# Patient Record
Sex: FEMALE | Race: WHITE | NOT HISPANIC OR LATINO | Employment: OTHER | URBAN - METROPOLITAN AREA
[De-identification: names, ages, dates, MRNs, and addresses within clinical notes are randomized per-mention and may not be internally consistent; named-entity substitution may affect disease eponyms.]

---

## 2018-05-21 ENCOUNTER — TRANSFERRED RECORDS (OUTPATIENT)
Dept: HEALTH INFORMATION MANAGEMENT | Facility: CLINIC | Age: 78
End: 2018-05-21

## 2018-05-30 ENCOUNTER — ANESTHESIA EVENT (OUTPATIENT)
Dept: SURGERY | Facility: CLINIC | Age: 78
DRG: 460 | End: 2018-05-30
Payer: MEDICARE

## 2018-05-30 ENCOUNTER — ANESTHESIA (OUTPATIENT)
Dept: SURGERY | Facility: CLINIC | Age: 78
DRG: 460 | End: 2018-05-30
Payer: MEDICARE

## 2018-05-30 ENCOUNTER — APPOINTMENT (OUTPATIENT)
Dept: GENERAL RADIOLOGY | Facility: CLINIC | Age: 78
DRG: 460 | End: 2018-05-30
Attending: ORTHOPAEDIC SURGERY
Payer: MEDICARE

## 2018-05-30 ENCOUNTER — HOSPITAL ENCOUNTER (INPATIENT)
Facility: CLINIC | Age: 78
LOS: 4 days | Discharge: HOME | DRG: 460 | End: 2018-06-03
Attending: ORTHOPAEDIC SURGERY | Admitting: ORTHOPAEDIC SURGERY
Payer: MEDICARE

## 2018-05-30 DIAGNOSIS — B96.20 E. COLI UTI: ICD-10-CM

## 2018-05-30 DIAGNOSIS — N39.0 E. COLI UTI: ICD-10-CM

## 2018-05-30 DIAGNOSIS — Z98.1 STATUS POST ARTHRODESIS: Primary | ICD-10-CM

## 2018-05-30 PROBLEM — M48.00 SPINAL STENOSIS: Status: ACTIVE | Noted: 2018-05-30

## 2018-05-30 LAB
ABO + RH BLD: NORMAL
ABO + RH BLD: NORMAL
BLD GP AB SCN SERPL QL: NORMAL
BLOOD BANK CMNT PATIENT-IMP: NORMAL
GLUCOSE BLDC GLUCOMTR-MCNC: 134 MG/DL (ref 70–99)
GLUCOSE BLDC GLUCOMTR-MCNC: 134 MG/DL (ref 70–99)
POTASSIUM SERPL-SCNC: 4.3 MMOL/L (ref 3.4–5.3)
SPECIMEN EXP DATE BLD: NORMAL

## 2018-05-30 PROCEDURE — 25000300 ZZH OR RX SURGIFLO HEMOSTATIC MATRIX 10ML 199102S OPNP: Performed by: ORTHOPAEDIC SURGERY

## 2018-05-30 PROCEDURE — 84132 ASSAY OF SERUM POTASSIUM: CPT | Performed by: ANESTHESIOLOGY

## 2018-05-30 PROCEDURE — 86901 BLOOD TYPING SEROLOGIC RH(D): CPT | Performed by: ANESTHESIOLOGY

## 2018-05-30 PROCEDURE — A9270 NON-COVERED ITEM OR SERVICE: HCPCS | Mod: GY | Performed by: PHYSICIAN ASSISTANT

## 2018-05-30 PROCEDURE — 07DR3ZZ EXTRACTION OF ILIAC BONE MARROW, PERCUTANEOUS APPROACH: ICD-10-PCS | Performed by: ORTHOPAEDIC SURGERY

## 2018-05-30 PROCEDURE — 00000146 ZZHCL STATISTIC GLUCOSE BY METER IP

## 2018-05-30 PROCEDURE — 86900 BLOOD TYPING SEROLOGIC ABO: CPT | Performed by: ANESTHESIOLOGY

## 2018-05-30 PROCEDURE — 37000008 ZZH ANESTHESIA TECHNICAL FEE, 1ST 30 MIN: Performed by: ORTHOPAEDIC SURGERY

## 2018-05-30 PROCEDURE — 25000125 ZZHC RX 250: Performed by: PHYSICIAN ASSISTANT

## 2018-05-30 PROCEDURE — 36000071 ZZH SURGERY LEVEL 5 W FLUORO 1ST 30 MIN: Performed by: ORTHOPAEDIC SURGERY

## 2018-05-30 PROCEDURE — 27210794 ZZH OR GENERAL SUPPLY STERILE: Performed by: ORTHOPAEDIC SURGERY

## 2018-05-30 PROCEDURE — 25000128 H RX IP 250 OP 636: Performed by: PHYSICIAN ASSISTANT

## 2018-05-30 PROCEDURE — 12000007 ZZH R&B INTERMEDIATE

## 2018-05-30 PROCEDURE — 27210995 ZZH RX 272: Performed by: ORTHOPAEDIC SURGERY

## 2018-05-30 PROCEDURE — 25000125 ZZHC RX 250: Performed by: ORTHOPAEDIC SURGERY

## 2018-05-30 PROCEDURE — 25000128 H RX IP 250 OP 636: Performed by: NURSE ANESTHETIST, CERTIFIED REGISTERED

## 2018-05-30 PROCEDURE — 25000125 ZZHC RX 250: Performed by: ANESTHESIOLOGY

## 2018-05-30 PROCEDURE — 25000566 ZZH SEVOFLURANE, EA 15 MIN: Performed by: ORTHOPAEDIC SURGERY

## 2018-05-30 PROCEDURE — 25000128 H RX IP 250 OP 636: Performed by: ANESTHESIOLOGY

## 2018-05-30 PROCEDURE — 25000125 ZZHC RX 250: Performed by: NURSE ANESTHETIST, CERTIFIED REGISTERED

## 2018-05-30 PROCEDURE — 0SG0071 FUSION OF LUMBAR VERTEBRAL JOINT WITH AUTOLOGOUS TISSUE SUBSTITUTE, POSTERIOR APPROACH, POSTERIOR COLUMN, OPEN APPROACH: ICD-10-PCS | Performed by: ORTHOPAEDIC SURGERY

## 2018-05-30 PROCEDURE — L0642 LO SAG RI AN/POS PNL PRE OTS: HCPCS

## 2018-05-30 PROCEDURE — 25000132 ZZH RX MED GY IP 250 OP 250 PS 637: Mod: GY | Performed by: PHYSICIAN ASSISTANT

## 2018-05-30 PROCEDURE — 40000277 XR SURGERY CARM FLUORO LESS THAN 5 MIN W STILLS

## 2018-05-30 PROCEDURE — 36000069 ZZH SURGERY LEVEL 5 EA 15 ADDTL MIN: Performed by: ORTHOPAEDIC SURGERY

## 2018-05-30 PROCEDURE — 71000012 ZZH RECOVERY PHASE 1 LEVEL 1 FIRST HR: Performed by: ORTHOPAEDIC SURGERY

## 2018-05-30 PROCEDURE — 37000009 ZZH ANESTHESIA TECHNICAL FEE, EACH ADDTL 15 MIN: Performed by: ORTHOPAEDIC SURGERY

## 2018-05-30 PROCEDURE — 25000128 H RX IP 250 OP 636: Performed by: ORTHOPAEDIC SURGERY

## 2018-05-30 PROCEDURE — C1713 ANCHOR/SCREW BN/BN,TIS/BN: HCPCS | Performed by: ORTHOPAEDIC SURGERY

## 2018-05-30 PROCEDURE — 40000170 ZZH STATISTIC PRE-PROCEDURE ASSESSMENT II: Performed by: ORTHOPAEDIC SURGERY

## 2018-05-30 PROCEDURE — 86850 RBC ANTIBODY SCREEN: CPT | Performed by: ANESTHESIOLOGY

## 2018-05-30 PROCEDURE — 36415 COLL VENOUS BLD VENIPUNCTURE: CPT | Performed by: ANESTHESIOLOGY

## 2018-05-30 PROCEDURE — 25000132 ZZH RX MED GY IP 250 OP 250 PS 637: Mod: GY | Performed by: ORTHOPAEDIC SURGERY

## 2018-05-30 DEVICE — IMP SCR MEDT TSRH 3DX OG THIN 6.5X45MM TI 83565459: Type: IMPLANTABLE DEVICE | Status: FUNCTIONAL

## 2018-05-30 DEVICE — IMP SCR SET MEDT TSRH 3D DBL HEX 8281246: Type: IMPLANTABLE DEVICE | Site: SPINE LUMBAR | Status: FUNCTIONAL

## 2018-05-30 DEVICE — IMP CONNECTOR MEDT TSRH 3D SM 8379120: Type: IMPLANTABLE DEVICE | Site: SPINE LUMBAR | Status: FUNCTIONAL

## 2018-05-30 DEVICE — IMP ROD MEDT TSRH PREBENT 03.5CM 8370035: Type: IMPLANTABLE DEVICE | Site: SPINE LUMBAR | Status: FUNCTIONAL

## 2018-05-30 RX ORDER — BISACODYL 5 MG
10 TABLET, DELAYED RELEASE (ENTERIC COATED) ORAL DAILY PRN
Status: DISCONTINUED | OUTPATIENT
Start: 2018-05-30 | End: 2018-06-03 | Stop reason: HOSPADM

## 2018-05-30 RX ORDER — DIPHENHYDRAMINE HYDROCHLORIDE 50 MG/ML
12.5 INJECTION INTRAMUSCULAR; INTRAVENOUS EVERY 6 HOURS PRN
Status: DISCONTINUED | OUTPATIENT
Start: 2018-05-30 | End: 2018-06-03 | Stop reason: HOSPADM

## 2018-05-30 RX ORDER — ONDANSETRON 4 MG/1
4 TABLET, ORALLY DISINTEGRATING ORAL EVERY 6 HOURS PRN
Status: DISCONTINUED | OUTPATIENT
Start: 2018-05-30 | End: 2018-06-03 | Stop reason: HOSPADM

## 2018-05-30 RX ORDER — FENTANYL CITRATE 50 UG/ML
25-50 INJECTION, SOLUTION INTRAMUSCULAR; INTRAVENOUS
Status: DISCONTINUED | OUTPATIENT
Start: 2018-05-30 | End: 2018-05-30 | Stop reason: HOSPADM

## 2018-05-30 RX ORDER — ONDANSETRON 2 MG/ML
INJECTION INTRAMUSCULAR; INTRAVENOUS PRN
Status: DISCONTINUED | OUTPATIENT
Start: 2018-05-30 | End: 2018-05-30

## 2018-05-30 RX ORDER — VECURONIUM BROMIDE 1 MG/ML
INJECTION, POWDER, LYOPHILIZED, FOR SOLUTION INTRAVENOUS PRN
Status: DISCONTINUED | OUTPATIENT
Start: 2018-05-30 | End: 2018-05-30

## 2018-05-30 RX ORDER — LOSARTAN POTASSIUM 25 MG/1
25 TABLET ORAL 2 TIMES DAILY
Status: DISCONTINUED | OUTPATIENT
Start: 2018-05-30 | End: 2018-06-03 | Stop reason: HOSPADM

## 2018-05-30 RX ORDER — CEFAZOLIN SODIUM 2 G/100ML
2 INJECTION, SOLUTION INTRAVENOUS
Status: DISCONTINUED | OUTPATIENT
Start: 2018-05-30 | End: 2018-05-30 | Stop reason: HOSPADM

## 2018-05-30 RX ORDER — LIDOCAINE HYDROCHLORIDE 20 MG/ML
INJECTION, SOLUTION INFILTRATION; PERINEURAL PRN
Status: DISCONTINUED | OUTPATIENT
Start: 2018-05-30 | End: 2018-05-30

## 2018-05-30 RX ORDER — HYDROMORPHONE HYDROCHLORIDE 1 MG/ML
.3-.5 INJECTION, SOLUTION INTRAMUSCULAR; INTRAVENOUS; SUBCUTANEOUS
Status: DISCONTINUED | OUTPATIENT
Start: 2018-05-30 | End: 2018-06-03 | Stop reason: HOSPADM

## 2018-05-30 RX ORDER — FENTANYL CITRATE 50 UG/ML
INJECTION, SOLUTION INTRAMUSCULAR; INTRAVENOUS PRN
Status: DISCONTINUED | OUTPATIENT
Start: 2018-05-30 | End: 2018-05-30

## 2018-05-30 RX ORDER — DIPHENHYDRAMINE HCL 12.5MG/5ML
12.5 LIQUID (ML) ORAL EVERY 6 HOURS PRN
Status: DISCONTINUED | OUTPATIENT
Start: 2018-05-30 | End: 2018-06-03 | Stop reason: HOSPADM

## 2018-05-30 RX ORDER — SPIRONOLACTONE 25 MG
25 TABLET ORAL DAILY
Status: DISCONTINUED | OUTPATIENT
Start: 2018-05-30 | End: 2018-06-03 | Stop reason: HOSPADM

## 2018-05-30 RX ORDER — ACETAMINOPHEN 325 MG/1
975 TABLET ORAL EVERY 8 HOURS
Status: COMPLETED | OUTPATIENT
Start: 2018-05-30 | End: 2018-06-02

## 2018-05-30 RX ORDER — POLYETHYLENE GLYCOL 3350 17 G/17G
17 POWDER, FOR SOLUTION ORAL DAILY PRN
Status: DISCONTINUED | OUTPATIENT
Start: 2018-05-30 | End: 2018-06-03 | Stop reason: HOSPADM

## 2018-05-30 RX ORDER — AMOXICILLIN 250 MG
1 CAPSULE ORAL 2 TIMES DAILY
Status: DISCONTINUED | OUTPATIENT
Start: 2018-05-30 | End: 2018-06-03 | Stop reason: HOSPADM

## 2018-05-30 RX ORDER — CLINDAMYCIN PHOSPHATE 900 MG/50ML
900 INJECTION, SOLUTION INTRAVENOUS EVERY 8 HOURS
Status: COMPLETED | OUTPATIENT
Start: 2018-05-30 | End: 2018-05-31

## 2018-05-30 RX ORDER — GLYCOPYRROLATE 0.2 MG/ML
INJECTION, SOLUTION INTRAMUSCULAR; INTRAVENOUS PRN
Status: DISCONTINUED | OUTPATIENT
Start: 2018-05-30 | End: 2018-05-30

## 2018-05-30 RX ORDER — CLINDAMYCIN PHOSPHATE 900 MG/50ML
INJECTION, SOLUTION INTRAVENOUS PRN
Status: DISCONTINUED | OUTPATIENT
Start: 2018-05-30 | End: 2018-05-30

## 2018-05-30 RX ORDER — AMOXICILLIN 250 MG
2 CAPSULE ORAL 2 TIMES DAILY
Status: DISCONTINUED | OUTPATIENT
Start: 2018-05-30 | End: 2018-06-03 | Stop reason: HOSPADM

## 2018-05-30 RX ORDER — NEOSTIGMINE METHYLSULFATE 1 MG/ML
VIAL (ML) INJECTION PRN
Status: DISCONTINUED | OUTPATIENT
Start: 2018-05-30 | End: 2018-05-30

## 2018-05-30 RX ORDER — ONDANSETRON 2 MG/ML
4 INJECTION INTRAMUSCULAR; INTRAVENOUS EVERY 30 MIN PRN
Status: DISCONTINUED | OUTPATIENT
Start: 2018-05-30 | End: 2018-05-30 | Stop reason: HOSPADM

## 2018-05-30 RX ORDER — SODIUM CHLORIDE, SODIUM LACTATE, POTASSIUM CHLORIDE, CALCIUM CHLORIDE 600; 310; 30; 20 MG/100ML; MG/100ML; MG/100ML; MG/100ML
INJECTION, SOLUTION INTRAVENOUS CONTINUOUS
Status: DISCONTINUED | OUTPATIENT
Start: 2018-05-30 | End: 2018-05-30 | Stop reason: HOSPADM

## 2018-05-30 RX ORDER — HYDROMORPHONE HYDROCHLORIDE 1 MG/ML
.3-.5 INJECTION, SOLUTION INTRAMUSCULAR; INTRAVENOUS; SUBCUTANEOUS EVERY 5 MIN PRN
Status: DISCONTINUED | OUTPATIENT
Start: 2018-05-30 | End: 2018-05-30 | Stop reason: HOSPADM

## 2018-05-30 RX ORDER — NALOXONE HYDROCHLORIDE 0.4 MG/ML
.1-.4 INJECTION, SOLUTION INTRAMUSCULAR; INTRAVENOUS; SUBCUTANEOUS
Status: DISCONTINUED | OUTPATIENT
Start: 2018-05-30 | End: 2018-05-30

## 2018-05-30 RX ORDER — SODIUM CHLORIDE 9 MG/ML
INJECTION, SOLUTION INTRAVENOUS CONTINUOUS
Status: DISCONTINUED | OUTPATIENT
Start: 2018-05-30 | End: 2018-05-31

## 2018-05-30 RX ORDER — CEFAZOLIN SODIUM 1 G/3ML
1 INJECTION, POWDER, FOR SOLUTION INTRAMUSCULAR; INTRAVENOUS SEE ADMIN INSTRUCTIONS
Status: DISCONTINUED | OUTPATIENT
Start: 2018-05-30 | End: 2018-05-30 | Stop reason: HOSPADM

## 2018-05-30 RX ORDER — METOPROLOL SUCCINATE 25 MG/1
25 TABLET, EXTENDED RELEASE ORAL DAILY
Status: DISCONTINUED | OUTPATIENT
Start: 2018-05-31 | End: 2018-06-03 | Stop reason: HOSPADM

## 2018-05-30 RX ORDER — MAGNESIUM CARB/ALUMINUM HYDROX 105-160MG
148 TABLET,CHEWABLE ORAL
Status: DISCONTINUED | OUTPATIENT
Start: 2018-05-30 | End: 2018-06-03 | Stop reason: HOSPADM

## 2018-05-30 RX ORDER — OXYCODONE HYDROCHLORIDE 5 MG/1
5-10 TABLET ORAL EVERY 4 HOURS PRN
Status: DISCONTINUED | OUTPATIENT
Start: 2018-05-30 | End: 2018-06-03 | Stop reason: HOSPADM

## 2018-05-30 RX ORDER — LIDOCAINE 40 MG/G
CREAM TOPICAL
Status: DISCONTINUED | OUTPATIENT
Start: 2018-05-30 | End: 2018-06-03 | Stop reason: HOSPADM

## 2018-05-30 RX ORDER — ONDANSETRON 2 MG/ML
4 INJECTION INTRAMUSCULAR; INTRAVENOUS EVERY 6 HOURS PRN
Status: DISCONTINUED | OUTPATIENT
Start: 2018-05-30 | End: 2018-06-03 | Stop reason: HOSPADM

## 2018-05-30 RX ORDER — BISACODYL 5 MG
5 TABLET, DELAYED RELEASE (ENTERIC COATED) ORAL DAILY PRN
Status: DISCONTINUED | OUTPATIENT
Start: 2018-05-30 | End: 2018-06-03 | Stop reason: HOSPADM

## 2018-05-30 RX ORDER — PROPOFOL 10 MG/ML
INJECTION, EMULSION INTRAVENOUS PRN
Status: DISCONTINUED | OUTPATIENT
Start: 2018-05-30 | End: 2018-05-30

## 2018-05-30 RX ORDER — ONDANSETRON 4 MG/1
4 TABLET, ORALLY DISINTEGRATING ORAL EVERY 30 MIN PRN
Status: DISCONTINUED | OUTPATIENT
Start: 2018-05-30 | End: 2018-05-30 | Stop reason: HOSPADM

## 2018-05-30 RX ORDER — NALOXONE HYDROCHLORIDE 0.4 MG/ML
.1-.4 INJECTION, SOLUTION INTRAMUSCULAR; INTRAVENOUS; SUBCUTANEOUS
Status: DISCONTINUED | OUTPATIENT
Start: 2018-05-30 | End: 2018-06-03 | Stop reason: HOSPADM

## 2018-05-30 RX ORDER — BISACODYL 5 MG
15 TABLET, DELAYED RELEASE (ENTERIC COATED) ORAL DAILY PRN
Status: DISCONTINUED | OUTPATIENT
Start: 2018-05-30 | End: 2018-06-03 | Stop reason: HOSPADM

## 2018-05-30 RX ORDER — DEXAMETHASONE SODIUM PHOSPHATE 4 MG/ML
INJECTION, SOLUTION INTRA-ARTICULAR; INTRALESIONAL; INTRAMUSCULAR; INTRAVENOUS; SOFT TISSUE PRN
Status: DISCONTINUED | OUTPATIENT
Start: 2018-05-30 | End: 2018-05-30

## 2018-05-30 RX ORDER — ACETAMINOPHEN 325 MG/1
650 TABLET ORAL EVERY 4 HOURS PRN
Status: DISCONTINUED | OUTPATIENT
Start: 2018-06-02 | End: 2018-06-03 | Stop reason: HOSPADM

## 2018-05-30 RX ADMIN — LIDOCAINE HYDROCHLORIDE 80 MG: 20 INJECTION, SOLUTION INFILTRATION; PERINEURAL at 09:27

## 2018-05-30 RX ADMIN — ACETAMINOPHEN 975 MG: 325 TABLET, FILM COATED ORAL at 23:15

## 2018-05-30 RX ADMIN — DEXMEDETOMIDINE HYDROCHLORIDE 8 MCG: 100 INJECTION, SOLUTION INTRAVENOUS at 09:23

## 2018-05-30 RX ADMIN — SODIUM CHLORIDE: 9 INJECTION, SOLUTION INTRAVENOUS at 16:28

## 2018-05-30 RX ADMIN — DEXAMETHASONE SODIUM PHOSPHATE 4 MG: 4 INJECTION, SOLUTION INTRA-ARTICULAR; INTRALESIONAL; INTRAMUSCULAR; INTRAVENOUS; SOFT TISSUE at 09:52

## 2018-05-30 RX ADMIN — LOSARTAN POTASSIUM 25 MG: 25 TABLET, FILM COATED ORAL at 20:53

## 2018-05-30 RX ADMIN — ROCURONIUM BROMIDE 40 MG: 10 INJECTION INTRAVENOUS at 09:28

## 2018-05-30 RX ADMIN — GLYCOPYRROLATE 0.6 MG: 0.2 INJECTION, SOLUTION INTRAMUSCULAR; INTRAVENOUS at 13:44

## 2018-05-30 RX ADMIN — VECURONIUM BROMIDE 2 MG: 1 INJECTION, POWDER, LYOPHILIZED, FOR SOLUTION INTRAVENOUS at 10:58

## 2018-05-30 RX ADMIN — ACETAMINOPHEN 975 MG: 325 TABLET, FILM COATED ORAL at 17:16

## 2018-05-30 RX ADMIN — HYDROMORPHONE HYDROCHLORIDE 0.5 MG: 1 INJECTION, SOLUTION INTRAMUSCULAR; INTRAVENOUS; SUBCUTANEOUS at 10:48

## 2018-05-30 RX ADMIN — PHENYLEPHRINE HYDROCHLORIDE 100 MCG: 10 INJECTION, SOLUTION INTRAMUSCULAR; INTRAVENOUS; SUBCUTANEOUS at 09:52

## 2018-05-30 RX ADMIN — HYDROMORPHONE HYDROCHLORIDE: 10 INJECTION, SOLUTION INTRAMUSCULAR; INTRAVENOUS; SUBCUTANEOUS at 14:31

## 2018-05-30 RX ADMIN — HYDROMORPHONE HYDROCHLORIDE 0.5 MG: 1 INJECTION, SOLUTION INTRAMUSCULAR; INTRAVENOUS; SUBCUTANEOUS at 11:57

## 2018-05-30 RX ADMIN — LIDOCAINE HYDROCHLORIDE 5 ML: 10 INJECTION, SOLUTION EPIDURAL; INFILTRATION; INTRACAUDAL; PERINEURAL at 08:47

## 2018-05-30 RX ADMIN — SENNOSIDES AND DOCUSATE SODIUM 1 TABLET: 8.6; 5 TABLET ORAL at 20:54

## 2018-05-30 RX ADMIN — CLINDAMYCIN PHOSPHATE 900 MG: 18 INJECTION, SOLUTION INTRAVENOUS at 18:31

## 2018-05-30 RX ADMIN — Medication 25 MG: at 17:16

## 2018-05-30 RX ADMIN — FENTANYL CITRATE 50 MCG: 50 INJECTION, SOLUTION INTRAMUSCULAR; INTRAVENOUS at 11:29

## 2018-05-30 RX ADMIN — FENTANYL CITRATE 50 MCG: 50 INJECTION, SOLUTION INTRAMUSCULAR; INTRAVENOUS at 11:31

## 2018-05-30 RX ADMIN — PROPOFOL 20 MG: 10 INJECTION, EMULSION INTRAVENOUS at 11:26

## 2018-05-30 RX ADMIN — VECURONIUM BROMIDE 2 MG: 1 INJECTION, POWDER, LYOPHILIZED, FOR SOLUTION INTRAVENOUS at 10:05

## 2018-05-30 RX ADMIN — PROPOFOL 20 MG: 10 INJECTION, EMULSION INTRAVENOUS at 09:45

## 2018-05-30 RX ADMIN — Medication 1 EACH: at 09:19

## 2018-05-30 RX ADMIN — PROPOFOL 150 MG: 10 INJECTION, EMULSION INTRAVENOUS at 09:27

## 2018-05-30 RX ADMIN — ONDANSETRON 4 MG: 2 INJECTION INTRAMUSCULAR; INTRAVENOUS at 13:34

## 2018-05-30 RX ADMIN — SODIUM CHLORIDE, POTASSIUM CHLORIDE, SODIUM LACTATE AND CALCIUM CHLORIDE: 600; 310; 30; 20 INJECTION, SOLUTION INTRAVENOUS at 10:38

## 2018-05-30 RX ADMIN — PHENYLEPHRINE HYDROCHLORIDE 100 MCG: 10 INJECTION, SOLUTION INTRAMUSCULAR; INTRAVENOUS; SUBCUTANEOUS at 10:41

## 2018-05-30 RX ADMIN — PROPOFOL 30 MG: 10 INJECTION, EMULSION INTRAVENOUS at 11:28

## 2018-05-30 RX ADMIN — Medication 1 LOZENGE: at 17:16

## 2018-05-30 RX ADMIN — FENTANYL CITRATE 50 MCG: 50 INJECTION, SOLUTION INTRAMUSCULAR; INTRAVENOUS at 09:27

## 2018-05-30 RX ADMIN — DEXMEDETOMIDINE HYDROCHLORIDE 8 MCG: 100 INJECTION, SOLUTION INTRAVENOUS at 09:19

## 2018-05-30 RX ADMIN — SODIUM CHLORIDE, POTASSIUM CHLORIDE, SODIUM LACTATE AND CALCIUM CHLORIDE: 600; 310; 30; 20 INJECTION, SOLUTION INTRAVENOUS at 08:46

## 2018-05-30 RX ADMIN — VECURONIUM BROMIDE 2 MG: 1 INJECTION, POWDER, LYOPHILIZED, FOR SOLUTION INTRAVENOUS at 11:25

## 2018-05-30 RX ADMIN — FENTANYL CITRATE 50 MCG: 50 INJECTION, SOLUTION INTRAMUSCULAR; INTRAVENOUS at 09:23

## 2018-05-30 RX ADMIN — CLINDAMYCIN PHOSPHATE 900 MG: 18 INJECTION, SOLUTION INTRAVENOUS at 09:41

## 2018-05-30 RX ADMIN — DEXMEDETOMIDINE HYDROCHLORIDE 0.3 MCG/KG/HR: 100 INJECTION, SOLUTION INTRAVENOUS at 09:40

## 2018-05-30 RX ADMIN — FENTANYL CITRATE 50 MCG: 50 INJECTION, SOLUTION INTRAMUSCULAR; INTRAVENOUS at 09:59

## 2018-05-30 RX ADMIN — VECURONIUM BROMIDE 2 MG: 1 INJECTION, POWDER, LYOPHILIZED, FOR SOLUTION INTRAVENOUS at 12:56

## 2018-05-30 RX ADMIN — SODIUM CHLORIDE, POTASSIUM CHLORIDE, SODIUM LACTATE AND CALCIUM CHLORIDE: 600; 310; 30; 20 INJECTION, SOLUTION INTRAVENOUS at 13:49

## 2018-05-30 RX ADMIN — NEOSTIGMINE METHYLSULFATE 4 MG: 1 INJECTION, SOLUTION INTRAVENOUS at 13:44

## 2018-05-30 ASSESSMENT — ACTIVITIES OF DAILY LIVING (ADL)
ADLS_ACUITY_SCORE: 9
ADLS_ACUITY_SCORE: 9

## 2018-05-30 ASSESSMENT — LIFESTYLE VARIABLES: TOBACCO_USE: 0

## 2018-05-30 NOTE — PROGRESS NOTES
Admission medication history interview status for the 5/30/2018  admission is complete. See EPIC admission navigator for prior to admission medications     Medication history source reliability:Good    Medication history interview source(s):Patient    Medication history resources (including written lists, pill bottles, clinic record):None    Primary pharmacy.[facility]    Additional medication history information not noted on PTA med list :None    Time spent in this activity: 45 minutes    Prior to Admission medications    Medication Sig Last Dose Taking? Auth Provider   Alpha-Lipoic Acid (LIPOIC ACID PO) Take 300 mg by mouth 2 times daily 5/24/2018 Yes Reported, Patient   Ascorbic Acid (VITAMIN C PO) Take 1,000 mg by mouth 3 times daily  5/24/2018 Yes Reported, Patient   B Complex-C (SUPER B COMPLEX PO) Take 1 tablet by mouth daily  5/24/2018 Yes Reported, Patient   Barberry-Oreg Grape-Goldenseal (BERBERINE COMPLEX PO) Take 500 mg by mouth 2 times daily  5/24/2018 Yes Reported, Patient   Cetirizine HCl (KLS ALLER-ISIDRA PO) Take 10 mg by mouth daily 5/29/2018 at am Yes Reported, Patient   Coenzyme Q10 (COQ-10 PO) Take 100 mg by mouth daily  5/24/2018 Yes Reported, Patient   FOLIC ACID PO Take 10 drops by mouth daily Super Liquid Folate 5/24/2018 Yes Reported, Patient   GLUCOSAMINE-CHONDROITIN PO Take 1 tablet by mouth daily  5/24/2018 Yes Reported, Patient   KRILL OIL PO Take 500 mg by mouth daily more than a week Yes Reported, Patient   LOSARTAN POTASSIUM PO Take 25 mg by mouth 2 times daily 5/30/2018 at 0500 Yes Reported, Patient   MAGNESIUM PO Take 250 mg by mouth daily  5/24/2018 Yes Reported, Patient   MELATONIN PO Take 10 tablets by mouth daily  5/29/2018 at pm Yes Reported, Patient   METOPROLOL SUCCINATE ER PO Take 25 mg by mouth daily (Takes 0.5 x 50mg tablet = 25mg dose) 5/30/2018 at 0500 Yes Reported, Patient   minoxidil (ROGAINE) 2 % external solution Apply topically 2 times daily 5/24/2018 Yes Reported,  Patient   Multiple Vitamins-Minerals (HAIR SKIN NAILS PO) Take 2 tablets by mouth daily 5/24/2018 Yes Reported, Patient   OMEPRAZOLE PO Take 20 mg by mouth daily 5/29/2018 at am Yes Reported, Patient   Probiotic Product (PROBIOTIC PO) Take 2 tablets by mouth daily  5/24/2018 Yes Reported, Patient   SPIRONOLACTONE PO Take 25 mg by mouth daily 5/24/2018 Yes Reported, Patient   TRAMADOL HCL PO Take 50 mg by mouth 3 times daily as needed  more than a week at prn Yes Reported, Patient   TURMERIC PO Take 500 mg by mouth 2 times daily  5/24/2018 Yes Reported, Patient   VITAMIN D-VITAMIN K PO Take 15 drops by mouth daily 5/24/2018 Yes Reported, Patient   VITAMIN E PO Take 400 Units by mouth daily  5/24/2018 Yes Reported, Patient

## 2018-05-30 NOTE — IP AVS SNAPSHOT
MRN:[id number]                      After Visit Summary   5/30/2018    Lisa Johnston    MRN: [id number]           Thank you!     Thank you for choosing [facility] for your care. Our goal is always to provide you with excellent care. Hearing back from our patients is one way we can continue to improve our services. Please take a few minutes to complete the written survey that you may receive in the mail after you visit with us. Thank you!        Patient Information     Date Of Birth          1940        Designated Caregiver       Most Recent Value    Caregiver    Will someone help with your care after discharge? yes    Name of designated caregiver bon    Phone number of caregiver     Caregiver address [place]      About your hospital stay     You were admitted on:  May 30, 2018 You last received care in the:  [facility] [address] [facility]    You were discharged on:  Yasmin 3, 2018        Reason for your hospital stay       UTI  Degenerative spondylolisthesis L4-5 with severe spinal stenosis and bilateral leg radiculopathy- s/p L4-5 decompression bilateral, posterior spinal fusion with bone graft.                  Who to Call     For medical emergencies, please call 911.  For non-urgent questions about your medical care, please call your primary care provider or clinic, [phone]  For questions related to your surgery, please call your surgery clinic        Attending Provider     Provider Specialty    Price Prajapati MD Orthopedics       Primary Care Provider Office Phone # Fax #    Wilfred Medina [phone]      After Care Instructions     Diet       Follow this diet upon discharge: Orders Placed This Encounter      Regular Diet Adult                  Follow-up Appointments     Follow-up and recommended labs and tests        Follow up with primary care provider, Wilfred Medina, within 7 days for hospital follow- up.  No follow up labs or test are  needed.                  Further instructions from your care team                Care after a Spinal Fusion - Dr. Price Prajapati      The following information will help you through your recovery at home.    Pain    It is normal for you to experience pain after your surgery, most patients feel their pre-op pain has improved and they are left with a fair amount of surgical pain. This pain will improve a great deal over the 5-7 days you are in the hospital.      It is normal to have some ongoing pain when you get home from the hospital.  The pain should slowly improve over the next several weeks to months.     You should call Dr. Prajapati s office if you have a sudden onset of pain that does not improve over 24 hours.     Activity    You may increase your activity as tolerated, walking is the best form of exercise after spine surgery.      Avoid bending, lifting and twisting at the waist (BLT).  Avoid activities such as vacuuming, raking and shoveling.     You should wear your lumbar support when you are up out of bed. You should wear the brace for 6 weeks after your surgery.    Try to limit your lifting to 10lbs until you see Dr. Prajapati at your post-op appointment.    You should anticipate being out of work approximately 4-6 weeks after your surgery, some patients are able to return sooner and others need more time.  Dr. Prajapati and his staff will help you determine a return to work plan.      You may resume sexual activity 4-6 weeks after surgery.  Stop if you have pain.    Driving    You may drive if you feel strong enough and are not taking any narcotic pain medications.    Incision Site    When you leave the hospital keep your back incision covered for 10 days from your surgery date with daily dressing changes. If you have a stomach incision it can be left uncovered.    You may shower without covering your incision once you are home from the hospital, allow water and soap to run over your incision but do not scrub the  area or soak in a tub.      Your incision is covered with steri-strips (narrow white tapes); they will get loose and fall off in 7-10 days.  If they do not fall off after 10 days you may remove them or Dr. Alo villalobos staff will remove for you at your post-op visit.    Most patients have dissolvable stitches which do not need to be removed.  In some cases staples or nylon stitches are used and they need to be removed, 10-14 days after surgery. If you have staples or nylon sutures please call for a removal appointment with Dr. Alo villalobos nurse.    Pain Management     Take your prescribed pain medication as needed and directed.  Use Tylenol for your discomfort when you no longer need the narcotic pain medication.      DO NOT take Ibuprofen, Aleve, Motrin, Advil or any other anti-inflammatory medication.  They may affect the bone growth of your fusion.    If you need a refill on your pain medication call [phone], please allow 24 hour for your prescription to be refilled, Dr. Alo villalobos office does not refill pain medications on Friday.    Follow-up Visits     You should have a post-op appointment that was scheduled for you at the same time your surgery was scheduled. If you do not please call Dr. Alo villalobos office as soon as you get home.  Your appointment will be approximately one month after your surgery.    Write down any questions you have about your surgery, recovery, return to work and other topics you wished to be covered at your post-op visit.  This way, we will be able to address all of your questions at your next visit.    Call your doctor if you have any questions or concerns.    When to Call your Doctor    If you have any redness, warmth or swelling at the incision site.    If your incision opens up.    If you have increasing drainage from your incision.    If you have a temperature of more than 100.5 degrees Fahrenheit.    [facility] [address] [address]  Phone: [phone]  Fax: [phone]  Web  "site: [email or web address]        PUT ICE ON BOTH LOWER ARM IV SITES EVERY 4-6 HOURS, MONITOR FOR INCREASED REDNESS, SWELLING, OR PAIN.  CALL PRIMARY MD IF ANY ISSUES ARISE           Pending Results     Date and Time Order Name Status Description    2018 1100 Urine Culture Aerobic Bacterial Preliminary             Statement of Approval     Ordered          18 0715  I have reviewed and agree with all the recommendations and orders detailed in this document.  EFFECTIVE NOW     Approved and electronically signed by:  Price Prajapati MD             Admission Information     Date & Time Provider Department Dept. Phone    2018 Price Prajapati MD [place] [address] [facility] [phone]      Your Vitals Were     Blood Pressure Temperature Respirations Height Weight Pulse Oximetry    154/71 (BP Location: Right arm) 98.3  F (36.8  C) (Oral) 16 1.6 m (5' 3\") 90.3 kg (199 lb) 93%    BMI (Body Mass Index)                   35.25 kg/m2           MyChart Information     [facility] lets you send messages to your doctor, view your test results, renew your prescriptions, schedule appointments and more. To sign up, go to [email or web address] . Click on \"Log in\" on the left side of the screen, which will take you to the Welcome page. Then click on \"Sign up Now\" on the right side of the page.     You will be asked to enter the access code listed below, as well as some personal information. Please follow the directions to create your username and password.     Your access code is: [email or web address]  Expires: 2018  9:38 AM     Your access code will  in 90 days. If you need help or a new code, please call your [facility] clinic or [phone].        Care EveryWhere ID     This is your Care EveryWhere ID. This could be used by other organizations to access your [facility] medical records  [id number]        Equal Access to Services     LILLY SORIA AH: Opal Saldivar, jose de jesus mccann, roberto " chaz burgoslakshmi hannahaapromise ah. [facility] [phone].    ATENCIÓN: Si christine trinidad, tiene a dennis disposición servicios gratuitos de asistencia lingüística. Mel al [phone].    We comply with applicable federal civil rights laws and [place] laws. We do not discriminate on the basis of race, color, national origin, age, disability, sex, sexual orientation, or gender identity.               Review of your medicines      START taking        Dose / Directions    ciprofloxacin 500 MG tablet   Commonly known as:  CIPRO   Indication:  Urinary Tract Infection   Used for:  E. coli UTI        Dose:  500 mg   Take 1 tablet (500 mg) by mouth 2 times daily for 6 days   Quantity:  11 tablet   Refills:  0         CONTINUE these medicines which may have CHANGED, or have new prescriptions. If we are uncertain of the size of tablets/capsules you have at home, strength may be listed as something that might have changed.        Dose / Directions    * TRAMADOL HCL PO   This may have changed:  Another medication with the same name was added. Make sure you understand how and when to take each.   Notes to Patient:  Take only every 6 hours as needed for pain, not three times in addition to every 6 hours for pain        Dose:  50 mg   Take 50 mg by mouth 3 times daily as needed   Refills:  0       * traMADol 50 MG tablet   Commonly known as:  ULTRAM   This may have changed:  You were already taking a medication with the same name, and this prescription was added. Make sure you understand how and when to take each.   Used for:  Status post arthrodesis   Notes to Patient:  You had Oxycodone 10mg at 10:45, do not take anymore pain medication until at least 3:00 PM        Dose:  50 mg   Take 1 tablet (50 mg) by mouth every 6 hours as needed for moderate pain   Quantity:  20 tablet   Refills:  0       * Notice:  This list has 2 medication(s) that are the same as other medications prescribed for you. Read the  directions carefully, and ask your doctor or other care provider to review them with you.      CONTINUE these medicines which have NOT CHANGED        Dose / Directions    BERBERINE COMPLEX PO        Dose:  500 mg   Take 500 mg by mouth 2 times daily   Refills:  0       COQ-10 PO        Dose:  100 mg   Take 100 mg by mouth daily   Refills:  0       FOLIC ACID PO        Dose:  10 drop   Take 10 drops by mouth daily Super Liquid Folate   Refills:  0       GLUCOSAMINE-CHONDROITIN PO        Dose:  1 tablet   Take 1 tablet by mouth daily   Refills:  0       HAIR SKIN NAILS PO        Dose:  2 tablet   Take 2 tablets by mouth daily   Refills:  0       KLS ALLER-ISIDRA PO        Dose:  10 mg   Take 10 mg by mouth daily   Refills:  0       KRILL OIL PO        Dose:  500 mg   Take 500 mg by mouth daily   Refills:  0       LIPOIC ACID PO        Dose:  300 mg   Take 300 mg by mouth 2 times daily   Refills:  0       LOSARTAN POTASSIUM PO        Dose:  25 mg   Take 25 mg by mouth 2 times daily   Refills:  0       MAGNESIUM PO        Dose:  250 mg   Take 250 mg by mouth daily   Refills:  0       MELATONIN PO        Dose:  10 tablet   Take 10 tablets by mouth daily   Refills:  0       METOPROLOL SUCCINATE ER PO        Dose:  25 mg   Take 25 mg by mouth daily (Takes 0.5 x 50mg tablet = 25mg dose)   Refills:  0       minoxidil 2 % external solution   Commonly known as:  ROGAINE        Apply topically 2 times daily   Refills:  0       OMEPRAZOLE PO        Dose:  20 mg   Take 20 mg by mouth daily   Refills:  0       PROBIOTIC PO        Dose:  2 tablet   Take 2 tablets by mouth daily   Refills:  0       SPIRONOLACTONE PO        Dose:  25 mg   Take 25 mg by mouth daily   Refills:  0       SUPER B COMPLEX PO        Dose:  1 tablet   Take 1 tablet by mouth daily   Refills:  0       TURMERIC PO        Dose:  500 mg   Take 500 mg by mouth 2 times daily   Refills:  0       VITAMIN C PO        Dose:  1000 mg   Take 1,000 mg by mouth 3 times daily    Refills:  0       VITAMIN D-VITAMIN K PO        Dose:  15 drop   Take 15 drops by mouth daily   Refills:  0       VITAMIN E PO        Dose:  400 Units   Take 400 Units by mouth daily   Refills:  0            Where to get your medicines      These medications were sent to [id number] - [place], [place] - [address]  [address] [address]     Phone:  [phone]     ciprofloxacin 500 MG tablet         Some of these will need a paper prescription and others can be bought over the counter. Ask your nurse if you have questions.     Bring a paper prescription for each of these medications     traMADol 50 MG tablet                Protect others around you: Learn how to safely use, store and throw away your medicines at www.disposemymeds.org.        ANTIBIOTIC INSTRUCTION     You've Been Prescribed an Antibiotic - Now What?  Your healthcare team thinks that you or your loved one might have an infection. Some infections can be treated with antibiotics, which are powerful, life-saving drugs. Like all medications, antibiotics have side effects and should only be used when necessary. There are some important things you should know about your antibiotic treatment.      Your healthcare team may run tests before you start taking an antibiotic.    Your team may take samples (e.g., from your blood, urine or other areas) to run tests to look for bacteria. These test can be important to determine if you need an antibiotic at all and, if you do, which antibiotic will work best.      Within a few days, your healthcare team might change or even stop your antibiotic.    Your team may start you on an antibiotic while they are working to find out what is making you sick.    Your team might change your antibiotic because test results show that a different antibiotic would be better to treat your infection.    In some cases, once your team has more information, they learn that you do not need an  antibiotic at all. They may find out that you don't have an infection, or that the antibiotic you're taking won't work against your infection. For example, an infection caused by a virus can't be treated with antibiotics. Staying on an antibiotic when you don't need it is more likely to be harmful than helpful.      You may experience side effects from your antibiotic.    Like all medications, antibiotics have side effects. Some of these can be serious.    Let you healthcare team know if you have any known allergies when you are admitted to the hospital.    One significant side effect of nearly all antibiotics is the risk of severe and sometimes deadly diarrhea caused by Clostridium difficile (C. Difficile). This occurs when a person takes antibiotics because some good germs are destroyed. Antibiotic use allows C. diificile to take over, putting patients at high risk for this serious infection.    As a patient or caregiver, it is important to understand your or your loved one's antibiotic treatment. It is especially important for caregivers to speak up when patients can't speak for themselves. Here are some important questions to ask your healthcare team.    What infection is this antibiotic treating and how do you know I have that infection?    What side effects might occur from this antibiotic?    How long will I need to take this antibiotic?    Is it safe to take this antibiotic with other medications or supplements (e.g., vitamins) that I am taking?     Are there any special directions I need to know about taking this antibiotic? For example, should I take it with food?    How will I be monitored to know whether my infection is responding to the antibiotic?    What tests may help to make sure the right antibiotic is prescribed for me?      Information provided by:  www.cdc.gov/getsmart  U.S. Department of Health and Human Services  Centers for disease Control and Prevention  National Center for Emerging and  Zoonotic Infectious Diseases  Division of Healthcare Quality Promotion        Information about OPIOIDS     PRESCRIPTION OPIOIDS: WHAT YOU NEED TO KNOW   You have a prescription for an opioid (narcotic) pain medicine. Opioids can cause addiction. If you have a history of chemical dependency of any type, you are at a higher risk of becoming addicted to opioids. Only take this medicine after all other options have been tried. Take it for as short a time and as few doses as possible.     Do not:    Drive. If you drive while taking these medicines, you could be arrested for driving under the influence (DUI).    Operate heavy machinery    Do any other dangerous activities while taking these medicines.     Drink any alcohol while taking these medicines.      Take with any other medicines that contain acetaminophen. Read all labels carefully. Look for the word  acetaminophen  or  Tylenol.  Ask your pharmacist if you have questions or are unsure.    Store your pills in a secure place, locked if possible. We will not replace any lost or stolen medicine. If you don t finish your medicine, please throw away (dispose) as directed by your pharmacist. The [place] Pollution Control Agency has more information about safe disposal: https://www.pca.[facility].[place].us/living-green/managing-unwanted-medications    All opioids tend to cause constipation. Drink plenty of water and eat foods that have a lot of fiber, such as fruits, vegetables, prune juice, apple juice and high-fiber cereal. Take a laxative (Miralax, milk of magnesia, Colace, Senna) if you don t move your bowels at least every other day.              Medication List: This is a list of all your medications and when to take them. Check marks below indicate your daily home schedule. Keep this list as a reference.      Medications           Morning Afternoon Evening Bedtime As Needed    BERBERINE COMPLEX PO   Take 500 mg by mouth 2 times daily   Next Dose Due:  6/4/18 am                                       ciprofloxacin 500 MG tablet   Commonly known as:  CIPRO   Take 1 tablet (500 mg) by mouth 2 times daily for 6 days   Last time this was given:  500 mg on 6/3/2018 10:46 AM   Next Dose Due:  6/3/18 8:00 PM            8:00 AM           8:00 PM               COQ-10 PO   Take 100 mg by mouth daily   Next Dose Due:  6/4/18 AM                                   FOLIC ACID PO   Take 10 drops by mouth daily Super Liquid Folate   Next Dose Due:  6/4/18 AM                                   GLUCOSAMINE-CHONDROITIN PO   Take 1 tablet by mouth daily   Next Dose Due:  6/4/18 AM                                   HAIR SKIN NAILS PO   Take 2 tablets by mouth daily   Next Dose Due:  6/4/18 AM                                   KLS ALLER-ISIDRA PO   Take 10 mg by mouth daily   Next Dose Due:  6/4/18 AM                                   KRILL OIL PO   Take 500 mg by mouth daily   Next Dose Due:  6/4/18 AM                                   LIPOIC ACID PO   Take 300 mg by mouth 2 times daily   Next Dose Due:  6/4/18 AM                                   LOSARTAN POTASSIUM PO   Take 25 mg by mouth 2 times daily   Last time this was given:  25 mg on 6/3/2018  8:37 AM   Next Dose Due:  6/3/18 EVENING                                      MAGNESIUM PO   Take 250 mg by mouth daily   Next Dose Due:  6/4/18 AM                                   MELATONIN PO   Take 10 tablets by mouth daily   Last time this was given:  10 mg on 6/3/2018  1:39 AM   Next Dose Due:  6/3/18 BEDTIME                                   METOPROLOL SUCCINATE ER PO   Take 25 mg by mouth daily (Takes 0.5 x 50mg tablet = 25mg dose)   Last time this was given:  25 mg on 6/3/2018  8:37 AM   Next Dose Due:  6/4/18 AM                                   minoxidil 2 % external solution   Commonly known as:  ROGAINE   Apply topically 2 times daily   Next Dose Due:  6/3/18 EVENING                                      OMEPRAZOLE PO   Take 20 mg by mouth daily    Last time this was given:  20 mg on 6/3/2018  8:37 AM   Next Dose Due:  6/4/18 AM                                   PROBIOTIC PO   Take 2 tablets by mouth daily   Next Dose Due:  6//18 EVENING                                      SPIRONOLACTONE PO   Take 25 mg by mouth daily   Last time this was given:  25 mg on 6/3/2018  8:37 AM   Next Dose Due:  6/4/18 AM                                   SUPER B COMPLEX PO   Take 1 tablet by mouth daily   Next Dose Due:  6/4/18 AM                                   * TRAMADOL HCL PO   Take 50 mg by mouth 3 times daily as needed   Last time this was given:  50 mg on 6/3/2018  3:45 AM   Notes to Patient:  Take only every 6 hours as needed for pain, not three times in addition to every 6 hours for pain                                * traMADol 50 MG tablet   Commonly known as:  ULTRAM   Take 1 tablet (50 mg) by mouth every 6 hours as needed for moderate pain   Last time this was given:  50 mg on 6/3/2018  3:45 AM   Next Dose Due:  6/3/18 3:00 PM as needed   Notes to Patient:  You had Oxycodone 10mg at 10:45, do not take anymore pain medication until at least 3:00 PM                                   TURMERIC PO   Take 500 mg by mouth 2 times daily   Next Dose Due:  6/3/18 EVENING                                      VITAMIN C PO   Take 1,000 mg by mouth 3 times daily   Next Dose Due:  6/3/18 AFTERNOON                                         VITAMIN D-VITAMIN K PO   Take 15 drops by mouth daily   Next Dose Due:  6/4/18 AM                                   VITAMIN E PO   Take 400 Units by mouth daily   Next Dose Due:  6/4/18 AM                                   * Notice:  This list has 2 medication(s) that are the same as other medications prescribed for you. Read the directions carefully, and ask your doctor or other care provider to review them with you.              More Information        Urinary Tract Infections in Women    Urinary tract infections (UTIs) are most often caused  by bacteria. These bacteria enter the urinary tract. The bacteria may come from outside the body. Or they may travel from the skin outside the rectum or vagina into the urethra. Female anatomy makes it easy for bacteria from the bowel to enter a woman s urinary tract, which is the most common source of UTI. This means women develop UTIs more often than men. Pain in or around the urinary tract is a common UTI symptom. But the only way to know for sure if you have a UTI for the healthcare provider to test your urine. The two tests that may be done are the urinalysis and urine culture.  Types of UTIs    Cystitis. A bladder infection (cystitis) is the most common UTI in women. You may have urgent or frequent urination. You may also have pain, burning when you urinate, and bloody urine.    Urethritis. This is an inflamed urethra, which is the tube that carries urine from the bladder to outside the body. You may have lower stomach or back pain. You may also have urgent or frequent urination.    Pyelonephritis. This is a kidney infection. If not treated, it can be serious and damage your kidneys. In severe cases, you may need to stay in the hospital. You may have a fever and lower back pain.  Medicines to treat a UTI  Most UTIs are treated with antibiotics. These kill the bacteria. The length of time you need to take them depends on the type of infection. It may be as short as 3 days. If you have repeated UTIs, you may need a low-dose antibiotic for several months. Take antibiotics exactly as directed. Don t stop taking them until all of the medicine is gone. If you stop taking the antibiotic too soon, the infection may not go away. You may also develop a resistance to the antibiotic. This can make it much harder to treat.  Lifestyle changes to treat and prevent UTIs  The lifestyle changes below will help get rid of your UTI. They may also help prevent future UTIs.    Drink plenty of fluids. This includes water, juice, or  other caffeine-free drinks. Fluids help flush bacteria out of your body.    Empty your bladder. Always empty your bladder when you feel the urge to urinate. And always urinate before going to sleep. Urine that stays in your bladder can lead to infection. Try to urinate before and after sex as well.    Practice good personal hygiene. Wipe yourself from front to back after using the toilet. This helps keep bacteria from getting into the urethra.    Use condoms during sex. These help prevent UTIs caused by sexually transmitted bacteria. Also don't use spermicides during sex. These can increase the risk for UTIs. Choose other forms of birth control instead. For women who tend to get UTIs after sex, a low-dose of a preventive antibiotic may be used. Be sure to discuss this option with your healthcare provider.    Follow up with your healthcare provider as directed. He or she may test to make sure the infection has cleared. If needed, more treatment may be started.  Date Last Reviewed: 1/1/2017 2000-2017 [employer]. [address] [place] [address], [place] [address]. All rights reserved. This information is not intended as a substitute for professional medical care. Always follow your healthcare professional's instructions.

## 2018-05-30 NOTE — OP NOTE
Procedure Date: 05/30/2018      DATE OF SURGERY:  05/30/2018      PREOPERATIVE DIAGNOSIS:  Degenerative spondylolisthesis of L4 on L5 with severe central stenosis and bilateral leg radiculopathy.      POSTOPERATIVE DIAGNOSIS:  Degenerative spondylolisthesis of L4 on L5 with severe central stenosis and bilateral leg radiculopathy.      PROCEDURES:   1.  Posterior spinal fusion L4-5 with morcellized autologous bone graft.   2.  Bilateral L4-5 decompression.   3.  Posterior nonsegmental instrumentation with Medtronic TSRH 3D instrumentation.   4.  [place] right posterior iliac crest bone graft.      SURGEON:  Price Prajapati MD.        ASSISTANT:  Debra Downs PA-C.        ANESTHESIA:  General.      PQI REQUIREMENTS:    1.  The patient was fitted with pneumatic compression boots  just prior to the surgery.  These were kept operational throughout the surgery.  Chemical anticoagulants cannot be utilized because of the risk of epidural hematoma.   2.  The patient was given 2 grams of Ancef within 1/2 hour prior to starting the surgery.  She will be kept on IV antibiotics for 24 hours and stopped as appropriate.  She was redosed at every 2 hours during the surgery.      OPERATIVE DESCRIPTION:  The patient was brought to the operating room.  A general anesthetic was administered.  A Rehman catheter was placed.  Care was taken to use proper tape and use proper gloves due to her latex allergy.      She was carefully then positioned prone on the [facility] table.  She was carefully padded and positioned and her back was prepped and draped in a routine sterile fashion.      After the timeout, a midline skin incision was made over the lower lumbar spine.  Electrocautery was used to develop the incision down to the fascia.  The fascia was incised and a clamp was placed on the spinous process of what was felt to be L5 and L3.  Lateral x-ray was obtained.  This confirmed that it was the spinous process of L3 and L5.  These were  marked with a rongeur.  A bilateral exposure out to the tips of the transverse processes of L4 and L5 was performed.   Incision was extended up to L2 and down to S1.  Care was taken to protect the facet joint capsules at L2-3 and L3-4 and L5-S1.      Self-retaining retractors were placed.  Soft tissue was mobilized from the lamina of L4 and L5 bilaterally.  Bilateral facet capsulectomies were done at L4-5.  The spinous process of L4 was removed down to its base and a partial spinous process removal of L5.  She had severe shingling of her lamina of L4 and L5.  The facet joints were markedly arthritic.  Facet capsules posteriorly at L4 and L5 were ossified.  These had to be removed.  Medial facetectomies were done bilaterally at L4 removing approximately half of the inferior articular facet of L4 bilaterally.  Medial facetectomies of L5 on the superior articular processes were also performed.  Both L5 nerve roots were exposed and exposure was carried up to the takeoff from the dural sac.  The entrance to the foramina bilaterally were also opened up.  She had very thick severe stenosis bilaterally.  All of the ligamentum flavum was removed up to the undersurface of the lamina of L4.  When I was done with the decompression, I had good visualization of both traversing L5 nerve roots.  I could feel the pedicles and the decompression was carried out just to the medial wall of the pedicle.      At this point then the wound was irrigated with antibiotic solution.      The pedicles of L4 and L5 were opened with a bur, followed by an awl, blunt probe and then markers were placed in the pedicles.  AP and lateral C-arm views were obtained showing good position of the markers in the pedicles with good depth and angulation.  The pedicles were then tapped, probed to make sure there was no violation and then 6.5 x 45 mm screws were placed bilaterally at L4 and L5.  I had relatively good purchase with the screws, although her bone  was soft.  AP and lateral C-arm views were again obtained showing good position of the screws with good depth and angulation.      A subcutaneous dissection over the right iliac crest was performed.  The fascia over the crest was incised.  The outer table was exposed.  Bone graft was harvested using osteotomes and gouges.  Again, her bone was noted to be soft but relatively good quality with very little fatty infiltration.  When an adequate amount had been harvested, the bleeding cancellous surface was covered with bone wax, irrigated with antibiotic solution and the fascia was repaired with a V-Loc suture and interrupted #1 Vicryl suture.      Attention was turned back to the midline.  The wound was irrigated with antibiotic solution.  A bur was then used to clean all the soft tissue off the posterior elements of L4 and L5.  A bur down the facet joints and decorticate the transverse processes bilaterally.  Another liter of antibiotic solution was then used.  A Capener gouge was used to fish-scale the posterior elements.  After final irrigation, the I-bolts and connecting rods were attached and tightened to specification.  Bone wax was placed over the lateral gutters bilaterally and over the exposed lamina and facet joints.  We had a good amount of bone graft and good quality.  The nerves were checked one final time.  Gelfoam was placed over the dura and nerve roots.  A Hemovac drain was placed under the fascia.  The wound was then closed in routine fashion with running and interrupted #1 Vicryl suture for the fascia, 2-0 for the subq with another drain in the subq and 3-0 Vicryl for the skin.  Dressings were applied, drapes removed, she was transferred to the hospital cart and taken to the recovery room in good condition.  Estimated blood loss was 250 mL, complications none, drains 2 Hemovacs.         WILY HANKINS MD             D: 05/30/2018   T: 05/30/2018   MT: LIBRADO      Name:     LIVIER MOROCHO   MRN:       [id number]        Account:        [id number]   :      1940           Procedure Date: 2018      Document: [id number]       cc: Price Medina MD

## 2018-05-30 NOTE — BRIEF OP NOTE
[facility]  Spinal Surgery Brief Operative Note    Pre-operative diagnosis: DEGENERATIVE SPONDYLOLITHESIS L4-5 WITH SEVERE CENTRAL STENOSIS, BILATERAL LEG RADICULOPATHY   Post-operative diagnosis: Same   Procedure: PSF L4-5  BILAT L4-5 DECOMPRESSION  POST INSTRUMENTTION MEDTRONIC TSRH  RIGHT ILIAC CREST BONE GRAFT HARVEST   Surgeon: WILY HANKINS MD   Assistant(s): ORTEGA PRASAD PA-C   Anesthesia: General endotracheal anesthesia   Estimated blood loss: 250 ML   Total IV fluids: (See anesthesia record)   Blood transfusion: NONE   Drains: Hemovac X2   Specimens: NONE   Implants: AS ABOVE   Findings: AS ABOVE   Complications: NONE   Condition: STABLE   Comments: SEE DICTATED OPERATIVE REPORT FOR DETAILS

## 2018-05-30 NOTE — ANESTHESIA CARE TRANSFER NOTE
Patient: Lisa Johnston    Procedure(s):  BILATERAL L4-5 DECOMPRESSION, L4-5 POSTERIOR SPINAL FUSION WITH MEDTRONIC INSTRUMENTATION, RIGHT ILIAC CREST BONE GRAFT  - Wound Class: I-Clean      Diagnosis: DEGENERATIVE SPONDYLOLITHESIS L4-5 WITH SEVERE CENTRAL STENOSIS, BILATERAL LEG RADICULOPATHY  Diagnosis Additional Information: No value filed.    Anesthesia Type:   General, ETT     Note:  Airway :Face Mask  Patient transferred to:PACU  Handoff Report: Identifed the Patient, Identified the Reponsible Provider, Reviewed the pertinent medical history, Discussed the surgical course, Reviewed Intra-OP anesthesia mangement and issues during anesthesia, Set expectations for post-procedure period and Allowed opportunity for questions and acknowledgement of understanding    Neuromuscular blockade reversed after TOF 4/4, spontaneous respirations, adequate tidal volumes, followed commands to voice, oropharynx suctioned with soft flexible catheter, extubated atraumatically, extubated with suction, airway patent after extubation.  Oxygen via facemask at 10 liters per minute to PACU. Oxygen tubing connected to wall O2 in PACU, SpO2, NiBP, and EKG monitors and alarms on and functioning, report on patient's clinical status given to PACU RN, RN questions answered.     Electronically Signed By: AMADOR Mortensen CRNA  May 30, 2018  2:11 PM

## 2018-05-30 NOTE — OR NURSING
PNDS Criteria met.  Order received per Dr. Escamilla to transfer to [facility] for continued care.  Hand-off report to RN.  To 712-01 per cart with all belongings; will transport with Capnography monitoring

## 2018-05-30 NOTE — PROGRESS NOTES
S: Pt was seen today at [facility] for eval/fitting for an Lumbar sacral support.  OG:  Lumbar sacral support.  A: At this time I have fit pt with a size large Quikdraw Rap Complete LSO corset with Velcro closures. The pt was instructed on donning/doffing; care and cleaning of the LSO was explained. Care was taken in selection of the proper size LSO to insure an intimate fit.  Upon completion of the initial fitting the pt had no complaints, and the fit of the orthosis appeared to be satisfactory at this time.   P: The pt was instructed to call if any problems or questions arise.

## 2018-05-30 NOTE — IP AVS SNAPSHOT
[facility]    [address]    [place] [address]    Phone:  [phone]                                       After Visit Summary   5/30/2018    Lisa Johnston    MRN: [id number]           After Visit Summary Signature Page     I have received my discharge instructions, and my questions have been answered. I have discussed any challenges I see with this plan with the nurse or doctor.    ..........................................................................................................................................  Patient/Patient Representative Signature      ..........................................................................................................................................  Patient Representative Print Name and Relationship to Patient    ..................................................               ................................................  Date                                            Time    ..........................................................................................................................................  Reviewed by Signature/Title    ...................................................              ..............................................  Date                                                            Time

## 2018-05-30 NOTE — ANESTHESIA PREPROCEDURE EVALUATION
Anesthesia Evaluation     .             ROS/MED HX    ENT/Pulmonary:     (+)KATIE risk factors snores loudly, hypertension, obese, asthma , . .   (-) tobacco use, sleep apnea and allergic rhinitis   Neurologic:     (+)neuropathy CVA with deficits- Left lower quadrant of left eye and lower iner quadrant of right eye visual field deficits ,     Cardiovascular: Comment: H/o skipped beats    (+) Dyslipidemia, hypertension----. : . . . :. .       METS/Exercise Tolerance:     Hematologic:         Musculoskeletal:   (+) arthritis, , , -       GI/Hepatic:        (-) GERD   Renal/Genitourinary:     (+) chronic renal disease, type: CRI,       Endo:     (+) Obesity, .      Psychiatric:         Infectious Disease:         Malignancy:         Other:                     Physical Exam  Normal systems: cardiovascular, pulmonary and dental    Airway   Mallampati: III  TM distance: >3 FB  Neck ROM: full    Dental     Cardiovascular       Pulmonary                     Anesthesia Plan      History & Physical Review  History and physical reviewed and following examination; no interval change.    ASA Status:  2 .    NPO Status:  > 8 hours    Plan for General and ETT with Intravenous and Propofol induction. Maintenance will be Inhalation.    PONV prophylaxis:  Ondansetron (or other 5HT-3) and Dexamethasone or Solumedrol  Decadron and Zofran       Postoperative Care  Postoperative pain management:  IV analgesics and Oral pain medications.      Consents  Anesthetic plan, risks, benefits and alternatives discussed with:  Patient, Spouse and Daughter/Son..                          .

## 2018-05-30 NOTE — ANESTHESIA POSTPROCEDURE EVALUATION
Patient: Lisa Johnston    Procedure(s):  BILATERAL L4-5 DECOMPRESSION, L4-5 POSTERIOR SPINAL FUSION WITH MEDTRONIC INSTRUMENTATION, RIGHT ILIAC CREST BONE GRAFT  - Wound Class: I-Clean      Diagnosis:DEGENERATIVE SPONDYLOLITHESIS L4-5 WITH SEVERE CENTRAL STENOSIS, BILATERAL LEG RADICULOPATHY  Diagnosis Additional Information: No value filed.    Anesthesia Type:  General, ETT    Note:  Anesthesia Post Evaluation    Patient location during evaluation: PACU  Patient participation: Able to fully participate in evaluation  Level of consciousness: awake and alert  Pain management: adequate  Airway patency: patent  Cardiovascular status: acceptable  Respiratory status: acceptable  Hydration status: acceptable  PONV: none and controlled     Anesthetic complications: None          Last vitals:  Vitals:    05/30/18 1440 05/30/18 1450 05/30/18 1500   BP: 117/60 (!) 121/93 111/57   Resp: 11 15 14   Temp:   36.1  C (97  F)   SpO2: 94% 95% 97%         Electronically Signed By: Amrik Escamilla MD  May 30, 2018  3:23 PM

## 2018-05-31 ENCOUNTER — APPOINTMENT (OUTPATIENT)
Dept: PHYSICAL THERAPY | Facility: CLINIC | Age: 78
DRG: 460 | End: 2018-05-31
Attending: ORTHOPAEDIC SURGERY
Payer: MEDICARE

## 2018-05-31 LAB — HGB BLD-MCNC: 10.3 G/DL (ref 11.7–15.7)

## 2018-05-31 PROCEDURE — 85018 HEMOGLOBIN: CPT | Performed by: PHYSICIAN ASSISTANT

## 2018-05-31 PROCEDURE — 97530 THERAPEUTIC ACTIVITIES: CPT | Mod: GP | Performed by: PHYSICAL THERAPIST

## 2018-05-31 PROCEDURE — 97116 GAIT TRAINING THERAPY: CPT | Mod: GP | Performed by: PHYSICAL THERAPIST

## 2018-05-31 PROCEDURE — A9270 NON-COVERED ITEM OR SERVICE: HCPCS | Mod: GY | Performed by: PHYSICIAN ASSISTANT

## 2018-05-31 PROCEDURE — 40000193 ZZH STATISTIC PT WARD VISIT: Performed by: PHYSICAL THERAPIST

## 2018-05-31 PROCEDURE — 25000132 ZZH RX MED GY IP 250 OP 250 PS 637: Mod: GY | Performed by: PHYSICIAN ASSISTANT

## 2018-05-31 PROCEDURE — 25000125 ZZHC RX 250: Performed by: ORTHOPAEDIC SURGERY

## 2018-05-31 PROCEDURE — 36415 COLL VENOUS BLD VENIPUNCTURE: CPT | Performed by: PHYSICIAN ASSISTANT

## 2018-05-31 PROCEDURE — 12000000 ZZH R&B MED SURG/OB

## 2018-05-31 PROCEDURE — 25000125 ZZHC RX 250: Performed by: PHYSICIAN ASSISTANT

## 2018-05-31 PROCEDURE — 25000132 ZZH RX MED GY IP 250 OP 250 PS 637: Mod: GY | Performed by: ORTHOPAEDIC SURGERY

## 2018-05-31 PROCEDURE — A9270 NON-COVERED ITEM OR SERVICE: HCPCS | Mod: GY | Performed by: ORTHOPAEDIC SURGERY

## 2018-05-31 PROCEDURE — 97112 NEUROMUSCULAR REEDUCATION: CPT | Mod: GP | Performed by: PHYSICAL THERAPIST

## 2018-05-31 PROCEDURE — 97161 PT EVAL LOW COMPLEX 20 MIN: CPT | Mod: GP | Performed by: PHYSICAL THERAPIST

## 2018-05-31 RX ORDER — CLINDAMYCIN PHOSPHATE 900 MG/50ML
900 INJECTION, SOLUTION INTRAVENOUS EVERY 8 HOURS
Status: DISCONTINUED | OUTPATIENT
Start: 2018-05-31 | End: 2018-06-01

## 2018-05-31 RX ADMIN — OMEPRAZOLE 20 MG: 20 CAPSULE, DELAYED RELEASE ORAL at 10:46

## 2018-05-31 RX ADMIN — OXYCODONE HYDROCHLORIDE 5 MG: 5 TABLET ORAL at 13:48

## 2018-05-31 RX ADMIN — Medication 25 MG: at 10:45

## 2018-05-31 RX ADMIN — SENNOSIDES AND DOCUSATE SODIUM 2 TABLET: 8.6; 5 TABLET ORAL at 10:46

## 2018-05-31 RX ADMIN — CLINDAMYCIN PHOSPHATE 900 MG: 18 INJECTION, SOLUTION INTRAVENOUS at 02:30

## 2018-05-31 RX ADMIN — METOPROLOL SUCCINATE 25 MG: 25 TABLET, EXTENDED RELEASE ORAL at 10:46

## 2018-05-31 RX ADMIN — SENNOSIDES AND DOCUSATE SODIUM 2 TABLET: 8.6; 5 TABLET ORAL at 21:47

## 2018-05-31 RX ADMIN — OXYCODONE HYDROCHLORIDE 10 MG: 5 TABLET ORAL at 18:04

## 2018-05-31 RX ADMIN — CLINDAMYCIN PHOSPHATE 900 MG: 18 INJECTION, SOLUTION INTRAVENOUS at 10:47

## 2018-05-31 RX ADMIN — Medication 10 MG: at 21:46

## 2018-05-31 RX ADMIN — ACETAMINOPHEN 975 MG: 325 TABLET, FILM COATED ORAL at 08:16

## 2018-05-31 RX ADMIN — OXYCODONE HYDROCHLORIDE 5 MG: 5 TABLET ORAL at 13:12

## 2018-05-31 RX ADMIN — ACETAMINOPHEN 975 MG: 325 TABLET, FILM COATED ORAL at 16:24

## 2018-05-31 RX ADMIN — LOSARTAN POTASSIUM 25 MG: 25 TABLET, FILM COATED ORAL at 21:46

## 2018-05-31 RX ADMIN — LOSARTAN POTASSIUM 25 MG: 25 TABLET, FILM COATED ORAL at 10:46

## 2018-05-31 RX ADMIN — ONDANSETRON 4 MG: 4 TABLET, ORALLY DISINTEGRATING ORAL at 14:34

## 2018-05-31 RX ADMIN — ACETAMINOPHEN 975 MG: 325 TABLET, FILM COATED ORAL at 23:12

## 2018-05-31 RX ADMIN — CLINDAMYCIN PHOSPHATE 900 MG: 18 INJECTION, SOLUTION INTRAVENOUS at 18:02

## 2018-05-31 RX ADMIN — Medication 1 LOZENGE: at 23:18

## 2018-05-31 ASSESSMENT — ACTIVITIES OF DAILY LIVING (ADL)
ADLS_ACUITY_SCORE: 9

## 2018-05-31 NOTE — PLAN OF CARE
Problem: Patient Care Overview  Goal: Plan of Care/Patient Progress Review  PT: orders received. Chart reviewed. Evaluation completed and treatment initiated. Pt is a 77 year old female POD#1 L4-L5 decompression and fusion. Pt must wear brace when up. Pt lives in house with spouse wth a few steps to enter. PLOF, pt was independent with ADLs, but spouse did all chores and errands.   Discharge Planner PT   Patient plan for discharge: To return home with assist of spouse  Current status: Pt educated on body mechanis and spinal precautions and given handout. Pt rolls with Fawn and cues. Pt  Needs assist to don brace. Pt sits EOB with modA. Pt stands with assist of one and ambulated with wh walker for 50ft . Pt pain is tolerable. Pt remained in chair.   Barriers to return to prior living situation: 2-3 steps to enter  Recommendations for discharge: Home with assist and may need to use AD  Rationale for recommendations: Pt is motivated and mobility should be appropriate for use at home with spouse       Entered by: Nicole Cole 05/31/2018 12:35 PM

## 2018-05-31 NOTE — PROGRESS NOTES
[facility]    Spine Surgery  Daily Post-Op Note    Assessment & Plan   Procedure(s):  COMBINED DECOMPRESSION, FUSION LUMBAR POSTERIOR ONE LEVEL  GRAFT BONE FROM ILIAC CREST   -1 Day Post-Op      ASSESSMENT:  Doing well postop day #1, status post posterior spine fusion and decompression L4 to L5    PLAN:  Started p.o. pain meds today, mobilize as tolerated    Price Prajapati MD      Interval History   Doing well, up to side of bed and ambulated to the doorway last night.  Pain is well controlled, had one episode of nausea.  No flatus yet.  Preoperative leg pains are gone.  No shortness of breath.    Physical Exam   Temp: 98.2  F (36.8  C) Temp src: Oral BP: 120/60   Heart Rate: 66 Resp: 15 SpO2: 93 % O2 Device: Nasal cannula Oxygen Delivery: 2 LPM  Vitals:    05/30/18 0807   Weight: 90.3 kg (199 lb)     Vital Signs with Ranges  Temp:  [96.3  F (35.7  C)-98.3  F (36.8  C)] 98.2  F (36.8  C)  Heart Rate:  [57-84] 66  Resp:  [0-18] 15  BP: (104-171)/(48-99) 120/60  SpO2:  [91 %-99 %] 93 %  I/O last 3 completed shifts:  In: 4152 [P.O.:700; I.V.:3452]  Out: 2760 [Urine:2050; Drains:430; Blood:280]    Alert, Oriented  Abd: S,NT,ND  Wound / Dressing: Clean, dry, and intact  Motor: Intact  Sensory: Normal septic or numbness in toes which is old  Vascular: No edema  Hemovac 70 mL and 30 mL    Medications     HYDROmorphone       sodium chloride 75 mL/hr at 05/30/18 1628        acetaminophen  975 mg Oral Q8H     clindamycin  900 mg Intravenous Q8H     losartan (COZAAR) tablet 25 mg  25 mg Oral BID     metoprolol succinate (TOPROL-XL) 24 hr tablet 25 mg  25 mg Oral Daily     omeprazole (priLOSEC) CR capsule 20 mg  20 mg Oral Daily     senna-docusate  1 tablet Oral BID    Or     senna-docusate  2 tablet Oral BID     sodium chloride (PF)  3 mL Intracatheter Q8H     sodium phosphate  1 enema Rectal Once     spironolactone (ALDACTONE) half-tab 25 mg  25 mg Oral Daily       Data   No lab results found in last 7  days.    Recent Results (from the past 24 hour(s))   XR Surgery DAVID L/T 5 Min Fluoro w Stills    Narrative    SURGERY C-ARM FLUORO LESS THAN 5 MIN W STILLS May 30, 2018 2:09 PM     HISTORY: Bilateral L4-L5 decompression and fusion. C-Arm #4. 0.3 min  fluoro time. Nine images. [id number].     COMPARISON: None.    NUMBER OF IMAGES ACQUIRED: Nine.    VIEWS: AP and lateral.    FLUOROSCOPY TIME: .3      Impression    IMPRESSION: Multiple intraoperative views obtained during posterior  access and bilateral transpedicular screw placements at L4 and L5 for  the purposes of posterior lumbar fusion.    CARISSA HOLM MD

## 2018-05-31 NOTE — PLAN OF CARE
Problem: Patient Care Overview  Goal: Plan of Care/Patient Progress Review  Outcome: Improving  Pt is A&Ox4, VS O2 on RA 95%. Pain controlled by oxycodone. Tolerating regular diet-pt has many food allergies. Mobility upx2 gait/walker, has LLE weakness. Has patent augustine & 2 ADITHYA drains. Possible D/C 6/1/18.     3-7p patient had 100.0 temp , tylenol given and encouraged to use IS

## 2018-05-31 NOTE — PLAN OF CARE
Problem: Patient Care Overview  Goal: Plan of Care/Patient Progress Review  POD#0. A&Ox4. CMS intact, initially LLE slightly weaker which pt states she had pre-op, now BLE strength 5/5. Denies N/T. Not up yet. Brace on when OOB. Bowel sounds active, -flatus. Tolerating clears. Hemovac x2. Dressing CDI. Pain decreased with tylenol and PCA dilaudid. VSS, on 2L O2, using IS adequately. Rehman patent. Plan for therapies tomorrow.

## 2018-05-31 NOTE — PROGRESS NOTES
05/31/18 1044   Quick Adds   Type of Visit Initial PT Evaluation   Living Environment   Lives With spouse   Living Arrangements house   Home Accessibility bed and bath on same level;stairs to enter home;stairs within home   Number of Stairs to Enter Home 2   Number of Stairs Within Home (flight of stairs to basement but pt does not do)   Transportation Available family or friend will provide   Self-Care   Usual Activity Tolerance moderate   Current Activity Tolerance moderate   Regular Exercise no   Equipment Currently Used at Home none   Activity/Exercise/Self-Care Comment Pt has a walker from foot surgery but doesn't use. Pt occasionally uses furniture. Pt reports spending most time in a recliner and at home but can go out and use a shopping cart to help balance and for pain   Functional Level Prior   Ambulation 0-->independent   Transferring 0-->independent   Toileting 0-->independent   Bathing 0-->independent   Dressing 0-->independent   Eating 0-->independent   Communication 0-->understands/communicates without difficulty   Swallowing 0-->swallows foods/liquids without difficulty   Cognition 0 - no cognition issues reported   Fall history within last six months no   Which of the above functional risks had a recent onset or change? ambulation   General Information   Onset of Illness/Injury or Date of Surgery - Date 05/30/18   Referring Physician Dr. Prajapati   Patient/Family Goals Statement To return home   Pertinent History of Current Problem (include personal factors and/or comorbidities that impact the POC) Pt is a 77 year old female POD#1 L4-5 fusion and decompression   Precautions/Limitations fall precautions;other (see comments)  (Brace on whe OOB)   Weight-Bearing Status - LLE full weight-bearing   Weight-Bearing Status - RLE full weight-bearing   Cognitive Status Examination   Orientation orientation to person, place and time   Level of Consciousness alert   Follows Commands and Answers Questions 100% of  the time   Personal Safety and Judgment intact   Memory intact   Pain Assessment   Patient Currently in Pain Yes, see Vital Sign flowsheet   Integumentary/Edema   Integumentary/Edema Comments bandage over surgical site   Posture    Posture Comments forward head and shoulders,    Range of Motion (ROM)   ROM Comment limitations in spine consistent with surgery, extremities WFL   Strength   Strength Comments Gross decreased strength in LE due to inactivity from illness and pain. Left LE has been weaker than right but both have full antigravity strength   Bed Mobility   Bed Mobility Comments Given handout and instructions on body mechanics and spinal precautions. ROll with cues and Fawn. Fawn to sit EOB   Transfer Skills   Transfer Comments Sit to stand: Fawn . Pt wearing brace and using walker   Gait   Gait Comments Pt ambulated with walker and CGA. Pt wearing brace. Pt has decreased foot clearance and decreased step length. Cues to stand upright.    Balance   Balance Comments Requires AD for static and dynamic standing activity.   Sensory Examination   Sensory Perception no deficits were identified   Coordination   Coordination no deficits were identified   General Therapy Interventions   Planned Therapy Interventions balance training;bed mobility training;gait training;neuromuscular re-education;strengthening;transfer training;progressive activity/exercise   Clinical Impression   Criteria for Skilled Therapeutic Intervention yes, treatment indicated   PT Diagnosis DIfficulty with gait   Influenced by the following impairments pain, Decreased endurance and gross weakness, spinal precautions, ROM deficitrs in spine   Functional limitations due to impairments increased assist for mobility compared to baseline   Clinical Presentation Stable/Uncomplicated   Clinical Presentation Rationale clinical judegement   Clinical Decision Making (Complexity) Low complexity   Therapy Frequency` 2 times/day   Predicted Duration of  "Therapy Intervention (days/wks) 3 days   Anticipated Discharge Disposition Home with Assist   Risk & Benefits of therapy have been explained Yes   Patient, Family & other staff in agreement with plan of care Yes   [facility] TM \"6 Clicks\"   2016, Trustees of [facility], under license to [employer].  All rights reserved.   6 Clicks Short Forms Basic Mobility Inpatient Short Form   [facility]  \"6 Clicks\" V.2 Basic Mobility Inpatient Short Form   1. Turning from your back to your side while in a flat bed without using bedrails? 3 - A Little   2. Moving from lying on your back to sitting on the side of a flat bed without using bedrails? 2 - A Lot   3. Moving to and from a bed to a chair (including a wheelchair)? 3 - A Little   4. Standing up from a chair using your arms (e.g., wheelchair, or bedside chair)? 3 - A Little   5. To walk in hospital room? 3 - A Little   6. Climbing 3-5 steps with a railing? 2 - A Lot   Basic Mobility Raw Score (Score out of 24.Lower scores equate to lower levels of function) 16   Total Evaluation Time   Total Evaluation Time (Minutes) 12     "

## 2018-05-31 NOTE — PLAN OF CARE
Problem: Patient Care Overview  Goal: Plan of Care/Patient Progress Review  Outcome: Improving   18 0546   OTHER   Plan Of Care Reviewed With patient   Plan of Care Review   Progress improving     Dx: Spinal Stenosis s/p L4-L5 decompression  Hx: Asthma, HLD, and HTN  Labs: B  Tele: NA  Assessment: VSS. A/Ox4. Denies SOB/CP. Pain w/ highest rating 4/10, relieved w/ PRN PCA and repositioning. PIV infusing NS @ 75 mL/hr. Rehman catheter in place w/ good urine output. Hemovac x2 to compression. O2 via NC @ 2 LPM. Pt dangled @ bedside, as well as ambulated in room w/ GB and walker. CMS intact. Back dressing CDI.     Diet: Clear Liquid Diet  Plan: Continue POC. D/C TBD after further evaluation/treatment.       Problem: Laminectomy/Foraminotomy/Discectomy (Adult)  Goal: Signs and Symptoms of Listed Potential Problems Will be Absent, Minimized or Managed (Laminectomy/Foraminotomy/Discectomy)  Signs and symptoms of listed potential problems will be absent, minimized or managed by discharge/transition of care (reference Laminectomy/Foraminotomy/Discectomy (Adult) CPG).   Outcome: Improving   18 0546   Laminectomy/Foraminotomy/Discectomy   Problems Assessed (Laminectomy/Laminotomy/Discectomy) all   Problems Present (Laminectomy/otomy) pain;situational response

## 2018-06-01 ENCOUNTER — APPOINTMENT (OUTPATIENT)
Dept: PHYSICAL THERAPY | Facility: CLINIC | Age: 78
DRG: 460 | End: 2018-06-01
Attending: ORTHOPAEDIC SURGERY
Payer: MEDICARE

## 2018-06-01 ENCOUNTER — APPOINTMENT (OUTPATIENT)
Dept: OCCUPATIONAL THERAPY | Facility: CLINIC | Age: 78
DRG: 460 | End: 2018-06-01
Attending: PHYSICIAN ASSISTANT
Payer: MEDICARE

## 2018-06-01 LAB — HGB BLD-MCNC: 9.9 G/DL (ref 11.7–15.7)

## 2018-06-01 PROCEDURE — 12000000 ZZH R&B MED SURG/OB

## 2018-06-01 PROCEDURE — 97116 GAIT TRAINING THERAPY: CPT | Mod: GP | Performed by: PHYSICAL THERAPIST

## 2018-06-01 PROCEDURE — 25000132 ZZH RX MED GY IP 250 OP 250 PS 637: Mod: GY | Performed by: PHYSICIAN ASSISTANT

## 2018-06-01 PROCEDURE — 25000132 ZZH RX MED GY IP 250 OP 250 PS 637: Mod: GY | Performed by: ORTHOPAEDIC SURGERY

## 2018-06-01 PROCEDURE — A9270 NON-COVERED ITEM OR SERVICE: HCPCS | Mod: GY | Performed by: ORTHOPAEDIC SURGERY

## 2018-06-01 PROCEDURE — 25000125 ZZHC RX 250: Performed by: ORTHOPAEDIC SURGERY

## 2018-06-01 PROCEDURE — 97535 SELF CARE MNGMENT TRAINING: CPT | Mod: GO

## 2018-06-01 PROCEDURE — 85018 HEMOGLOBIN: CPT | Performed by: PHYSICIAN ASSISTANT

## 2018-06-01 PROCEDURE — A9270 NON-COVERED ITEM OR SERVICE: HCPCS | Mod: GY | Performed by: PHYSICIAN ASSISTANT

## 2018-06-01 PROCEDURE — 40000133 ZZH STATISTIC OT WARD VISIT

## 2018-06-01 PROCEDURE — 97530 THERAPEUTIC ACTIVITIES: CPT | Mod: GP | Performed by: PHYSICAL THERAPIST

## 2018-06-01 PROCEDURE — 36415 COLL VENOUS BLD VENIPUNCTURE: CPT | Performed by: PHYSICIAN ASSISTANT

## 2018-06-01 PROCEDURE — 97165 OT EVAL LOW COMPLEX 30 MIN: CPT | Mod: GO

## 2018-06-01 PROCEDURE — 40000193 ZZH STATISTIC PT WARD VISIT: Performed by: PHYSICAL THERAPIST

## 2018-06-01 RX ADMIN — METOPROLOL SUCCINATE 25 MG: 25 TABLET, EXTENDED RELEASE ORAL at 08:43

## 2018-06-01 RX ADMIN — LOSARTAN POTASSIUM 25 MG: 25 TABLET, FILM COATED ORAL at 20:48

## 2018-06-01 RX ADMIN — CLINDAMYCIN PHOSPHATE 900 MG: 18 INJECTION, SOLUTION INTRAVENOUS at 03:36

## 2018-06-01 RX ADMIN — CLINDAMYCIN PHOSPHATE 900 MG: 18 INJECTION, SOLUTION INTRAVENOUS at 04:26

## 2018-06-01 RX ADMIN — SENNOSIDES AND DOCUSATE SODIUM 2 TABLET: 8.6; 5 TABLET ORAL at 20:48

## 2018-06-01 RX ADMIN — OXYCODONE HYDROCHLORIDE 10 MG: 5 TABLET ORAL at 03:35

## 2018-06-01 RX ADMIN — Medication 25 MG: at 08:42

## 2018-06-01 RX ADMIN — LOSARTAN POTASSIUM 25 MG: 25 TABLET, FILM COATED ORAL at 08:43

## 2018-06-01 RX ADMIN — SENNOSIDES AND DOCUSATE SODIUM 2 TABLET: 8.6; 5 TABLET ORAL at 08:42

## 2018-06-01 RX ADMIN — ACETAMINOPHEN 975 MG: 325 TABLET, FILM COATED ORAL at 23:56

## 2018-06-01 RX ADMIN — Medication 10 MG: at 21:01

## 2018-06-01 RX ADMIN — ACETAMINOPHEN 975 MG: 325 TABLET, FILM COATED ORAL at 08:43

## 2018-06-01 RX ADMIN — OMEPRAZOLE 20 MG: 20 CAPSULE, DELAYED RELEASE ORAL at 08:43

## 2018-06-01 RX ADMIN — ACETAMINOPHEN 975 MG: 325 TABLET, FILM COATED ORAL at 15:29

## 2018-06-01 RX ADMIN — OXYCODONE HYDROCHLORIDE 10 MG: 5 TABLET ORAL at 08:39

## 2018-06-01 ASSESSMENT — ACTIVITIES OF DAILY LIVING (ADL)
ADLS_ACUITY_SCORE: 9
ADLS_ACUITY_SCORE: 9
ADLS_ACUITY_SCORE: 11
ADLS_ACUITY_SCORE: 11
ADLS_ACUITY_SCORE: 9
ADLS_ACUITY_SCORE: 9

## 2018-06-01 NOTE — PROGRESS NOTES
06/01/18 1228   Quick Adds   Type of Visit Initial Occupational Therapy Evaluation   Living Environment   Lives With spouse   Living Arrangements house   Home Accessibility bed and bath on same level;stairs to enter home;stairs within home   Number of Stairs to Enter Home 2   Transportation Available family or friend will provide   Self-Care   Dominant Hand right   Usual Activity Tolerance moderate   Current Activity Tolerance fair   Regular Exercise no   Equipment Currently Used at Home none;raised toilet  (commode)   Functional Level Prior   Ambulation 0-->independent   Transferring 0-->independent   Toileting 0-->independent   Bathing 0-->independent   Dressing 0-->independent   Eating 0-->independent   Communication 0-->understands/communicates without difficulty   Swallowing 0-->swallows foods/liquids without difficulty   Cognition 0 - no cognition issues reported   Fall history within last six months no   Prior Functional Level Comment Pt and spouse share all household tasks; pt uses raised toilet seat due to her back pain, has commode which she will use during this recovery since has arms for support   General Information   Onset of Illness/Injury or Date of Surgery - Date 05/30/18   Referring Physician Dr. Prajapati   Patient/Family Goals Statement return home w/spouse assist   Additional Occupational Profile Info/Pertinent History of Current Problem POD#2 L4-5 fusion and decompression   Precautions/Limitations fall precautions;spinal precautions   Cognitive Status Examination   Orientation orientation to person, place and time   Level of Consciousness alert   Able to Follow Commands WNL/WFL   Personal Safety (Cognitive) WNL/WFL   Visual Perception   Visual Perception No deficits were identified   Sensory Examination   Sensory Quick Adds No deficits were identified   Pain Assessment   Patient Currently in Pain Yes, see Vital Sign flowsheet   Coordination   Upper Extremity Coordination No deficits were  identified   Transfer Skill: Sit to Stand   Level of [place]: Sit/Stand stand-by assist   Physical Assist/Nonphysical Assist: Sit/Stand supervision   Transfer Skill: Toilet Transfer   Level of [place]: Toilet stand-by assist   Physical Assist/Nonphysical Assist: Toilet verbal cues   Assistive Device rolling walker;seat riser;grab bars   Upper Body Dressing   Level of [place]: Dress Upper Body minimum assist (75% patients effort)   Physical Assist/Nonphysical Assist: Dress Upper Body 1 person assist   Lower Body Dressing   Level of [place]: Dress Lower Body maximum assist (25% patients effort)   Physical Assist/Nonphysical Assist: Dress Lower Body 1 person assist   Toileting   Level of [place]: Toilet moderate assist (50% patients effort)   Physical Assist/Nonphysical Assist: Toilet 1 person assist   Grooming   Level of [place]: Grooming independent   Physical Assist/Nonphysical Assist: Grooming set-up required   Eating/Self Feeding   Level of [place]: Eating independent   Activities of Daily Living Analysis   Impairments Contributing to Impaired Activities of Daily Living balance impaired;pain;post surgical precautions   General Therapy Interventions   Planned Therapy Interventions ADL retraining;IADL retraining;transfer training   Clinical Impression   Criteria for Skilled Therapeutic Interventions Met yes, treatment indicated   OT Diagnosis decreasedADL/IADL performance   Influenced by the following impairments post-op back pain, spinal precautions, balance   Assessment of Occupational Performance 3-5 Performance Deficits   Identified Performance Deficits functional mobility, dressing, household mgmt, toileting   Clinical Decision Making (Complexity) Low complexity   Therapy Frequency daily   Predicted Duration of Therapy Intervention (days/wks) 2 days   Anticipated Equipment Needs at Discharge (toilet aide)   Anticipated Discharge Disposition Home with Assist   Risks and  "Benefits of Treatment have been explained. Yes   Patient, Family & other staff in agreement with plan of care Yes   [facility] AM-PAC TM \"6 Clicks\"   2016, Trustees of [facility], under license to [employer].  All rights reserved.   6 Clicks Short Forms Daily Activity Inpatient Short Form   Total Evaluation Time   Total Evaluation Time (Minutes) 15     "

## 2018-06-01 NOTE — PLAN OF CARE
Problem: Patient Care Overview  Goal: Plan of Care/Patient Progress Review  Discharge Planner PT   Patient plan for discharge: home with A from spouse  Current status: bed mob with heavy use of rail and min to CGA, will need rail for home, pt in agreement, sit to stand with CGA and FWW, A to don brace and to adjust, amb 180' x 2 with FWW with good gait pattern once instructed, able to complete 3 steps with B rails with CGA  Barriers to return to prior living situation: 3 steps with 1 rail, will cont to address in PT, will need bed rail for home  Recommendations for discharge: home with A from spouse, anticipate min A or less for mob  Rationale for recommendations: cont therapies to progress strength and ease of movement to improve functional mob I and safety       Entered by: BRENDAN MCDONALD 06/01/2018 11:03 AM

## 2018-06-01 NOTE — PLAN OF CARE
Problem: Patient Care Overview  Goal: Plan of Care/Patient Progress Review  Outcome: Improving  Pt is A&Ox4. VVS, on RA 95-98%. D/C'ed hemovacs and augustine, due to void. Passing gas. Dressing changed, new dressing in place. Pain controlled with oxycodone. CMS intact. Possible D/C 6/3/18

## 2018-06-01 NOTE — PLAN OF CARE
Problem: Patient Care Overview  Goal: Plan of Care/Patient Progress Review  Discharge Planner OT   Patient plan for discharge: home w/spouse assist  Current status: OT eval completed and treatment initiated POD#2 L4-5 fusion and decompression. Spouse present and pt up in chair. She performed sit<>stand to walker SBA, amb in room with walker SBA, toilet commode transfer with cues SBA. Training completed in modified LB dressing strategies. OT to see for one additional session for training in proper body mechanics to be incorporated with daily tasks and toilet hygiene.   Barriers to return to prior living situation: none noted   Recommendations for discharge: home w/spouse assist  Rationale for recommendations: pt making good progress, anticipate she will meet OT goals and be able to dc home w/spouse assist       Entered by: Felix Marcano 06/01/2018 12:47 PM

## 2018-06-01 NOTE — PROGRESS NOTES
[facility]    Spine Surgery  Daily Post-Op Note    Assessment & Plan   Procedure(s):  COMBINED DECOMPRESSION, FUSION LUMBAR POSTERIOR ONE LEVEL  GRAFT BONE FROM ILIAC CREST   -2 Days Post-Op      ASSESSMENT:  Doing well postop day #2, status post posterior spine fusion and decompression L4 to L5    PLAN:  Advanced diet to regular,  DC Rehman catheter, saline lock IV, DC antibiotics  DC Hemovac drains and change dressing  DC pneumoboots now the patient is ambulating    Price Prajapati MD      Interval History   Doing well, required some pain medication overnight.  Ambulated yesterday to go down the halls.  Passing flatus, no bowel movement.    Physical Exam   Temp: 98.9  F (37.2  C) Temp src: Oral BP: 105/46   Heart Rate: 89 Resp: 16 SpO2: 94 % O2 Device: Nasal cannula Oxygen Delivery: 2 LPM  Vitals:    05/30/18 0807   Weight: 90.3 kg (199 lb)     Vital Signs with Ranges  Temp:  [98.2  F (36.8  C)-100  F (37.8  C)] 98.9  F (37.2  C)  Heart Rate:  [68-89] 89  Resp:  [16] 16  BP: (105-140)/(46-87) 105/46  SpO2:  [94 %-98 %] 94 %  I/O last 3 completed shifts:  In: 240 [P.O.:240]  Out: 4445 [Urine:4350; Drains:95]    Alert, Oriented  Abd: S,NT,ND  Wound / Dressing: Clean, dry, and intact  CMS is intact  Hemovac drains 10 mL deep and 15 mL superficial    Medications        acetaminophen  975 mg Oral Q8H     clindamycin  900 mg Intravenous Q8H     losartan (COZAAR) tablet 25 mg  25 mg Oral BID     metoprolol succinate (TOPROL-XL) 24 hr tablet 25 mg  25 mg Oral Daily     omeprazole (priLOSEC) CR capsule 20 mg  20 mg Oral Daily     senna-docusate  1 tablet Oral BID    Or     senna-docusate  2 tablet Oral BID     sodium chloride (PF)  3 mL Intracatheter Q8H     sodium phosphate  1 enema Rectal Once     spironolactone (ALDACTONE) half-tab 25 mg  25 mg Oral Daily       Data     Recent Labs  Lab 05/31/18  0800   HGB 10.3*       No results found for this or any previous visit (from the past 24  hour(s)).

## 2018-06-01 NOTE — PLAN OF CARE
Problem: Patient Care Overview  Goal: Plan of Care/Patient Progress Review  Outcome: Improving   06/01/18 0631   OTHER   Plan Of Care Reviewed With patient   Plan of Care Review   Progress improving     Dx: Spinal Stenosis s/p L4-L5 decompression  Hx: Asthma, HLD, and HTN  Tele: NA  Assessment: VSS. A/Ox4. Denies SOB/CP. Pain w/ highest rating 6/10, relieved w/ repositioning and PRN PO Oxycodone. PIV saline locked. Rehman catheter in place w/ good urine output. Hemovac x2 to compression. O2 via NC @ 2 LPM. Pt dangled @ bedside d/t pain, w/ positive relief. CMS intact. Back dressing CDI.     Diet: Regular Diet  Plan: Continue POC. D/C TBD after further evaluation/treatment.

## 2018-06-01 NOTE — PLAN OF CARE
Problem: Patient Care Overview  Goal: Plan of Care/Patient Progress Review  Outcome: No Change  A&Ox4. POD 2. Baseline numbness in bilateral great toes. Denies N/T in arms and legs. Bowel sounds active in all quadrants, + flatus (-)BM. 1st  mL, 2nd  mL. VSS, on 2L O2 nasal cannula at night only. Dressing CDI. Regular diet, good appetite. Infrequent cough w/ white- yellow sputum no temperature. States she has seasonal allergies. Up with 2/gb/w. Brace when OOB. C/o mild lower back pain, decreased with scheduled tylenol. Plan possible d/c 6/3.

## 2018-06-02 ENCOUNTER — APPOINTMENT (OUTPATIENT)
Dept: PHYSICAL THERAPY | Facility: CLINIC | Age: 78
DRG: 460 | End: 2018-06-02
Attending: ORTHOPAEDIC SURGERY
Payer: MEDICARE

## 2018-06-02 ENCOUNTER — APPOINTMENT (OUTPATIENT)
Dept: OCCUPATIONAL THERAPY | Facility: CLINIC | Age: 78
DRG: 460 | End: 2018-06-02
Attending: ORTHOPAEDIC SURGERY
Payer: MEDICARE

## 2018-06-02 LAB
ALBUMIN UR-MCNC: 30 MG/DL
ALBUMIN UR-MCNC: ABNORMAL MG/DL
APPEARANCE UR: ABNORMAL
APPEARANCE UR: ABNORMAL
BACTERIA #/AREA URNS HPF: ABNORMAL /HPF
BACTERIA #/AREA URNS HPF: ABNORMAL /HPF
BACTERIA SPEC CULT: NORMAL
BILIRUB UR QL STRIP: ABNORMAL
BILIRUB UR QL STRIP: NEGATIVE
COLOR UR AUTO: ABNORMAL
COLOR UR AUTO: YELLOW
CREAT SERPL-MCNC: 0.9 MG/DL (ref 0.52–1.04)
GFR SERPL CREATININE-BSD FRML MDRD: 60 ML/MIN/1.7M2
GLUCOSE UR STRIP-MCNC: ABNORMAL MG/DL
GLUCOSE UR STRIP-MCNC: NEGATIVE MG/DL
HGB BLD-MCNC: 9.5 G/DL (ref 11.7–15.7)
HGB UR QL STRIP: ABNORMAL
HGB UR QL STRIP: ABNORMAL
HYALINE CASTS #/AREA URNS LPF: 4 /LPF (ref 0–2)
KETONES UR STRIP-MCNC: 10 MG/DL
KETONES UR STRIP-MCNC: ABNORMAL MG/DL
LEUKOCYTE ESTERASE UR QL STRIP: ABNORMAL
LEUKOCYTE ESTERASE UR QL STRIP: ABNORMAL
MUCOUS THREADS #/AREA URNS LPF: PRESENT /LPF
MUCOUS THREADS #/AREA URNS LPF: PRESENT /LPF
NITRATE UR QL: ABNORMAL
NITRATE UR QL: NEGATIVE
PH UR STRIP: 5.5 PH (ref 5–7)
PH UR STRIP: ABNORMAL PH (ref 5–7)
RBC #/AREA URNS AUTO: 0 /HPF (ref 0–2)
RBC #/AREA URNS AUTO: ABNORMAL /HPF (ref 0–2)
SOURCE: ABNORMAL
SOURCE: ABNORMAL
SP GR UR STRIP: 1.01 (ref 1–1.03)
SP GR UR STRIP: ABNORMAL (ref 1–1.03)
SPECIMEN SOURCE: NORMAL
SQUAMOUS #/AREA URNS AUTO: 4 /HPF (ref 0–1)
UROBILINOGEN UR STRIP-MCNC: ABNORMAL MG/DL (ref 0–2)
UROBILINOGEN UR STRIP-MCNC: NORMAL MG/DL (ref 0–2)
WBC #/AREA URNS AUTO: >182 /HPF (ref 0–5)
WBC #/AREA URNS AUTO: ABNORMAL /HPF
WBC CLUMPS #/AREA URNS HPF: PRESENT /HPF

## 2018-06-02 PROCEDURE — 87088 URINE BACTERIA CULTURE: CPT | Performed by: ORTHOPAEDIC SURGERY

## 2018-06-02 PROCEDURE — 81001 URINALYSIS AUTO W/SCOPE: CPT | Performed by: ORTHOPAEDIC SURGERY

## 2018-06-02 PROCEDURE — 40000133 ZZH STATISTIC OT WARD VISIT: Performed by: REHABILITATION PRACTITIONER

## 2018-06-02 PROCEDURE — 97535 SELF CARE MNGMENT TRAINING: CPT | Mod: GO | Performed by: REHABILITATION PRACTITIONER

## 2018-06-02 PROCEDURE — 99222 1ST HOSP IP/OBS MODERATE 55: CPT | Performed by: HOSPITALIST

## 2018-06-02 PROCEDURE — A9270 NON-COVERED ITEM OR SERVICE: HCPCS | Mod: GY | Performed by: PHYSICIAN ASSISTANT

## 2018-06-02 PROCEDURE — 99207 ZZC CONSULT E&M CHANGED TO INITIAL LEVEL: CPT | Performed by: HOSPITALIST

## 2018-06-02 PROCEDURE — 87086 URINE CULTURE/COLONY COUNT: CPT | Performed by: ORTHOPAEDIC SURGERY

## 2018-06-02 PROCEDURE — 97116 GAIT TRAINING THERAPY: CPT | Mod: GP | Performed by: PHYSICAL THERAPY ASSISTANT

## 2018-06-02 PROCEDURE — 36415 COLL VENOUS BLD VENIPUNCTURE: CPT | Performed by: PHYSICIAN ASSISTANT

## 2018-06-02 PROCEDURE — 85018 HEMOGLOBIN: CPT | Performed by: PHYSICIAN ASSISTANT

## 2018-06-02 PROCEDURE — A9270 NON-COVERED ITEM OR SERVICE: HCPCS | Mod: GY | Performed by: ORTHOPAEDIC SURGERY

## 2018-06-02 PROCEDURE — 87186 SC STD MICRODIL/AGAR DIL: CPT | Performed by: ORTHOPAEDIC SURGERY

## 2018-06-02 PROCEDURE — 25000132 ZZH RX MED GY IP 250 OP 250 PS 637: Mod: GY | Performed by: ORTHOPAEDIC SURGERY

## 2018-06-02 PROCEDURE — 25000132 ZZH RX MED GY IP 250 OP 250 PS 637: Mod: GY | Performed by: PHYSICIAN ASSISTANT

## 2018-06-02 PROCEDURE — 40000193 ZZH STATISTIC PT WARD VISIT: Performed by: PHYSICAL THERAPY ASSISTANT

## 2018-06-02 PROCEDURE — 25000128 H RX IP 250 OP 636: Performed by: HOSPITALIST

## 2018-06-02 PROCEDURE — 12000000 ZZH R&B MED SURG/OB

## 2018-06-02 PROCEDURE — 25000128 H RX IP 250 OP 636: Performed by: PHYSICIAN ASSISTANT

## 2018-06-02 PROCEDURE — 97530 THERAPEUTIC ACTIVITIES: CPT | Mod: GP | Performed by: PHYSICAL THERAPY ASSISTANT

## 2018-06-02 PROCEDURE — 82565 ASSAY OF CREATININE: CPT | Performed by: PHYSICIAN ASSISTANT

## 2018-06-02 RX ORDER — CIPROFLOXACIN 2 MG/ML
400 INJECTION, SOLUTION INTRAVENOUS EVERY 12 HOURS
Status: DISCONTINUED | OUTPATIENT
Start: 2018-06-02 | End: 2018-06-03

## 2018-06-02 RX ORDER — TRAMADOL HYDROCHLORIDE 50 MG/1
50 TABLET ORAL EVERY 6 HOURS PRN
Qty: 20 TABLET | Refills: 0 | Status: ON HOLD | OUTPATIENT
Start: 2018-06-02 | End: 2018-12-06

## 2018-06-02 RX ORDER — TRAMADOL HYDROCHLORIDE 50 MG/1
50 TABLET ORAL EVERY 6 HOURS PRN
Status: DISCONTINUED | OUTPATIENT
Start: 2018-06-02 | End: 2018-06-03 | Stop reason: HOSPADM

## 2018-06-02 RX ORDER — BISACODYL 10 MG
10 SUPPOSITORY, RECTAL RECTAL DAILY PRN
Status: DISCONTINUED | OUTPATIENT
Start: 2018-06-02 | End: 2018-06-03 | Stop reason: HOSPADM

## 2018-06-02 RX ADMIN — OMEPRAZOLE 20 MG: 20 CAPSULE, DELAYED RELEASE ORAL at 08:21

## 2018-06-02 RX ADMIN — ACETAMINOPHEN 975 MG: 325 TABLET, FILM COATED ORAL at 08:20

## 2018-06-02 RX ADMIN — METOPROLOL SUCCINATE 25 MG: 25 TABLET, EXTENDED RELEASE ORAL at 08:21

## 2018-06-02 RX ADMIN — HYDROMORPHONE HYDROCHLORIDE 0.5 MG: 1 INJECTION, SOLUTION INTRAMUSCULAR; INTRAVENOUS; SUBCUTANEOUS at 03:27

## 2018-06-02 RX ADMIN — LOSARTAN POTASSIUM 25 MG: 25 TABLET, FILM COATED ORAL at 08:21

## 2018-06-02 RX ADMIN — ACETAMINOPHEN 650 MG: 325 TABLET, FILM COATED ORAL at 18:01

## 2018-06-02 RX ADMIN — LOSARTAN POTASSIUM 25 MG: 25 TABLET, FILM COATED ORAL at 21:07

## 2018-06-02 RX ADMIN — CIPROFLOXACIN 400 MG: 2 INJECTION, SOLUTION INTRAVENOUS at 14:54

## 2018-06-02 RX ADMIN — SENNOSIDES AND DOCUSATE SODIUM 1 TABLET: 8.6; 5 TABLET ORAL at 21:06

## 2018-06-02 RX ADMIN — TRAMADOL HYDROCHLORIDE 50 MG: 50 TABLET, COATED ORAL at 21:07

## 2018-06-02 RX ADMIN — Medication 25 MG: at 08:21

## 2018-06-02 RX ADMIN — SENNOSIDES AND DOCUSATE SODIUM 2 TABLET: 8.6; 5 TABLET ORAL at 08:21

## 2018-06-02 ASSESSMENT — ACTIVITIES OF DAILY LIVING (ADL)
ADLS_ACUITY_SCORE: 11

## 2018-06-02 NOTE — PLAN OF CARE
Problem: Patient Care Overview  Goal: Plan of Care/Patient Progress Review  Outcome: No Change  A&Ox4, frustrated with urinary frequency, spasms, & retention. CMS intact w/ numbness bilateral big toes & generalized weakness. Bowel sounds +x4, +flatus, no BM. VSS on 1L O2, O2 D/C 0700. Dressing CDI. Up with A1, GB, W, & brace. C/o 6/10 bladder pain/spasms, decreased with PRN Dilaudid. Plan for UA this AM & possible discharge 6/3 to home with spouse.

## 2018-06-02 NOTE — PLAN OF CARE
Problem: Patient Care Overview  Goal: Plan of Care/Patient Progress Review  Outcome: Improving  Strength 5/5 all extremities (slightly weaker LLE, present pre-op), baseline numbness in bilateral big toes, not from present back issues.  VSS, denies pain, taking Tylenol only for discomfort. Showered with incision covered, sterile dressing change with 4x4's and paper tape.  Reports bladder spasms on night shift, UA sent but was not clean catch, additional UA sent-positive for UTI.  Hospitalist consult obtained and started on IV Cipro.  Plan for discharge to home tomorrow morning.    7549-7500: Redness, swelling, and heat noted at IV site during Cipro infusion.  MD notified, ice applied and elevated, swelling decreased, redness diminished.  BM this shift without suppository.

## 2018-06-02 NOTE — PROGRESS NOTES
[facility]    Spine Surgery  Daily Post-Op Note    Assessment & Plan   Procedure(s):  COMBINED DECOMPRESSION, FUSION LUMBAR POSTERIOR ONE LEVEL  GRAFT BONE FROM ILIAC CREST   -3 Days Post-Op      ASSESSMENT:  Improving, postop day #3, status post posterior spine fusion and decompression L4 to L5    PLAN:  Mobilized today, regular diet, shower, plan for discharge to home tomorrow  Orders for discharge written, plan for using tramadol and Tylenol for pain at home  Check UA today evaluate for UTI    Price Prajapati MD      Interval History   Doing well, had some problems with bladder spasms, Legs feel good, pain controlled with Tylenol.  One oxycodone yesterday evening    Physical Exam   Temp: 98.9  F (37.2  C) Temp src: Oral BP: 124/50   Heart Rate: 79 Resp: 12 SpO2: 95 % O2 Device: None (Room air) Oxygen Delivery: 2 LPM  Vitals:    05/30/18 0807   Weight: 90.3 kg (199 lb)     Vital Signs with Ranges  Temp:  [97.5  F (36.4  C)-99.5  F (37.5  C)] 98.9  F (37.2  C)  Heart Rate:  [73-87] 79  Resp:  [12-16] 12  BP: ()/(44-59) 124/50  SpO2:  [93 %-97 %] 95 %  I/O last 3 completed shifts:  In: 1083 [P.O.:1080; I.V.:3]  Out: 995 [Urine:975; Drains:20]    Alert, Oriented  Abd: S,NT,ND  Wound / Dressing: Clean, dry, and intactI removed the dressing today.  No swelling or erythema tolerating paper tape       Medications        acetaminophen  975 mg Oral Q8H     losartan (COZAAR) tablet 25 mg  25 mg Oral BID     metoprolol succinate (TOPROL-XL) 24 hr tablet 25 mg  25 mg Oral Daily     omeprazole (priLOSEC) CR capsule 20 mg  20 mg Oral Daily     senna-docusate  1 tablet Oral BID    Or     senna-docusate  2 tablet Oral BID     sodium chloride (PF)  3 mL Intracatheter Q8H     sodium phosphate  1 enema Rectal Once     spironolactone (ALDACTONE) half-tab 25 mg  25 mg Oral Daily       Data     Recent Labs  Lab 06/01/18  0830 05/31/18  0800   HGB 9.9* 10.3*       No results found for this or any previous visit  (from the past 24 hour(s)).

## 2018-06-02 NOTE — PLAN OF CARE
Problem: Patient Care Overview  Goal: Plan of Care/Patient Progress Review  Discharge Planner PT   Patient plan for discharge: home with A from spouse  Current status: Pt performed supine to sit transfer with min assist and sit to/from stand transfer with CGA.  Gait training x 550 ft using wheeled walker and CGA.  Slow pace.  Good stability.  Performed 3 stairs x 1 using bilateral rail and CGA.      PM PT-  Attempted x 2 this PM.  Pt was sleeping on 1st attempt and upon returning pt was fatigued and requested to rest and walk later with nursing staff.  PM PT cancelled.     Barriers to return to prior living situation: 3 stairs with 1 rail; will need bed rail for home  Recommendations for discharge: home with A from spouse per plan established by the PT.   Rationale for recommendations: cont therapies to progress strength and ease of movement to improve functional mob I and safety       Entered by: Zeinab Leon 06/02/2018 4:32 PM

## 2018-06-02 NOTE — PLAN OF CARE
Problem: Patient Care Overview  Goal: Plan of Care/Patient Progress Review  Discharge Planner OT   Patient plan for discharge: home w/spouse assist  Current status: Pt moving with SBA, Min A to get legs into bed ( to provide at home). Pt with good follow through of spinal precautions in functional mobility with walker use.   Barriers to return to prior living situation: none noted   Recommendations for discharge: home w/spouse assist  Rationale for recommendations: All goals met, no further need for skilled OT intervention.       Entered by: Ximena Diaz 06/02/2018 1:02 PM      Occupational Therapy Discharge Summary    Reason for therapy discharge:    All goals and outcomes met, no further needs identified.    Progress towards therapy goal(s). See goals on Care Plan in [facility] health record for goal details.  Goals met    Therapy recommendation(s):    No further therapy is recommended.

## 2018-06-02 NOTE — DISCHARGE SUMMARY
[facility]    Discharge Summary  Spine Surgery    Date of Admission:  5/30/2018  Date of Discharge:  6/3/18  Discharging Provider: Price Prajapati  Date of Service (when I saw the patient): 06/02/18    Primary Diagnosis This Admission:  Degenerative spondylolisthesis of L4 on L5 with severe central stenosis and bilateral leg radiculopathy.           Additional Diagnosis This Admission:    Principal Procedure This Admission:  1.  Posterior spinal fusion L4-5 with morcellized autologous bone graft.   2.  Bilateral L4-5 decompression.   3.  Posterior nonsegmental instrumentation with [employer] TSRH 3D instrumentation.   4.  [place] right posterior iliac crest bone graft.     Additional Procedures:    Hospital Course:  The patient underwent the above-noted procedure on the day of admission.  She tolerated procedure well.  There were no complications intraoperatively.  Gen. relatively low blood loss.  Postoperatively she had excellent pain control.  She mobilized relatively quickly.  Her preoperative leg pain and back pain was  resolved.    Her drains were removed on postoperative day #3.  Her incision is clean and dry.  There is no swelling.  She is voiding.  She will have her urine checked today for rule out UTI as she did have some bladder spasms in the evening.    Pain Medication on Discharge: Tylenol and she will take some tramadol if Tylenol is not sufficient.    Antibiotics prescribed at discharge: None prescribed     Price Prajapati    Discharge Disposition   Discharged to home   Condition at discharge: Stable    Primary Care Physician   Wilfred Medina    Consultations This Hospital Stay   ORTHOSIS SPINAL IP CONSULT  OCCUPATIONAL THERAPY ADULT IP CONSULT  PHYSICAL THERAPY ADULT IP CONSULT    Time Spent on this Encounter   I have spent less than 30 minutes on this discharge.    Discharge Orders   No discharge procedures on file.  Discharge Medications   Current Discharge Medication List       START taking these medications    Details   !! traMADol (ULTRAM) 50 MG tablet Take 1 tablet (50 mg) by mouth every 6 hours as needed for moderate pain  Qty: 20 tablet, Refills: 0    Associated Diagnoses: Status post arthrodesis       !! - Potential duplicate medications found. Please discuss with provider.      CONTINUE these medications which have NOT CHANGED    Details   Alpha-Lipoic Acid (LIPOIC ACID PO) Take 300 mg by mouth 2 times daily      Ascorbic Acid (VITAMIN C PO) Take 1,000 mg by mouth 3 times daily       B Complex-C (SUPER B COMPLEX PO) Take 1 tablet by mouth daily       Barberry-Oreg Grape-Goldenseal (BERBERINE COMPLEX PO) Take 500 mg by mouth 2 times daily       Cetirizine HCl (KLS ALLER-ISIDRA PO) Take 10 mg by mouth daily      Coenzyme Q10 (COQ-10 PO) Take 100 mg by mouth daily       FOLIC ACID PO Take 10 drops by mouth daily Super Liquid Folate      GLUCOSAMINE-CHONDROITIN PO Take 1 tablet by mouth daily       KRILL OIL PO Take 500 mg by mouth daily      LOSARTAN POTASSIUM PO Take 25 mg by mouth 2 times daily      MAGNESIUM PO Take 250 mg by mouth daily       MELATONIN PO Take 10 tablets by mouth daily       METOPROLOL SUCCINATE ER PO Take 25 mg by mouth daily (Takes 0.5 x 50mg tablet = 25mg dose)      minoxidil (ROGAINE) 2 % external solution Apply topically 2 times daily      Multiple Vitamins-Minerals (HAIR SKIN NAILS PO) Take 2 tablets by mouth daily      OMEPRAZOLE PO Take 20 mg by mouth daily      Probiotic Product (PROBIOTIC PO) Take 2 tablets by mouth daily       SPIRONOLACTONE PO Take 25 mg by mouth daily      !! TRAMADOL HCL PO Take 50 mg by mouth 3 times daily as needed       TURMERIC PO Take 500 mg by mouth 2 times daily       VITAMIN D-VITAMIN K PO Take 15 drops by mouth daily      VITAMIN E PO Take 400 Units by mouth daily        !! - Potential duplicate medications found. Please discuss with provider.        Allergies   Allergies   Allergen Reactions     Penicillins Anaphylaxis and  Difficulty breathing     Adhesive Tape Blisters     Hmg-Coa-R Inhibitors      Sulfa Drugs      Latex Rash     Data   Most Recent 3 CBC's:  Recent Labs   Lab Test  06/01/18   0830  05/31/18   0800   HGB  9.9*  10.3*

## 2018-06-02 NOTE — PROVIDER NOTIFICATION
"Call placed to Dr. Prajapati: \"UA was positive on patient, denies further spasms,  Would you like antibiotic ordered? Thanks!\"    Orders received for hospitalist consult.  Orders placed.  "

## 2018-06-03 ENCOUNTER — APPOINTMENT (OUTPATIENT)
Dept: PHYSICAL THERAPY | Facility: CLINIC | Age: 78
DRG: 460 | End: 2018-06-03
Attending: ORTHOPAEDIC SURGERY
Payer: MEDICARE

## 2018-06-03 VITALS
RESPIRATION RATE: 18 BRPM | TEMPERATURE: 98.3 F | OXYGEN SATURATION: 93 % | SYSTOLIC BLOOD PRESSURE: 154 MMHG | DIASTOLIC BLOOD PRESSURE: 71 MMHG | HEIGHT: 63 IN | WEIGHT: 199 LBS | BODY MASS INDEX: 35.26 KG/M2

## 2018-06-03 LAB
BACTERIA SPEC CULT: ABNORMAL
Lab: ABNORMAL
SPECIMEN SOURCE: ABNORMAL

## 2018-06-03 PROCEDURE — A9270 NON-COVERED ITEM OR SERVICE: HCPCS | Mod: GY | Performed by: ORTHOPAEDIC SURGERY

## 2018-06-03 PROCEDURE — A9270 NON-COVERED ITEM OR SERVICE: HCPCS | Mod: GY | Performed by: PHYSICIAN ASSISTANT

## 2018-06-03 PROCEDURE — 25000128 H RX IP 250 OP 636: Performed by: HOSPITALIST

## 2018-06-03 PROCEDURE — 25000132 ZZH RX MED GY IP 250 OP 250 PS 637: Mod: GY | Performed by: PHYSICIAN ASSISTANT

## 2018-06-03 PROCEDURE — 40000193 ZZH STATISTIC PT WARD VISIT: Performed by: PHYSICAL THERAPIST

## 2018-06-03 PROCEDURE — 97530 THERAPEUTIC ACTIVITIES: CPT | Mod: GP | Performed by: PHYSICAL THERAPIST

## 2018-06-03 PROCEDURE — A9270 NON-COVERED ITEM OR SERVICE: HCPCS | Mod: GY | Performed by: HOSPITALIST

## 2018-06-03 PROCEDURE — 99232 SBSQ HOSP IP/OBS MODERATE 35: CPT | Performed by: HOSPITALIST

## 2018-06-03 PROCEDURE — 25000132 ZZH RX MED GY IP 250 OP 250 PS 637: Mod: GY | Performed by: HOSPITALIST

## 2018-06-03 PROCEDURE — 97116 GAIT TRAINING THERAPY: CPT | Mod: GP | Performed by: PHYSICAL THERAPIST

## 2018-06-03 PROCEDURE — 25000132 ZZH RX MED GY IP 250 OP 250 PS 637: Mod: GY | Performed by: ORTHOPAEDIC SURGERY

## 2018-06-03 RX ORDER — CIPROFLOXACIN 500 MG/1
500 TABLET, FILM COATED ORAL EVERY 12 HOURS SCHEDULED
Status: DISCONTINUED | OUTPATIENT
Start: 2018-06-03 | End: 2018-06-03 | Stop reason: HOSPADM

## 2018-06-03 RX ORDER — CIPROFLOXACIN 500 MG/1
500 TABLET, FILM COATED ORAL 2 TIMES DAILY
Qty: 11 TABLET | Refills: 0 | Status: SHIPPED | OUTPATIENT
Start: 2018-06-03 | End: 2018-06-09

## 2018-06-03 RX ORDER — CIPROFLOXACIN 500 MG/1
500 TABLET, FILM COATED ORAL EVERY 12 HOURS
Qty: 12 TABLET | Refills: 0 | Status: SHIPPED | OUTPATIENT
Start: 2018-06-03 | End: 2018-06-03

## 2018-06-03 RX ADMIN — SENNOSIDES AND DOCUSATE SODIUM 1 TABLET: 8.6; 5 TABLET ORAL at 08:37

## 2018-06-03 RX ADMIN — LOSARTAN POTASSIUM 25 MG: 25 TABLET, FILM COATED ORAL at 08:37

## 2018-06-03 RX ADMIN — Medication 10 MG: at 01:39

## 2018-06-03 RX ADMIN — METOPROLOL SUCCINATE 25 MG: 25 TABLET, EXTENDED RELEASE ORAL at 08:37

## 2018-06-03 RX ADMIN — OXYCODONE HYDROCHLORIDE 10 MG: 5 TABLET ORAL at 10:46

## 2018-06-03 RX ADMIN — OXYCODONE HYDROCHLORIDE 10 MG: 5 TABLET ORAL at 06:57

## 2018-06-03 RX ADMIN — ACETAMINOPHEN 650 MG: 325 TABLET, FILM COATED ORAL at 01:39

## 2018-06-03 RX ADMIN — Medication 25 MG: at 08:37

## 2018-06-03 RX ADMIN — CIPROFLOXACIN 400 MG: 2 INJECTION, SOLUTION INTRAVENOUS at 01:18

## 2018-06-03 RX ADMIN — CIPROFLOXACIN HYDROCHLORIDE 500 MG: 500 TABLET, FILM COATED ORAL at 10:46

## 2018-06-03 RX ADMIN — OMEPRAZOLE 20 MG: 20 CAPSULE, DELAYED RELEASE ORAL at 08:37

## 2018-06-03 RX ADMIN — TRAMADOL HYDROCHLORIDE 50 MG: 50 TABLET, COATED ORAL at 03:45

## 2018-06-03 ASSESSMENT — ACTIVITIES OF DAILY LIVING (ADL)
ADLS_ACUITY_SCORE: 11

## 2018-06-03 NOTE — PROGRESS NOTES
[facility]    Hospitalist Progress Note    Date of Service (when I saw the patient): 06/03/2018    Assessment & Plan   Lisa Johnston is a 77 year old female who was admitted on 5/30/2018. I was asked to see the patient for urinary symptom and suspected UTI.     Active Problems:  - Degenerative spondylolisthesis L4-5 with severe spinal stenosis and bilateral leg radiculopathy- s/p L4-5 decompression bilateral, posterior spinal fusion with bone graft.  - E coli UTI.     Assessment:   -E coli UTI/cystitis Rehman catheter associated likely.  -IV site infiltration  -Hypertension, benign essential  -Asthma  -GERD  -Hyperlipidemia and h/o statin induced myopathy  -Constipation       Plan: Patient with urinary symptoms including increased frequency, blurry spasm, lower abdominal pain. UA was abnormal, culture was sent and started on IV cipro. Culture grew E coli, pending sensitivity at discharge, but later noted organism sensitive to cipro. Patient has penicillin allergy which does appear severe. Urinary symptoms resolved 24 hours after IV abx started. Afebrile. Rehman catheter is out and is voiding. Total 7 days of abx prescribed at discharge.  -Some of her lower abdominal pain could also be due to constipation. Had BM.   - If gets severe bladder spasm, trial of B and O suppository.  -PTA medications have been resumed for a stable medical problems including hypertension asthma and GERD.  BP normal.  No respiratory symptoms.  -Postop management per Ortho, appears stable. Discharging home today.   -bowel regimen.    # Pain Assessment:  Current Pain Score 6/3/2018   Patient currently in pain? yes   Pain score (0-10) 5   Pain location -   Pain descriptors -   Pain managed post op by primary service.    DVT Prophylaxis: Pneumatic Compression Devices and Defer to primary service  Code Status: Prior    Disposition: Expected discharge: today per primary.    Baldomero Mitchell MD    Interval History   Urinary  symptoms have markedly improved/nearly resolved. Urine culture grew GNR.  Afebrile  Post op pain well controlled, could not sleep well last night, wants to go home.  Discomfort at the IV site in her forearm due to IV infiltration while infusing IV abx.    -Data reviewed today: I reviewed all new labs and imaging results over the last 24 hours. I personally reviewed no images or EKG's today.    Physical Exam   Temp: 98.3  F (36.8  C) Temp src: Oral BP: 154/71   Heart Rate: 77 Resp: 16 SpO2: 93 % O2 Device: None (Room air)    Vitals:    05/30/18 0807   Weight: 90.3 kg (199 lb)     Vital Signs with Ranges  Temp:  [97.4  F (36.3  C)-98.4  F (36.9  C)] 98.3  F (36.8  C)  Heart Rate:  [75-87] 77  Resp:  [14-18] 16  BP: (103-154)/(51-71) 154/71  SpO2:  [93 %-97 %] 93 %  I/O last 3 completed shifts:  In: 255 [P.O.:250; I.V.:5]  Out: 2550 [Urine:2550]    Constitutional: Alert, awake. Pleasant, not in distress. Obese.  HEENT: PERRLA EOMI.  Respiratory: Bilateral equal air entry, clear to auscultation. No respiratory distress.  Cardiovascular: Regular s1s2, no murmur, rub or gallop. No tachycardia.  GI: Soft, non distended, non tender, bowel tones active.  Skin/Integumen: No rash, no blister.    Medications       ciprofloxacin  500 mg Oral Q12H FRANKLIN     losartan (COZAAR) tablet 25 mg  25 mg Oral BID     metoprolol succinate (TOPROL-XL) 24 hr tablet 25 mg  25 mg Oral Daily     omeprazole (priLOSEC) CR capsule 20 mg  20 mg Oral Daily     senna-docusate  1 tablet Oral BID    Or     senna-docusate  2 tablet Oral BID     sodium chloride (PF)  3 mL Intracatheter Q8H     sodium phosphate  1 enema Rectal Once     spironolactone (ALDACTONE) half-tab 25 mg  25 mg Oral Daily       Data     Recent Labs  Lab 06/02/18  0724 06/01/18  0830 05/31/18  0800 05/30/18  0750   HGB 9.5* 9.9* 10.3*  --    POTASSIUM  --   --   --  4.3   CR 0.90  --   --   --        No results found for this or any previous visit (from the past 24 hour(s)).

## 2018-06-03 NOTE — PLAN OF CARE
Problem: Patient Care Overview  Goal: Plan of Care/Patient Progress Review  Outcome: Improving  A&O. CMS intact, BL numbness to toes. Bowel sounds audible and active in all quadrants, passing gas. BM on day shift today. Voiding adequately in BR, continue IV antibiotics for UTI.VSS. Dressing CDI. Up with A1, GB/W. C/o 8/10 lower back pain, decreased with prn tramadol. Plan discharge to home tomorrow.

## 2018-06-03 NOTE — PLAN OF CARE
Problem: Patient Care Overview  Goal: Plan of Care/Patient Progress Review  Outcome: Adequate for Discharge Date Met: 06/03/18  Strength 5/5 all extremities, baseline numbness in big toes.  Reports increased pain this morning from arthritis in feet/knees/hands and lower back.  Anticipating discharge so she can restart home regimen of vitamins.  Taking Oxycodone 10mg, discussed at discharge to not take Tramadol until at least 1500.  Sterile dressing change with paper tape, erythema noted at middle of incision, scant drainage.  Right forearm hard and red at previous IV site, left forearm redness improved, no hardness noted.  Discussed effect of IV Cipro with pharmacist, reaction appears to be IV infiltrate not allergic reaction.  Dr. Mitchell informed as well.  Started on po Cipro prior to discharge, discharge instructions reviewed with patient.  Dr. Mitchell updated  via phone.

## 2018-06-03 NOTE — PLAN OF CARE
Problem: Patient Care Overview  Goal: Plan of Care/Patient Progress Review  Discharge Planner PT   Patient plan for discharge: Home  Current status: sup>sit supervision. Sit<>Stand independent. Ambulated 100' with use of WW, Mod I. Declines stair negotiation, has done it the last 2 days and feels confident. Follows spinal precautions with mobility. PT goals met, recommend ambulation with nursing staff. PT order completed at this time.  Barriers to return to prior living situation: decreased activity tolerance; needs a bed rail at home.  Recommendations for discharge: Home with assist from spouse for ADLs prn.  Rationale for recommendations: All PT goals met, pt moving well, demosntrates safety.        Entered by: Sharron Schumacher 06/03/2018 8:56 AM         Physical Therapy Discharge Summary    Reason for therapy discharge:    All goals and outcomes met, no further needs identified.    Progress towards therapy goal(s). See goals on Care Plan in [facility] electronic health record for goal details.  Goals met    Therapy recommendation(s):    Continue home exercise program.  Pt will benefit from continued frequent walks to reduce stiffness, improve circulation, strength and activity tolerance. Pt aware, states she can't wait to get home to be able to get up and walk when she feels like it. STates she feels better with lots of short walks. Rec nursing contiue to walk with pt during LOS.

## 2018-06-03 NOTE — PLAN OF CARE
Problem: Patient Care Overview  Goal: Plan of Care/Patient Progress Review  Outcome: Improving  A&Ox4. CMS intact w/ BL numbness bilateral big toes & generalized weakness. Bowel sounds +x4, +flatus,  +BM. Urinary urgency & frequency. IV Cipro administered at 1/2 ordered rate d/t discomfort at IV site, IV infiltrated s/p infusion, small reddened hard area, IV removed. VSS/ /64, SiO2 93% on rm air. Dressing CDI. Up with A1, GB, W, & brace. C/o 5/10 back pain decreased with PRN Tylenol, Tramadol, & aromatherapy. D/C home with spouse today.    Addendum: C/O 8/10 arthritic pain, PRN Oxycodone given.

## 2018-06-03 NOTE — CONSULTS
[facility]    Hospitalist Consultation    Date of Admission:  5/30/2018  Date of Consult (When I saw the patient): 06/02/18    Assessment & Plan   Lisa Johnston is a 77 year old female who was admitted on 5/30/2018. I was asked to see the patient for urinary symptom and suspected UTI.    Active Problems:  - Degenerative spondylolisthesis L4-5 with severe spinal stenosis and bilateral leg radiculopathy- s/p L4-5 decompression bilateral, posterior spinal fusion with bone graft.  - UTI.    Assessment:   -UTI/cystitis given Rehman catheter.  -Hypertension, benign essential  -Asthma  -GERD  -Hyperlipidemia and h/o statin induced myopathy  -Constipation      Plan: Patient with urinary symptoms including increased frequency, blurry spasm, lower abdominal pain.  UA was abnormal, culture was sent, patient has penicillin allergy which does appear severe, I have started her on Cipro.  Follow urine culture.   -Some of her lower abdominal pain could also be due to constipation.  Dulcolax rectal suppository ordered.  Has bowel regimen in place.   - If gets severe bladder spasm, trial of B and O suppository.  -PTA medications have been resumed for a stable medical problems including hypertension asthma and GERD.  BP normal.  No respiratory symptoms.  -Postop management per Ortho, appears stable.  -bowel regimen.    DVT Prophylaxis: Defer to primary service  Code Status: No Order    Disposition: Expected discharge: Per primary, likely tomorrow.. Plan of care discussed with patient and RN.    Thank you for the consultation, will continue to follow.    Baldomero Mitchell MD  Hospitalist    Reason for Consult   Reason for consult: urinary symptoms, possible UTI    Primary Care Physician   Wilfred Medina    Chief Complaint   Bladder spasm    History is obtained from the patient, RN and chart review.    History of Present Illness   Lisa Johnston is a 77 year old female, with past medical history significant for  hypertension, GERD, asthma, spondylolisthesis L4-5 with severe central stenosis and radiculopathy, who is postop day 3 after decompression and posterior spinal fusion L4-5, who is doing well postop.  Patient started having bladder spasm last night.  Also reports increased frequency.  Patient otherwise was afebrile.  UA was obtained and was abnormal indicating UTI.  Hospitalist service was consulted for evaluation.    Patient had Rehman catheter postop.  This was removed.  Daily 2.  She is afebrile.  She does have history of recurrent UTI in the past, she had bladder spasm once before when she had UTI.    Patient otherwise denies nausea, diarrhea, chest pain, dizziness, palpitation.  No hematuria was noted.    Past Medical History    I have reviewed this patient's medical history and updated it with pertinent information if needed.   Past Medical History:   Diagnosis Date     Gastro-oesophageal reflux disease      Hypertension      Obese      Renal disease      Uncomplicated asthma      Unspecified cerebral artery occlusion with cerebral infarction     2011       Past Surgical History   I have reviewed this patient's surgical history and updated it with pertinent information if needed.  Past Surgical History:   Procedure Laterality Date     ABDOMEN SURGERY      partial hysterectomy     APPENDECTOMY       COLONOSCOPY       CRYOTHERAPY  10/30/2013    Procedure: CRYOTHERAPY;;  Surgeon: Ramírez Warner MD;  Location:  [facility]     DECOMPRESSION, FUSION LUMBAR POSTERIOR ONE LEVEL, COMBINED Bilateral 5/30/2018    Procedure: COMBINED DECOMPRESSION, FUSION LUMBAR POSTERIOR ONE LEVEL;  BILATERAL L4-5 DECOMPRESSION, L4-5 POSTERIOR SPINAL FUSION WITH MEDTRONIC INSTRUMENTATION, RIGHT ILIAC CREST BONE GRAFT ;  Surgeon: Price Prajapati MD;  Location:  OR     GENITOURINARY SURGERY      vaginal repair     GRAFT BONE FROM ILIAC CREST Right 5/30/2018    Procedure: GRAFT BONE FROM ILIAC CREST;;  Surgeon: Price Prajapati MD;   Location:  OR     ORTHOPEDIC SURGERY      bunionectomy     REPAIR BLADDER       VITRECTOMY PARSPLANA WITH 23 GAUGE SYSTEM  10/30/2013    Procedure: VITRECTOMY PARSPLANA WITH 23 GAUGE SYSTEM;  RIGHT 23 GAUGE PARSPLANA VITRECTOMY, ENDOLASER, AIR FLUID EXCHANGE, INFUSION 30% SF6 GAS, CRYOTHERAPY;  Surgeon: Ramírez Warner MD;  Location: [facility]       Prior to Admission Medications   Prior to Admission Medications   Prescriptions Last Dose Informant Patient Reported? Taking?   Alpha-Lipoic Acid (LIPOIC ACID PO) 5/24/2018 Self Yes Yes   Sig: Take 300 mg by mouth 2 times daily   Ascorbic Acid (VITAMIN C PO) 5/24/2018 Self Yes Yes   Sig: Take 1,000 mg by mouth 3 times daily    B Complex-C (SUPER B COMPLEX PO) 5/24/2018 Self Yes Yes   Sig: Take 1 tablet by mouth daily    Barberry-Oreg Grape-Goldenseal (BERBERINE COMPLEX PO) 5/24/2018 Self Yes Yes   Sig: Take 500 mg by mouth 2 times daily    Cetirizine HCl (KLS ALLER-ISIDRA PO) 5/29/2018 at am Self Yes Yes   Sig: Take 10 mg by mouth daily   Coenzyme Q10 (COQ-10 PO) 5/24/2018 Self Yes Yes   Sig: Take 100 mg by mouth daily    FOLIC ACID PO 5/24/2018 Self Yes Yes   Sig: Take 10 drops by mouth daily Super Liquid Folate   GLUCOSAMINE-CHONDROITIN PO 5/24/2018 Self Yes Yes   Sig: Take 1 tablet by mouth daily    KRILL OIL PO more than a week Self Yes Yes   Sig: Take 500 mg by mouth daily   LOSARTAN POTASSIUM PO 5/30/2018 at 0500 Self Yes Yes   Sig: Take 25 mg by mouth 2 times daily   MAGNESIUM PO 5/24/2018 Self Yes Yes   Sig: Take 250 mg by mouth daily    MELATONIN PO 5/29/2018 at pm Self Yes Yes   Sig: Take 10 tablets by mouth daily    METOPROLOL SUCCINATE ER PO 5/30/2018 at 0500 Self Yes Yes   Sig: Take 25 mg by mouth daily (Takes 0.5 x 50mg tablet = 25mg dose)   Multiple Vitamins-Minerals (HAIR SKIN NAILS PO) 5/24/2018 Self Yes Yes   Sig: Take 2 tablets by mouth daily   OMEPRAZOLE PO 5/29/2018 at am Self Yes Yes   Sig: Take 20 mg by mouth daily   Probiotic Product (PROBIOTIC  PO) 5/24/2018 Self Yes Yes   Sig: Take 2 tablets by mouth daily    SPIRONOLACTONE PO 5/24/2018 Self Yes Yes   Sig: Take 25 mg by mouth daily   TRAMADOL HCL PO more than a week at prn Self Yes Yes   Sig: Take 50 mg by mouth 3 times daily as needed    TURMERIC PO 5/24/2018 Self Yes Yes   Sig: Take 500 mg by mouth 2 times daily    VITAMIN D-VITAMIN K PO 5/24/2018 Self Yes Yes   Sig: Take 15 drops by mouth daily   VITAMIN E PO 5/24/2018 Self Yes Yes   Sig: Take 400 Units by mouth daily    minoxidil (ROGAINE) 2 % external solution 5/24/2018 Self Yes Yes   Sig: Apply topically 2 times daily      Facility-Administered Medications: None     Allergies   Allergies   Allergen Reactions     Penicillins Anaphylaxis and Difficulty breathing     Adhesive Tape Blisters     Hmg-Coa-R Inhibitors      Sulfa Drugs      Latex Rash       Social History   I have reviewed this patient's social history and updated it with pertinent information if needed. Lisa Johnston  reports that she has never smoked. She has never used smokeless tobacco. She reports that she does not drink alcohol.    Family History   I have reviewed this patient's family history and updated it with pertinent information if needed.   History reviewed. No pertinent family history.    Review of Systems    HC stated above, patient complains of back pain which is well controlled, otherwise all 10 point systems review is negative other than mentioned in HPI.    Physical Exam   Temp: 98.2  F (36.8  C) Temp src: Oral BP: 136/64   Heart Rate: 78 Resp: 18 SpO2: 97 % O2 Device: None (Room air)    Vital Signs with Ranges  Temp:  [97.4  F (36.3  C)-98.9  F (37.2  C)] 98.2  F (36.8  C)  Heart Rate:  [75-83] 78  Resp:  [12-18] 18  BP: (103-136)/(50-64) 136/64  SpO2:  [94 %-97 %] 97 %  199 lbs 0 oz    Constitutional: Alert, awake, pleasant, does not seem to be in distress.  Eyes: PERRLA, EOMI  HEENT: PERRLA, EOMI.   Respiratory: Bilateral equal air entry, clear to auscultation, no  splinting. No respiratory distress.  Cardiovascular: S1S2 regular, No murmur, no tachycardia.  GI: Abdomen is soft, obese/distended, mild suprapubic tenderness, no guarding or rigidity.   Musculoskeletal: No edema  Neurologic: AAOx3, No gross focal neurological deficit.  Psychiatric: Calm, co-operative, appropriate.  Skin:  no rash    Data   -Data reviewed today: All pertinent laboratory and imaging results from this encounter were reviewed. I personally reviewed no images or EKG's today.    Recent Labs  Lab 06/02/18  0724 06/01/18  0830 05/31/18  0800 05/30/18  0750   HGB 9.5* 9.9* 10.3*  --    POTASSIUM  --   --   --  4.3   CR 0.90  --   --   --        No results found for this or any previous visit (from the past 24 hour(s)).

## 2018-06-03 NOTE — DISCHARGE INSTRUCTIONS
Care after a Spinal Fusion - Dr. Price Prajapati      The following information will help you through your recovery at home.    Pain    It is normal for you to experience pain after your surgery, most patients feel their pre-op pain has improved and they are left with a fair amount of surgical pain. This pain will improve a great deal over the 5-7 days you are in the hospital.      It is normal to have some ongoing pain when you get home from the hospital.  The pain should slowly improve over the next several weeks to months.     You should call Dr. Prajapati s office if you have a sudden onset of pain that does not improve over 24 hours.     Activity    You may increase your activity as tolerated, walking is the best form of exercise after spine surgery.      Avoid bending, lifting and twisting at the waist (BLT).  Avoid activities such as vacuuming, raking and shoveling.     You should wear your lumbar support when you are up out of bed. You should wear the brace for 6 weeks after your surgery.    Try to limit your lifting to 10lbs until you see Dr. Prajapati at your post-op appointment.    You should anticipate being out of work approximately 4-6 weeks after your surgery, some patients are able to return sooner and others need more time.  Dr. Prajapati and his staff will help you determine a return to work plan.      You may resume sexual activity 4-6 weeks after surgery.  Stop if you have pain.    Driving    You may drive if you feel strong enough and are not taking any narcotic pain medications.    Incision Site    When you leave the hospital keep your back incision covered for 10 days from your surgery date with daily dressing changes. If you have a stomach incision it can be left uncovered.    You may shower without covering your incision once you are home from the hospital, allow water and soap to run over your incision but do not scrub the area or soak in a tub.      Your incision is covered with steri-strips  (narrow white tapes); they will get loose and fall off in 7-10 days.  If they do not fall off after 10 days you may remove them or Dr. Alo villalobos staff will remove for you at your post-op visit.    Most patients have dissolvable stitches which do not need to be removed.  In some cases staples or nylon stitches are used and they need to be removed, 10-14 days after surgery. If you have staples or nylon sutures please call for a removal appointment with Dr. Alo villalobos nurse.    Pain Management     Take your prescribed pain medication as needed and directed.  Use Tylenol for your discomfort when you no longer need the narcotic pain medication.      DO NOT take Ibuprofen, Aleve, Motrin, Advil or any other anti-inflammatory medication.  They may affect the bone growth of your fusion.    If you need a refill on your pain medication call [phone], please allow 24 hour for your prescription to be refilled, Dr. Alo villalobos office does not refill pain medications on Friday.    Follow-up Visits     You should have a post-op appointment that was scheduled for you at the same time your surgery was scheduled. If you do not please call Dr. Alo villalobos office as soon as you get home.  Your appointment will be approximately one month after your surgery.    Write down any questions you have about your surgery, recovery, return to work and other topics you wished to be covered at your post-op visit.  This way, we will be able to address all of your questions at your next visit.    Call your doctor if you have any questions or concerns.    When to Call your Doctor    If you have any redness, warmth or swelling at the incision site.    If your incision opens up.    If you have increasing drainage from your incision.    If you have a temperature of more than 100.5 degrees Fahrenheit.    [address] [place] [id number]  Phone: [phone]  Fax: [phone]  Web site: [email or web address]        PUT ICE ON BOTH LOWER ARM IV SITES EVERY 4-6  HOURS, MONITOR FOR INCREASED REDNESS, SWELLING, OR PAIN.  CALL PRIMARY MD IF ANY ISSUES ARISE

## 2018-06-05 ASSESSMENT — ENCOUNTER SYMPTOMS: FREQUENCY: 1

## 2018-11-20 ENCOUNTER — TRANSFERRED RECORDS (OUTPATIENT)
Dept: HEALTH INFORMATION MANAGEMENT | Facility: CLINIC | Age: 78
End: 2018-11-20

## 2018-11-20 LAB — EJECTION FRACTION: 63

## 2018-11-30 ENCOUNTER — TRANSFERRED RECORDS (OUTPATIENT)
Dept: HEALTH INFORMATION MANAGEMENT | Facility: CLINIC | Age: 78
End: 2018-11-30

## 2018-11-30 LAB
CREAT SERPL-MCNC: 0.98 MG/DL (ref 0.51–0.95)
GFR SERPL CREATININE-BSD FRML MDRD: 55 ML/MIN/1.73M2 (ref 60–150)
GLUCOSE SERPL-MCNC: 136 MG/DL (ref 74–100)
POTASSIUM SERPL-SCNC: 4.5 MMOL/L (ref 3.5–5.1)

## 2018-12-03 ENCOUNTER — TRANSFERRED RECORDS (OUTPATIENT)
Dept: HEALTH INFORMATION MANAGEMENT | Facility: CLINIC | Age: 78
End: 2018-12-03

## 2018-12-04 ENCOUNTER — HOSPITAL ENCOUNTER (OUTPATIENT)
Dept: CT IMAGING | Facility: CLINIC | Age: 78
Discharge: HOME | DRG: 483 | End: 2018-12-04
Attending: ORTHOPAEDIC SURGERY | Admitting: ORTHOPAEDIC SURGERY
Payer: MEDICARE

## 2018-12-04 DIAGNOSIS — S42.209A PROXIMAL HUMERUS FRACTURE: ICD-10-CM

## 2018-12-04 PROCEDURE — 73200 CT UPPER EXTREMITY W/O DYE: CPT | Mod: RT

## 2018-12-05 ENCOUNTER — ANESTHESIA (OUTPATIENT)
Dept: SURGERY | Facility: CLINIC | Age: 78
DRG: 483 | End: 2018-12-05
Payer: MEDICARE

## 2018-12-05 ENCOUNTER — APPOINTMENT (OUTPATIENT)
Dept: GENERAL RADIOLOGY | Facility: CLINIC | Age: 78
DRG: 483 | End: 2018-12-05
Attending: ORTHOPAEDIC SURGERY
Payer: MEDICARE

## 2018-12-05 ENCOUNTER — HOSPITAL ENCOUNTER (INPATIENT)
Facility: CLINIC | Age: 78
LOS: 2 days | Discharge: HOME | DRG: 483 | End: 2018-12-07
Attending: ORTHOPAEDIC SURGERY | Admitting: ORTHOPAEDIC SURGERY
Payer: MEDICARE

## 2018-12-05 ENCOUNTER — ANESTHESIA EVENT (OUTPATIENT)
Dept: SURGERY | Facility: CLINIC | Age: 78
DRG: 483 | End: 2018-12-05
Payer: MEDICARE

## 2018-12-05 DIAGNOSIS — M62.838 SPASM OF MUSCLE: ICD-10-CM

## 2018-12-05 DIAGNOSIS — Z96.611 S/P REVERSE TOTAL SHOULDER ARTHROPLASTY, RIGHT: Primary | ICD-10-CM

## 2018-12-05 DIAGNOSIS — I10 BENIGN ESSENTIAL HYPERTENSION: ICD-10-CM

## 2018-12-05 LAB
CREAT SERPL-MCNC: 0.99 MG/DL (ref 0.52–1.04)
GFR SERPL CREATININE-BSD FRML MDRD: 54 ML/MIN/1.7M2
GRAM STN SPEC: NORMAL
GRAM STN SPEC: NORMAL
Lab: NORMAL
PLATELET # BLD AUTO: 340 10E9/L (ref 150–450)
SPECIMEN SOURCE: NORMAL

## 2018-12-05 PROCEDURE — A9270 NON-COVERED ITEM OR SERVICE: HCPCS | Mod: GY | Performed by: INTERNAL MEDICINE

## 2018-12-05 PROCEDURE — 25000132 ZZH RX MED GY IP 250 OP 250 PS 637: Mod: GY | Performed by: INTERNAL MEDICINE

## 2018-12-05 PROCEDURE — 25000125 ZZHC RX 250: Performed by: NURSE ANESTHETIST, CERTIFIED REGISTERED

## 2018-12-05 PROCEDURE — 25000128 H RX IP 250 OP 636: Performed by: ORTHOPAEDIC SURGERY

## 2018-12-05 PROCEDURE — 12000007 ZZH R&B INTERMEDIATE

## 2018-12-05 PROCEDURE — 0RRJ00Z REPLACEMENT OF RIGHT SHOULDER JOINT WITH REVERSE BALL AND SOCKET SYNTHETIC SUBSTITUTE, OPEN APPROACH: ICD-10-PCS | Performed by: ORTHOPAEDIC SURGERY

## 2018-12-05 PROCEDURE — A9270 NON-COVERED ITEM OR SERVICE: HCPCS | Mod: GY | Performed by: ORTHOPAEDIC SURGERY

## 2018-12-05 PROCEDURE — 87070 CULTURE OTHR SPECIMN AEROBIC: CPT | Performed by: ORTHOPAEDIC SURGERY

## 2018-12-05 PROCEDURE — 25000132 ZZH RX MED GY IP 250 OP 250 PS 637: Mod: GY | Performed by: ORTHOPAEDIC SURGERY

## 2018-12-05 PROCEDURE — 40000986 XR SHOULDER RT PORT G/E 2 VW: Mod: RT

## 2018-12-05 PROCEDURE — 87205 SMEAR GRAM STAIN: CPT | Performed by: ORTHOPAEDIC SURGERY

## 2018-12-05 PROCEDURE — 25000125 ZZHC RX 250: Performed by: ORTHOPAEDIC SURGERY

## 2018-12-05 PROCEDURE — 25000128 H RX IP 250 OP 636: Performed by: NURSE ANESTHETIST, CERTIFIED REGISTERED

## 2018-12-05 PROCEDURE — 37000008 ZZH ANESTHESIA TECHNICAL FEE, 1ST 30 MIN: Performed by: ORTHOPAEDIC SURGERY

## 2018-12-05 PROCEDURE — 27110028 ZZH OR GENERAL SUPPLY NON-STERILE: Performed by: ORTHOPAEDIC SURGERY

## 2018-12-05 PROCEDURE — 99207 ZZC CONSULT E&M CHANGED TO INITIAL LEVEL: CPT | Performed by: INTERNAL MEDICINE

## 2018-12-05 PROCEDURE — 36415 COLL VENOUS BLD VENIPUNCTURE: CPT | Performed by: ORTHOPAEDIC SURGERY

## 2018-12-05 PROCEDURE — 40000305 ZZH STATISTIC PRE PROC ASSESS I: Performed by: ORTHOPAEDIC SURGERY

## 2018-12-05 PROCEDURE — 82565 ASSAY OF CREATININE: CPT | Performed by: ORTHOPAEDIC SURGERY

## 2018-12-05 PROCEDURE — C1776 JOINT DEVICE (IMPLANTABLE): HCPCS | Performed by: ORTHOPAEDIC SURGERY

## 2018-12-05 PROCEDURE — 27210794 ZZH OR GENERAL SUPPLY STERILE: Performed by: ORTHOPAEDIC SURGERY

## 2018-12-05 PROCEDURE — 0LS30ZZ REPOSITION RIGHT UPPER ARM TENDON, OPEN APPROACH: ICD-10-PCS | Performed by: ORTHOPAEDIC SURGERY

## 2018-12-05 PROCEDURE — 25000566 ZZH SEVOFLURANE, EA 15 MIN: Performed by: ORTHOPAEDIC SURGERY

## 2018-12-05 PROCEDURE — 25000128 H RX IP 250 OP 636: Performed by: ANESTHESIOLOGY

## 2018-12-05 PROCEDURE — 71000013 ZZH RECOVERY PHASE 1 LEVEL 1 EA ADDTL HR: Performed by: ORTHOPAEDIC SURGERY

## 2018-12-05 PROCEDURE — 93010 ELECTROCARDIOGRAM REPORT: CPT | Performed by: INTERNAL MEDICINE

## 2018-12-05 PROCEDURE — 37000009 ZZH ANESTHESIA TECHNICAL FEE, EACH ADDTL 15 MIN: Performed by: ORTHOPAEDIC SURGERY

## 2018-12-05 PROCEDURE — 40000277 XR SURGERY CARM FLUORO LESS THAN 5 MIN W STILLS: Mod: TC

## 2018-12-05 PROCEDURE — L3670 SO ACRO/CLAV CAN WEB PRE OTS: HCPCS

## 2018-12-05 PROCEDURE — 36000063 ZZH SURGERY LEVEL 4 EA 15 ADDTL MIN: Performed by: ORTHOPAEDIC SURGERY

## 2018-12-05 PROCEDURE — 36000093 ZZH SURGERY LEVEL 4 1ST 30 MIN: Performed by: ORTHOPAEDIC SURGERY

## 2018-12-05 PROCEDURE — C1713 ANCHOR/SCREW BN/BN,TIS/BN: HCPCS | Performed by: ORTHOPAEDIC SURGERY

## 2018-12-05 PROCEDURE — 99221 1ST HOSP IP/OBS SF/LOW 40: CPT | Performed by: INTERNAL MEDICINE

## 2018-12-05 PROCEDURE — 25000128 H RX IP 250 OP 636

## 2018-12-05 PROCEDURE — 85049 AUTOMATED PLATELET COUNT: CPT | Performed by: ORTHOPAEDIC SURGERY

## 2018-12-05 PROCEDURE — 71000012 ZZH RECOVERY PHASE 1 LEVEL 1 FIRST HR: Performed by: ORTHOPAEDIC SURGERY

## 2018-12-05 PROCEDURE — 87075 CULTR BACTERIA EXCEPT BLOOD: CPT | Performed by: ORTHOPAEDIC SURGERY

## 2018-12-05 PROCEDURE — 25000125 ZZHC RX 250: Performed by: INTERNAL MEDICINE

## 2018-12-05 DEVICE — IMPLANTABLE DEVICE: Type: IMPLANTABLE DEVICE | Site: SHOULDER | Status: FUNCTIONAL

## 2018-12-05 RX ORDER — ALBUTEROL SULFATE 0.83 MG/ML
2.5 SOLUTION RESPIRATORY (INHALATION) EVERY 4 HOURS PRN
Status: DISCONTINUED | OUTPATIENT
Start: 2018-12-05 | End: 2018-12-05 | Stop reason: HOSPADM

## 2018-12-05 RX ORDER — METOPROLOL SUCCINATE 25 MG/1
25 TABLET, EXTENDED RELEASE ORAL DAILY
Status: DISCONTINUED | OUTPATIENT
Start: 2018-12-06 | End: 2018-12-07 | Stop reason: HOSPADM

## 2018-12-05 RX ORDER — SODIUM CHLORIDE, SODIUM LACTATE, POTASSIUM CHLORIDE, CALCIUM CHLORIDE 600; 310; 30; 20 MG/100ML; MG/100ML; MG/100ML; MG/100ML
INJECTION, SOLUTION INTRAVENOUS CONTINUOUS
Status: DISCONTINUED | OUTPATIENT
Start: 2018-12-05 | End: 2018-12-07 | Stop reason: HOSPADM

## 2018-12-05 RX ORDER — ONDANSETRON 4 MG/1
4 TABLET, ORALLY DISINTEGRATING ORAL EVERY 30 MIN PRN
Status: DISCONTINUED | OUTPATIENT
Start: 2018-12-05 | End: 2018-12-05 | Stop reason: HOSPADM

## 2018-12-05 RX ORDER — CLINDAMYCIN PHOSPHATE 900 MG/50ML
900 INJECTION, SOLUTION INTRAVENOUS
Status: DISCONTINUED | OUTPATIENT
Start: 2018-12-05 | End: 2018-12-05 | Stop reason: HOSPADM

## 2018-12-05 RX ORDER — ACETAMINOPHEN 325 MG/1
975 TABLET ORAL EVERY 8 HOURS
Status: DISCONTINUED | OUTPATIENT
Start: 2018-12-05 | End: 2018-12-07 | Stop reason: HOSPADM

## 2018-12-05 RX ORDER — LOSARTAN POTASSIUM 25 MG/1
25 TABLET ORAL 2 TIMES DAILY
Status: DISCONTINUED | OUTPATIENT
Start: 2018-12-05 | End: 2018-12-07 | Stop reason: HOSPADM

## 2018-12-05 RX ORDER — SPIRONOLACTONE 25 MG/1
25 TABLET ORAL DAILY
Status: DISCONTINUED | OUTPATIENT
Start: 2018-12-05 | End: 2018-12-07 | Stop reason: HOSPADM

## 2018-12-05 RX ORDER — AMOXICILLIN 250 MG
1 CAPSULE ORAL 2 TIMES DAILY
Status: DISCONTINUED | OUTPATIENT
Start: 2018-12-05 | End: 2018-12-07 | Stop reason: HOSPADM

## 2018-12-05 RX ORDER — GLYCOPYRROLATE 0.2 MG/ML
INJECTION, SOLUTION INTRAMUSCULAR; INTRAVENOUS PRN
Status: DISCONTINUED | OUTPATIENT
Start: 2018-12-05 | End: 2018-12-05

## 2018-12-05 RX ORDER — HYDROMORPHONE HYDROCHLORIDE 1 MG/ML
.3-.5 INJECTION, SOLUTION INTRAMUSCULAR; INTRAVENOUS; SUBCUTANEOUS
Status: DISCONTINUED | OUTPATIENT
Start: 2018-12-05 | End: 2018-12-06

## 2018-12-05 RX ORDER — CETIRIZINE HYDROCHLORIDE 5 MG/1
5 TABLET ORAL DAILY
Status: DISCONTINUED | OUTPATIENT
Start: 2018-12-05 | End: 2018-12-07 | Stop reason: HOSPADM

## 2018-12-05 RX ORDER — FENTANYL CITRATE 50 UG/ML
INJECTION, SOLUTION INTRAMUSCULAR; INTRAVENOUS PRN
Status: DISCONTINUED | OUTPATIENT
Start: 2018-12-05 | End: 2018-12-05

## 2018-12-05 RX ORDER — LIDOCAINE HYDROCHLORIDE 10 MG/ML
INJECTION, SOLUTION INFILTRATION; PERINEURAL PRN
Status: DISCONTINUED | OUTPATIENT
Start: 2018-12-05 | End: 2018-12-05

## 2018-12-05 RX ORDER — OXYCODONE HYDROCHLORIDE 5 MG/1
5-10 TABLET ORAL EVERY 4 HOURS PRN
Status: DISCONTINUED | OUTPATIENT
Start: 2018-12-05 | End: 2018-12-06

## 2018-12-05 RX ORDER — PROPOFOL 10 MG/ML
INJECTION, EMULSION INTRAVENOUS PRN
Status: DISCONTINUED | OUTPATIENT
Start: 2018-12-05 | End: 2018-12-05

## 2018-12-05 RX ORDER — LIDOCAINE 40 MG/G
CREAM TOPICAL
Status: DISCONTINUED | OUTPATIENT
Start: 2018-12-05 | End: 2018-12-05 | Stop reason: HOSPADM

## 2018-12-05 RX ORDER — CLINDAMYCIN PHOSPHATE 900 MG/50ML
900 INJECTION, SOLUTION INTRAVENOUS EVERY 8 HOURS
Status: COMPLETED | OUTPATIENT
Start: 2018-12-05 | End: 2018-12-06

## 2018-12-05 RX ORDER — PROCHLORPERAZINE MALEATE 5 MG
5 TABLET ORAL EVERY 6 HOURS PRN
Status: DISCONTINUED | OUTPATIENT
Start: 2018-12-05 | End: 2018-12-07 | Stop reason: HOSPADM

## 2018-12-05 RX ORDER — SODIUM CHLORIDE, SODIUM LACTATE, POTASSIUM CHLORIDE, CALCIUM CHLORIDE 600; 310; 30; 20 MG/100ML; MG/100ML; MG/100ML; MG/100ML
INJECTION, SOLUTION INTRAVENOUS CONTINUOUS
Status: DISCONTINUED | OUTPATIENT
Start: 2018-12-05 | End: 2018-12-05 | Stop reason: HOSPADM

## 2018-12-05 RX ORDER — HYDROMORPHONE HYDROCHLORIDE 1 MG/ML
.3-.5 INJECTION, SOLUTION INTRAMUSCULAR; INTRAVENOUS; SUBCUTANEOUS EVERY 5 MIN PRN
Status: DISCONTINUED | OUTPATIENT
Start: 2018-12-05 | End: 2018-12-05 | Stop reason: HOSPADM

## 2018-12-05 RX ORDER — CLINDAMYCIN PHOSPHATE 900 MG/50ML
900 INJECTION, SOLUTION INTRAVENOUS SEE ADMIN INSTRUCTIONS
Status: DISCONTINUED | OUTPATIENT
Start: 2018-12-05 | End: 2018-12-05 | Stop reason: HOSPADM

## 2018-12-05 RX ORDER — ACETAMINOPHEN 325 MG/1
650 TABLET ORAL EVERY 4 HOURS PRN
Status: DISCONTINUED | OUTPATIENT
Start: 2018-12-08 | End: 2018-12-07 | Stop reason: HOSPADM

## 2018-12-05 RX ORDER — BUPIVACAINE HYDROCHLORIDE AND EPINEPHRINE 5; 5 MG/ML; UG/ML
INJECTION, SOLUTION PERINEURAL PRN
Status: DISCONTINUED | OUTPATIENT
Start: 2018-12-05 | End: 2018-12-05 | Stop reason: HOSPADM

## 2018-12-05 RX ORDER — ONDANSETRON 2 MG/ML
4 INJECTION INTRAMUSCULAR; INTRAVENOUS EVERY 30 MIN PRN
Status: DISCONTINUED | OUTPATIENT
Start: 2018-12-05 | End: 2018-12-05 | Stop reason: HOSPADM

## 2018-12-05 RX ORDER — BUPIVACAINE HYDROCHLORIDE 5 MG/ML
INJECTION, SOLUTION EPIDURAL; INTRACAUDAL PRN
Status: DISCONTINUED | OUTPATIENT
Start: 2018-12-05 | End: 2018-12-05

## 2018-12-05 RX ORDER — TRAMADOL HYDROCHLORIDE 50 MG/1
50 TABLET ORAL EVERY 6 HOURS PRN
Status: DISCONTINUED | OUTPATIENT
Start: 2018-12-05 | End: 2018-12-07 | Stop reason: HOSPADM

## 2018-12-05 RX ORDER — PREDNISONE 5 MG/1
10 TABLET ORAL DAILY
Status: DISCONTINUED | OUTPATIENT
Start: 2018-12-05 | End: 2018-12-07 | Stop reason: HOSPADM

## 2018-12-05 RX ORDER — ONDANSETRON 4 MG/1
4 TABLET, ORALLY DISINTEGRATING ORAL EVERY 6 HOURS PRN
Status: DISCONTINUED | OUTPATIENT
Start: 2018-12-05 | End: 2018-12-07 | Stop reason: HOSPADM

## 2018-12-05 RX ORDER — ONDANSETRON 2 MG/ML
4 INJECTION INTRAMUSCULAR; INTRAVENOUS EVERY 6 HOURS PRN
Status: DISCONTINUED | OUTPATIENT
Start: 2018-12-05 | End: 2018-12-07 | Stop reason: HOSPADM

## 2018-12-05 RX ORDER — NALOXONE HYDROCHLORIDE 0.4 MG/ML
.1-.4 INJECTION, SOLUTION INTRAMUSCULAR; INTRAVENOUS; SUBCUTANEOUS
Status: ACTIVE | OUTPATIENT
Start: 2018-12-05 | End: 2018-12-06

## 2018-12-05 RX ORDER — OXYCODONE HCL 10 MG/1
10 TABLET, FILM COATED, EXTENDED RELEASE ORAL EVERY 12 HOURS
Status: DISCONTINUED | OUTPATIENT
Start: 2018-12-05 | End: 2018-12-06

## 2018-12-05 RX ORDER — NEOSTIGMINE METHYLSULFATE 1 MG/ML
VIAL (ML) INJECTION PRN
Status: DISCONTINUED | OUTPATIENT
Start: 2018-12-05 | End: 2018-12-05

## 2018-12-05 RX ORDER — LIDOCAINE 40 MG/G
CREAM TOPICAL
Status: DISCONTINUED | OUTPATIENT
Start: 2018-12-05 | End: 2018-12-07 | Stop reason: HOSPADM

## 2018-12-05 RX ORDER — NALOXONE HYDROCHLORIDE 0.4 MG/ML
.1-.4 INJECTION, SOLUTION INTRAMUSCULAR; INTRAVENOUS; SUBCUTANEOUS
Status: DISCONTINUED | OUTPATIENT
Start: 2018-12-05 | End: 2018-12-05 | Stop reason: ALTCHOICE

## 2018-12-05 RX ORDER — GABAPENTIN 100 MG/1
100 CAPSULE ORAL 3 TIMES DAILY
Status: DISCONTINUED | OUTPATIENT
Start: 2018-12-05 | End: 2018-12-07 | Stop reason: HOSPADM

## 2018-12-05 RX ORDER — ONDANSETRON 2 MG/ML
INJECTION INTRAMUSCULAR; INTRAVENOUS PRN
Status: DISCONTINUED | OUTPATIENT
Start: 2018-12-05 | End: 2018-12-05

## 2018-12-05 RX ORDER — AMOXICILLIN 250 MG
2 CAPSULE ORAL 2 TIMES DAILY
Status: DISCONTINUED | OUTPATIENT
Start: 2018-12-05 | End: 2018-12-07 | Stop reason: HOSPADM

## 2018-12-05 RX ORDER — DEXAMETHASONE SODIUM PHOSPHATE 4 MG/ML
INJECTION, SOLUTION INTRA-ARTICULAR; INTRALESIONAL; INTRAMUSCULAR; INTRAVENOUS; SOFT TISSUE PRN
Status: DISCONTINUED | OUTPATIENT
Start: 2018-12-05 | End: 2018-12-05

## 2018-12-05 RX ORDER — FENTANYL CITRATE 50 UG/ML
25-50 INJECTION, SOLUTION INTRAMUSCULAR; INTRAVENOUS
Status: DISCONTINUED | OUTPATIENT
Start: 2018-12-05 | End: 2018-12-05 | Stop reason: HOSPADM

## 2018-12-05 RX ORDER — HYDRALAZINE HYDROCHLORIDE 20 MG/ML
2.5-5 INJECTION INTRAMUSCULAR; INTRAVENOUS EVERY 10 MIN PRN
Status: DISCONTINUED | OUTPATIENT
Start: 2018-12-05 | End: 2018-12-05 | Stop reason: HOSPADM

## 2018-12-05 RX ORDER — HYDROMORPHONE HYDROCHLORIDE 1 MG/ML
INJECTION, SOLUTION INTRAMUSCULAR; INTRAVENOUS; SUBCUTANEOUS
Status: COMPLETED
Start: 2018-12-05 | End: 2018-12-05

## 2018-12-05 RX ADMIN — GLYCOPYRROLATE 0.4 MG: 0.2 INJECTION, SOLUTION INTRAMUSCULAR; INTRAVENOUS at 11:16

## 2018-12-05 RX ADMIN — OXYCODONE HYDROCHLORIDE 5 MG: 5 TABLET ORAL at 20:21

## 2018-12-05 RX ADMIN — MELATONIN TAB 3 MG 10 MG: 3 TAB at 21:35

## 2018-12-05 RX ADMIN — BUPIVACAINE HYDROCHLORIDE 30 ML: 5 INJECTION, SOLUTION EPIDURAL; INTRACAUDAL at 07:12

## 2018-12-05 RX ADMIN — LIDOCAINE HYDROCHLORIDE 50 MG: 10 INJECTION, SOLUTION INFILTRATION; PERINEURAL at 07:41

## 2018-12-05 RX ADMIN — ROCURONIUM BROMIDE 10 MG: 10 INJECTION INTRAVENOUS at 10:47

## 2018-12-05 RX ADMIN — CETIRIZINE HYDROCHLORIDE 5 MG: 5 TABLET ORAL at 17:41

## 2018-12-05 RX ADMIN — HYDROMORPHONE HYDROCHLORIDE 0.5 MG: 1 INJECTION, SOLUTION INTRAMUSCULAR; INTRAVENOUS; SUBCUTANEOUS at 12:22

## 2018-12-05 RX ADMIN — Medication 0.3 MG: at 14:30

## 2018-12-05 RX ADMIN — PREDNISONE 10 MG: 5 TABLET ORAL at 17:41

## 2018-12-05 RX ADMIN — SODIUM CHLORIDE, POTASSIUM CHLORIDE, SODIUM LACTATE AND CALCIUM CHLORIDE: 600; 310; 30; 20 INJECTION, SOLUTION INTRAVENOUS at 14:38

## 2018-12-05 RX ADMIN — GABAPENTIN 100 MG: 100 CAPSULE ORAL at 19:58

## 2018-12-05 RX ADMIN — PHENYLEPHRINE HYDROCHLORIDE 100 MCG: 10 INJECTION, SOLUTION INTRAMUSCULAR; INTRAVENOUS; SUBCUTANEOUS at 09:45

## 2018-12-05 RX ADMIN — LOSARTAN POTASSIUM 25 MG: 25 TABLET ORAL at 19:57

## 2018-12-05 RX ADMIN — SODIUM CHLORIDE, POTASSIUM CHLORIDE, SODIUM LACTATE AND CALCIUM CHLORIDE: 600; 310; 30; 20 INJECTION, SOLUTION INTRAVENOUS at 09:45

## 2018-12-05 RX ADMIN — ROCURONIUM BROMIDE 10 MG: 10 INJECTION INTRAVENOUS at 09:30

## 2018-12-05 RX ADMIN — CLINDAMYCIN PHOSPHATE 900 MG: 900 INJECTION, SOLUTION INTRAVENOUS at 16:48

## 2018-12-05 RX ADMIN — GABAPENTIN 100 MG: 100 CAPSULE ORAL at 16:14

## 2018-12-05 RX ADMIN — FENTANYL CITRATE 50 MCG: 50 INJECTION, SOLUTION INTRAMUSCULAR; INTRAVENOUS at 10:45

## 2018-12-05 RX ADMIN — FENTANYL CITRATE 50 MCG: 50 INJECTION INTRAMUSCULAR; INTRAVENOUS at 11:41

## 2018-12-05 RX ADMIN — FENTANYL CITRATE 50 MCG: 50 INJECTION INTRAMUSCULAR; INTRAVENOUS at 11:46

## 2018-12-05 RX ADMIN — Medication 1 G: at 11:12

## 2018-12-05 RX ADMIN — SODIUM CHLORIDE, POTASSIUM CHLORIDE, SODIUM LACTATE AND CALCIUM CHLORIDE: 600; 310; 30; 20 INJECTION, SOLUTION INTRAVENOUS at 07:41

## 2018-12-05 RX ADMIN — TRAMADOL HYDROCHLORIDE 50 MG: 50 TABLET, COATED ORAL at 23:18

## 2018-12-05 RX ADMIN — OXYCODONE HYDROCHLORIDE 10 MG: 10 TABLET, FILM COATED, EXTENDED RELEASE ORAL at 16:14

## 2018-12-05 RX ADMIN — PHENYLEPHRINE HYDROCHLORIDE 150 MCG: 10 INJECTION, SOLUTION INTRAMUSCULAR; INTRAVENOUS; SUBCUTANEOUS at 11:08

## 2018-12-05 RX ADMIN — PROPOFOL 130 MG: 10 INJECTION, EMULSION INTRAVENOUS at 07:42

## 2018-12-05 RX ADMIN — ONDANSETRON 4 MG: 2 INJECTION INTRAMUSCULAR; INTRAVENOUS at 10:15

## 2018-12-05 RX ADMIN — Medication 3 MG: at 11:16

## 2018-12-05 RX ADMIN — SENNOSIDES AND DOCUSATE SODIUM 1 TABLET: 8.6; 5 TABLET ORAL at 19:58

## 2018-12-05 RX ADMIN — ROCURONIUM BROMIDE 50 MG: 10 INJECTION INTRAVENOUS at 07:43

## 2018-12-05 RX ADMIN — CLINDAMYCIN PHOSPHATE 900 MG: 18 INJECTION, SOLUTION INTRAVENOUS at 07:41

## 2018-12-05 RX ADMIN — DEXAMETHASONE SODIUM PHOSPHATE 4 MG: 4 INJECTION, SOLUTION INTRA-ARTICULAR; INTRALESIONAL; INTRAMUSCULAR; INTRAVENOUS; SOFT TISSUE at 07:43

## 2018-12-05 RX ADMIN — SPIRONOLACTONE 25 MG: 25 TABLET ORAL at 16:14

## 2018-12-05 RX ADMIN — FENTANYL CITRATE 150 MCG: 50 INJECTION, SOLUTION INTRAMUSCULAR; INTRAVENOUS at 07:41

## 2018-12-05 RX ADMIN — Medication 1 G: at 07:55

## 2018-12-05 RX ADMIN — FENTANYL CITRATE 100 MCG: 50 INJECTION, SOLUTION INTRAMUSCULAR; INTRAVENOUS at 08:15

## 2018-12-05 RX ADMIN — HYDROMORPHONE HYDROCHLORIDE 0.5 MG: 1 INJECTION, SOLUTION INTRAMUSCULAR; INTRAVENOUS; SUBCUTANEOUS at 12:54

## 2018-12-05 RX ADMIN — ACETAMINOPHEN 975 MG: 325 TABLET, FILM COATED ORAL at 16:14

## 2018-12-05 ASSESSMENT — ACTIVITIES OF DAILY LIVING (ADL)
ADLS_ACUITY_SCORE: 19
ADLS_ACUITY_SCORE: 19

## 2018-12-05 NOTE — ANESTHESIA PROCEDURE NOTES
Peripheral nerve/Neuraxial procedure note : Brachial plexus and Interscalene  Pre-Procedure  Performed by JAZMIN SANCHEZ  Location: pre-op    Procedure Times:12/5/2018 7:01 AM and 12/5/2018 7:12 AM  Pre-Anesthestic Checklist: patient identified, IV checked, site marked, risks and benefits discussed, informed consent, monitors and equipment checked, pre-op evaluation, at physician/surgeon's request and post-op pain management    Timeout  Correct Patient: Yes   Correct Procedure: Yes   Correct Site: Yes   Correct Laterality: Yes   Correct Position: Yes   Site Marked: Yes   .   Procedure Documentation    .    Procedure:  right  Brachial plexus and Interscalene.  Local skin infiltrated with 0.2 mL of 1% lidocaine.     Ultrasound used to identify targeted nerve, plexus, or vascular marker and placed a needle adjacent to it., Ultrasound was used to visualize the spread of the anesthetic in close proximity to the above stated nerve.   Patient Prep;mask, sterile gloves, povidone-iodine 7.5% surgical scrub.  .  Needle: insulated Needle Gauge: 22.  Needle Length (millimeters) 80  Insertion Method: Single Shot.       Assessment/Narrative  Paresthesias: No.  Injection made incrementally with aspirations every 5 mL..  The placement was negative for: blood aspirated, painful injection and site bleeding.  Bolus given via needle. No blood aspirated via catheter.   Secured via.   Complications: none. Comments:  30 ml 0.5% bupivicaine placed.

## 2018-12-05 NOTE — IP AVS SNAPSHOT
MRN:[id number]                      After Visit Summary   12/5/2018    Lisa Johnston    MRN: [id number]           Thank you!     Thank you for choosing [facility] for your care. Our goal is always to provide you with excellent care. Hearing back from our patients is one way we can continue to improve our services. Please take a few minutes to complete the written survey that you may receive in the mail after you visit. If you would like to speak to someone directly about your visit please contact Patient Relations at [phone]. Thank you!          Patient Information     Date Of Birth          1940        About your hospital stay     You were admitted on:  December 5, 2018 You last received care in the:  [facility]    You were discharged on:  December 7, 2018        Reason for your hospital stay       S/p right reverse total shoulder arthroscopy                  Who to Call     For medical emergencies, please call 911.  For non-urgent questions about your medical care, please call your primary care provider or clinic, [phone]  For questions related to your surgery, please call your surgery clinic        Attending Provider     Provider Specialty    Mamadou Chen MD Orthopaedic Surgery       Primary Care Provider Office Phone # Fax #    Wilfred Medina [phone]      After Care Instructions     Activity       Your activity upon discharge: NWB to RUE. Sling x 4 weeks.            Diet       Follow this diet upon discharge: regular            Discharge Instructions       NWB to RUE and sling x 4 weeks.   Daily Codmans and pendulum swing exercises.     Take Aspirin 325 mg 2x daily for 4 weeks for prevention of blood clots.   Take Tylenol, Tramadol, and Oxycodone for pain.   Take Senna for constipation.     Please follow up with Dr. Chen with [facility] in 2 weeks for staple removal and wound check. Call his care coordinator  "Nicole at [phone] to make an appointment.            Wound care and dressings       Instructions to care for your wound at home: Leave dressing intact and remove POD#7.                  Follow-up Appointments     Follow-up and recommended labs and tests        Please follow up with Dr. Chen with [facility] in 2 weeks. Call his care coordinator Nicole at [phone] to make an appointment.    No follow up labs or test are needed.    Hold your losartan and spironolactone as your blood pressures are on the low side, likely due to pain medications.   See your primary MD next week for recheck of BPs and neck stiffness                  Pending Results     Date and Time Order Name Status Description    12/5/2018 0825 Anaerobic bacterial culture Preliminary             Statement of Approval     Ordered          12/07/18 1552  I have reviewed and agree with all the recommendations and orders detailed in this document.  EFFECTIVE NOW     Approved and electronically signed by:  Teagan Saucedo MD           12/06/18 6550  I have reviewed and agree with all the recommendations and orders detailed in this document.  EFFECTIVE NOW     Approved and electronically signed by:  Priyanka Grimes PA-C             Admission Information     Date & Time Provider Department Dept. Phone    12/5/2018 Mamadou Chen MD [facility] [phone]      Your Vitals Were     Blood Pressure Pulse Temperature Respirations Height Weight    108/43 91 98.4  F (36.9  C) (Oral) 18 1.6 m (5' 3\") 88.9 kg (196 lb)    Pulse Oximetry BMI (Body Mass Index)                97% 34.72 kg/m2          MyChart Information     [employer] lets you send messages to your doctor, view your test results, renew your prescriptions, schedule appointments and more. To sign up, go to [email or web address] . Click on \"Log in\" on the left side of the screen, which will take you to the Welcome page. Then click on \"Sign up Now\" on the right side " of the page.     You will be asked to enter the access code listed below, as well as some personal information. Please follow the directions to create your username and password.     Your access code is: [email or web address]  Expires: 3/7/2019  3:51 PM     Your access code will  in 90 days. If you need help or a new code, please call your [place] clinic or [phone].        Care EveryWhere ID     This is your Care EveryWhere ID. This could be used by other organizations to access your [place] medical records  [id number]        Equal Access to Services     [facility]: Hadii benito Saldivar, waelena mccann, roberto james, chaz escalona . So [facility] [phone].    ATENCIÓN: Si habla español, tiene a dennis disposición servicios gratuitos de asistencia lingüística. Llame al [phone].    We comply with applicable federal civil rights laws and [place] laws. We do not discriminate on the basis of race, color, national origin, age, disability, sex, sexual orientation, or gender identity.               Review of your medicines      START taking        Dose / Directions    acetaminophen 325 MG tablet   Commonly known as:  TYLENOL        Dose:  650 mg   Start taking on:  2018   Take 2 tablets (650 mg) by mouth every 4 hours as needed for pain   Quantity:  100 tablet   Refills:  0       aspirin 325 MG EC tablet   Commonly known as:  ASA        Dose:  325 mg   Take 1 tablet (325 mg) by mouth 2 times daily   Quantity:  60 tablet   Refills:  0       cyclobenzaprine 5 MG tablet   Commonly known as:  FLEXERIL   Used for:  Spasm of muscle        Dose:  5 mg   Take 1 tablet (5 mg) by mouth 3 times daily as needed for muscle spasms   Quantity:  12 tablet   Refills:  0       oxyCODONE 5 MG tablet   Commonly known as:  ROXICODONE        Dose:  5-10 mg   Take 1-2 tablets (5-10 mg) by mouth every 4 hours as needed for severe pain   Quantity:  30 tablet   Refills:  0        senna-docusate 8.6-50 MG tablet   Commonly known as:  SENOKOT-S/PERICOLACE        Dose:  2 tablet   Take 2 tablets by mouth 2 times daily   Quantity:  100 tablet   Refills:  0         CONTINUE these medicines which may have CHANGED, or have new prescriptions. If we are uncertain of the size of tablets/capsules you have at home, strength may be listed as something that might have changed.        Dose / Directions    losartan 25 MG tablet   Commonly known as:  COZAAR   This may have changed:  additional instructions   Used for:  Benign essential hypertension        Dose:  25 mg   Take 1 tablet (25 mg) by mouth 2 times daily Hold this medication until re-evaluated by primary MD next week   Quantity:  30 tablet   Refills:  0         CONTINUE these medicines which have NOT CHANGED        Dose / Directions    BERBERINE COMPLEX PO        Dose:  500 mg   Take 500 mg by mouth 2 times daily   Refills:  0       COQ-10 PO        Dose:  100 mg   Take 100 mg by mouth daily   Refills:  0       FOLIC ACID PO        Dose:  10 drop   Take 10 drops by mouth daily Super Liquid Folate   Refills:  0       HAIR SKIN NAILS PO        Dose:  2 tablet   Take 2 tablets by mouth daily   Refills:  0       KLS ALLER-ISIDRA PO        Dose:  10 mg   Take 10 mg by mouth daily   Refills:  0       KRILL OIL PO        Dose:  500 mg   Take 500 mg by mouth daily   Refills:  0       LIPOIC ACID PO        Dose:  300 mg   Take 300 mg by mouth 2 times daily   Refills:  0       MELATONIN PO        Dose:  10 mg   Take 10 mg by mouth daily   Refills:  0       METOPROLOL SUCCINATE ER PO        Dose:  25 mg   Take 25 mg by mouth daily (Takes 0.5 x 50mg tablet = 25mg dose)   Refills:  0       minoxidil 2 % external solution   Commonly known as:  ROGAINE        Apply topically 2 times daily   Refills:  0       PREDNISONE PO        Dose:  10 mg   Take 10 mg by mouth daily   Refills:  0       PROBIOTIC PO        Dose:  2 tablet   Take 2 tablets by mouth daily   Refills:   0       spironolactone 25 MG tablet   Commonly known as:  ALDACTONE   Used for:  Benign essential hypertension        Dose:  25 mg   Take 1 tablet (25 mg) by mouth daily   Quantity:  30 tablet   Refills:  0       SUPER B COMPLEX PO        Dose:  1 tablet   Take 1 tablet by mouth daily   Refills:  0       traMADol 50 MG tablet   Commonly known as:  ULTRAM        Dose:  50 mg   Take 1 tablet (50 mg) by mouth every 6 hours as needed for moderate pain   Quantity:  20 tablet   Refills:  0       TURMERIC PO        Dose:  500 mg   Take 500 mg by mouth 2 times daily   Refills:  0       VITAMIN C PO        Dose:  1000 mg   Take 1,000 mg by mouth 3 times daily   Refills:  0       VITAMIN D-VITAMIN K PO        Dose:  15 drop   Take 15 drops by mouth daily   Refills:  0       VITAMIN E PO        Dose:  400 Units   Take 400 Units by mouth daily   Refills:  0            Where to get your medicines      These medications were sent to [facility] [place] - [address]  [address] [facility] [place] [address]     Phone:  [phone]     cyclobenzaprine 5 MG tablet         Some of these will need a paper prescription and others can be bought over the counter. Ask your nurse if you have questions.     Bring a paper prescription for each of these medications     acetaminophen 325 MG tablet    aspirin 325 MG EC tablet    oxyCODONE 5 MG tablet    senna-docusate 8.6-50 MG tablet    traMADol 50 MG tablet       You don't need a prescription for these medications     losartan 25 MG tablet    spironolactone 25 MG tablet                Protect others around you: Learn how to safely use, store and throw away your medicines at www.disposemymeds.org.        Information about OPIOIDS     PRESCRIPTION OPIOIDS: WHAT YOU NEED TO KNOW   We gave you an opioid (narcotic) pain medicine. It is important to manage your pain, but opioids are not always the best choice. You should first try all the other options your care  team gave you. Take this medicine for as short a time (and as few doses) as possible.    Some activities can increase your pain, such as bandage changes or therapy sessions. It may help to take your pain medicine 30 to 60 minutes before these activities. Reduce your stress by getting enough sleep, working on hobbies you enjoy and practicing relaxation or meditation. Talk to your care team about ways to manage your pain beyond prescription opioids.    These medicines have risks:    DO NOT drive when on new or higher doses of pain medicine. These medicines can affect your alertness and reaction times, and you could be arrested for driving under the influence (DUI). If you need to use opioids long-term, talk to your care team about driving.    DO NOT operate heavy machinery    DO NOT do any other dangerous activities while taking these medicines.    DO NOT drink any alcohol while taking these medicines.     If the opioid prescribed includes acetaminophen, DO NOT take with any other medicines that contain acetaminophen. Read all labels carefully. Look for the word  acetaminophen  or  Tylenol.  Ask your pharmacist if you have questions or are unsure.    You can get addicted to pain medicines, especially if you have a history of addiction (chemical, alcohol or substance dependence). Talk to your care team about ways to reduce this risk.    All opioids tend to cause constipation. Drink plenty of water and eat foods that have a lot of fiber, such as fruits, vegetables, prune juice, apple juice and high-fiber cereal. Take a laxative (Miralax, milk of magnesia, Colace, Senna) if you don t move your bowels at least every other day. Other side effects include upset stomach, sleepiness, dizziness, throwing up, tolerance (needing more of the medicine to have the same effect), physical dependence and slowed breathing.    Store your pills in a secure place, locked if possible. We will not replace any lost or stolen medicine. If you  don t finish your medicine, please throw away (dispose) as directed by your pharmacist. The [place] Pollution Control Agency has more information about safe disposal: https://www.pca.state.[place]/living-green/managing-unwanted-medications             Medication List: This is a list of all your medications and when to take them. Check marks below indicate your daily home schedule. Keep this list as a reference.      Medications           Morning Afternoon Evening Bedtime As Needed    acetaminophen 325 MG tablet   Commonly known as:  TYLENOL   Take 2 tablets (650 mg) by mouth every 4 hours as needed for pain   Start taking on:  12/8/2018   Last time this was given:  975 mg on 12/7/2018  8:05 AM                                   aspirin 325 MG EC tablet   Commonly known as:  ASA   Take 1 tablet (325 mg) by mouth 2 times daily                                BERBERINE COMPLEX PO   Take 500 mg by mouth 2 times daily   Next Dose Due:  Resume home schedule                                 COQ-10 PO   Take 100 mg by mouth daily   Next Dose Due:  Resume home schedule                                 cyclobenzaprine 5 MG tablet   Commonly known as:  FLEXERIL   Take 1 tablet (5 mg) by mouth 3 times daily as needed for muscle spasms   Last time this was given:  5 mg on 12/7/2018 12:46 PM                                   FOLIC ACID PO   Take 10 drops by mouth daily Super Liquid Folate   Next Dose Due:  Resume home schedule                                 HAIR SKIN NAILS PO   Take 2 tablets by mouth daily   Next Dose Due:  Resume home schedule                                 KLS ALLER-ISIDRA PO   Take 10 mg by mouth daily   Next Dose Due:  Resume home schedule                                 KRILL OIL PO   Take 500 mg by mouth daily   Next Dose Due:  Resume home schedule                                 LIPOIC ACID PO   Take 300 mg by mouth 2 times daily   Next Dose Due:  Resume home schedule                                 losartan  25 MG tablet   Commonly known as:  COZAAR   Take 1 tablet (25 mg) by mouth 2 times daily Hold this medication until re-evaluated by primary MD next week   Last time this was given:  25 mg on 12/6/2018  9:02 PM   Next Dose Due:  HOLD MED UNTILL RE-EVALUATED BY PRIMARY MD NEXT WEEK                                MELATONIN PO   Take 10 mg by mouth daily   Last time this was given:  10 mg on 12/6/2018  9:06 PM   Next Dose Due:  At bedtime                                 METOPROLOL SUCCINATE ER PO   Take 25 mg by mouth daily (Takes 0.5 x 50mg tablet = 25mg dose)   Last time this was given:  25 mg on 12/7/2018 12:47 PM   Next Dose Due:  Saturday                                 minoxidil 2 % external solution   Commonly known as:  ROGAINE   Apply topically 2 times daily   Next Dose Due:  Resume home schedule                                 oxyCODONE 5 MG tablet   Commonly known as:  ROXICODONE   Take 1-2 tablets (5-10 mg) by mouth every 4 hours as needed for severe pain   Last time this was given:  5 mg on 12/6/2018  2:20 PM   Next Dose Due:  Take after 6:20pm                                 PREDNISONE PO   Take 10 mg by mouth daily   Last time this was given:  10 mg on 12/7/2018  8:05 AM   Next Dose Due:  Saturday AM                                 PROBIOTIC PO   Take 2 tablets by mouth daily   Next Dose Due:  Resume home schedule                                 senna-docusate 8.6-50 MG tablet   Commonly known as:  SENOKOT-S/PERICOLACE   Take 2 tablets by mouth 2 times daily   Last time this was given:  2 tablets on 12/7/2018  8:05 AM   Next Dose Due:  Bedtime                                 spironolactone 25 MG tablet   Commonly known as:  ALDACTONE   Take 1 tablet (25 mg) by mouth daily   Last time this was given:  25 mg on 12/6/2018  9:29 AM   Next Dose Due:  Saturday                                 SUPER B COMPLEX PO   Take 1 tablet by mouth daily   Next Dose Due:  Resume home schedule                                  traMADol 50 MG tablet   Commonly known as:  ULTRAM   Take 1 tablet (50 mg) by mouth every 6 hours as needed for moderate pain   Last time this was given:  50 mg on 12/7/2018  3:07 PM   Next Dose Due:  9pm                                 TURMERIC PO   Take 500 mg by mouth 2 times daily   Next Dose Due:  Resume home schedule                                 VITAMIN C PO   Take 1,000 mg by mouth 3 times daily   Next Dose Due:  Resume home schedule                                 VITAMIN D-VITAMIN K PO   Take 15 drops by mouth daily   Next Dose Due:  Resume home schedule                                 VITAMIN E PO   Take 400 Units by mouth daily   Next Dose Due:  Resume home schedule

## 2018-12-05 NOTE — ANESTHESIA POSTPROCEDURE EVALUATION
Patient: Lisa Johnston    Procedure(s):  right reverse total shoulder arthroplasty    Diagnosis:DJD  Diagnosis Additional Information: R shoulder proximal humerus fracture    Anesthesia Type:  General, ETT, Periph. Nerve Block for postop pain    Note:  Anesthesia Post Evaluation    Patient location during evaluation: PACU  Patient participation: Able to fully participate in evaluation  Level of consciousness: awake and alert  Pain management: adequate  Airway patency: patent  Cardiovascular status: acceptable  Respiratory status: acceptable  Hydration status: acceptable  PONV: none     Anesthetic complications: None          Last vitals:  Vitals:    12/05/18 1129 12/05/18 1141 12/05/18 1146   BP: 131/82     Pulse:      Resp: 27 (P) 15 (P) 18   Temp: 97.1  F (36.2  C)     SpO2: 98% (P) 99%          Electronically Signed By: Elie Gasca MD  December 5, 2018  12:02 PM

## 2018-12-05 NOTE — PLAN OF CARE
Problem: Patient Care Overview  Goal: Plan of Care/Patient Progress Review  Outcome: No Change  Day RN  VS monitored, 2L NC, drsg C/D/I, CMS+ ex 1+ radial pulse bilaterally, DTV, extensive ecchymosis to R side of body d/t fall prior to admission, family at b/s, IV Dilaudid 1x, sharron clears, skin tear under L eye: vaseline/gauze applied in PACU, will cont to monitor.

## 2018-12-05 NOTE — PROGRESS NOTES
I dropped off a medium Ultrasling III this morning.  I checked patient in Pacu to ensure fitting of orthosis and left paper with information on contacting our office if any issues with orthosis.  Michael VALLE.

## 2018-12-05 NOTE — IP AVS SNAPSHOT
[facility]    [address] [facility] [address]    Phone:  [phone]    Fax:  [phone]                                       After Visit Summary   12/5/2018    Lisa Johnston    MRN: [id number]           After Visit Summary Signature Page     I have received my discharge instructions, and my questions have been answered. I have discussed any challenges I see with this plan with the nurse or doctor.    ..........................................................................................................................................  Patient/Patient Representative Signature      ..........................................................................................................................................  Patient Representative Print Name and Relationship to Patient    ..................................................               ................................................  Date                                   Time    ..........................................................................................................................................  Reviewed by Signature/Title    ...................................................              ..............................................  Date                                               Time          22EPIC Rev 08/18

## 2018-12-05 NOTE — ANESTHESIA PREPROCEDURE EVALUATION
PAC NOTE:       ANESTHESIA PRE EVALUATION:  Anesthesia Evaluation     . Pt has had prior anesthetic. Type: General    No history of anesthetic complications          ROS/MED HX    ENT/Pulmonary:     (+)asthma , . .    Neurologic:     (+)TIA     Cardiovascular:     (+) hypertension----. : . . . :. .       METS/Exercise Tolerance:     Hematologic:  - neg hematologic  ROS       Musculoskeletal:   (+) arthritis, fracture upper extremity, , -       GI/Hepatic:     (+) GERD Asymptomatic on medication,       Renal/Genitourinary:  - ROS Renal section negative       Endo:  - neg endo ROS   (+) Obesity, .      Psychiatric:  - neg psychiatric ROS       Infectious Disease:  - neg infectious disease ROS       Malignancy:      - no malignancy   Other:    - neg other ROS                 Physical Exam  Normal systems: cardiovascular, pulmonary and dental    Airway   Mallampati: III  TM distance: >3 FB  Neck ROM: full    Dental     Cardiovascular       Pulmonary              Anesthesia Plan      History & Physical Review  History and physical reviewed and following examination; no interval change.    ASA Status:  3 .    NPO Status:  > 8 hours    Plan for General, ETT and Periph. Nerve Block for postop pain with Intravenous and Propofol induction. Maintenance will be Balanced.    PONV prophylaxis:  Ondansetron (or other 5HT-3) and Dexamethasone or Solumedrol       Postoperative Care  Postoperative pain management:  IV analgesics and Oral pain medications.      Consents  Anesthetic plan, risks, benefits and alternatives discussed with:  Patient or representative and Patient..                            .

## 2018-12-05 NOTE — PHARMACY-ADMISSION MEDICATION HISTORY
Admission medication history interview status for this patient is complete. See [facility] admission navigator for allergy information, prior to admission medications and immunization status.     Med rec completed by pre admitting RN and reviewed by Pharmacy     Brandi Landon RN Tue Dec 4, 2018  2:10 PM           Prior to Admission medications    Medication Sig Last Dose Taking? Auth Provider   Alpha-Lipoic Acid (LIPOIC ACID PO) Take 300 mg by mouth 2 times daily  Yes Reported, Patient   Ascorbic Acid (VITAMIN C PO) Take 1,000 mg by mouth 3 times daily   Yes Reported, Patient   B Complex-C (SUPER B COMPLEX PO) Take 1 tablet by mouth daily   Yes Reported, Patient   Barberry-Oreg Grape-Goldenseal (BERBERINE COMPLEX PO) Take 500 mg by mouth 2 times daily   Yes Reported, Patient   Cetirizine HCl (KLS ALLER-ISIDRA PO) Take 10 mg by mouth daily  Yes Reported, Patient   Coenzyme Q10 (COQ-10 PO) Take 100 mg by mouth daily   Yes Reported, Patient   FOLIC ACID PO Take 10 drops by mouth daily Super Liquid Folate  Yes Reported, Patient   KRILL OIL PO Take 500 mg by mouth daily  Yes Reported, Patient   LOSARTAN POTASSIUM PO Take 25 mg by mouth 2 times daily  Yes Reported, Patient   MELATONIN PO Take 10 mg by mouth daily   Yes Reported, Patient   METOPROLOL SUCCINATE ER PO Take 25 mg by mouth daily (Takes 0.5 x 50mg tablet = 25mg dose)  Yes Reported, Patient   minoxidil (ROGAINE) 2 % external solution Apply topically 2 times daily  Yes Reported, Patient   Multiple Vitamins-Minerals (HAIR SKIN NAILS PO) Take 2 tablets by mouth daily  Yes Reported, Patient   PREDNISONE PO Take 10 mg by mouth daily  Yes Reported, Patient   Probiotic Product (PROBIOTIC PO) Take 2 tablets by mouth daily   Yes Reported, Patient   SPIRONOLACTONE PO Take 25 mg by mouth daily  Yes Reported, Patient   traMADol (ULTRAM) 50 MG tablet Take 1 tablet (50 mg) by mouth every 6 hours as needed for moderate pain  Yes Price Prajapati MD   TURMERIC PO Take 500 mg by  mouth 2 times daily   Yes Reported, Patient   VITAMIN D-VITAMIN K PO Take 15 drops by mouth daily  Yes Reported, Patient   VITAMIN E PO Take 400 Units by mouth daily   Yes Reported, Patient

## 2018-12-05 NOTE — PROGRESS NOTES
Arrived to room 607 from PACU at 1415 via cart, transferred to bed via hover mat without difficulty, alert and oriented x 4, oriented to room and call system, dressing CDI, CMS intact, IV patent and infusing, pt c/o sharp pain, reviewed welcome folder and pain/medication information packet with patient and spouse. SCD's on BLE. Volodymyr ice chips well. Frequent VS started.

## 2018-12-05 NOTE — ANESTHESIA CARE TRANSFER NOTE
Patient: Lisa Johnston    Procedure(s):  right reverse total shoulder arthroplasty    Diagnosis: DJD  Diagnosis Additional Information: No value filed.    Anesthesia Type:   General, ETT, Periph. Nerve Block for postop pain     Note:  Airway :Face Mask  Patient transferred to:PACU  Comments: Did wellHandoff Report: Identifed the Patient, Identified the Reponsible Provider, Reviewed the pertinent medical history, Discussed the surgical course, Reviewed Intra-OP anesthesia mangement and issues during anesthesia, Set expectations for post-procedure period and Allowed opportunity for questions and acknowledgement of understanding      Vitals: (Last set prior to Anesthesia Care Transfer)    CRNA VITALS  12/5/2018 1056 - 12/5/2018 1134      12/5/2018             Pulse: 82    SpO2: 98 %                Electronically Signed By: AMADOR Houston CRNA  December 5, 2018  11:34 AM

## 2018-12-05 NOTE — BRIEF OP NOTE
[facility] Brief Operative Note    Pre-operative diagnosis: R shoulder proximal humerus fracture   Post-operative diagnosis same   Procedure: Procedure(s):  right reverse total shoulder arthroplasty   Surgeon(s): Surgeon(s) and Role:     * Mamadou Chen MD - Primary     * Priyanka Grimes PA-C - Assisting   Estimated blood loss: 350 mL    Specimens:   ID Type Source Tests Collected by Time Destination   1 : right shoulder Wound Shoulder ANAEROBIC BACTERIAL CULTURE, GRAM STAIN, WOUND CULTURE AEROBIC BACTERIAL Mamadou Chen MD 12/5/2018  8:24 AM       Findings: See operative dictation    NWB RUE  PT/OT - FF 0-90, no ER/IR, PROM and AAROM  DVT prophylaxis while in hospital  Pain control  abx x24 hours

## 2018-12-05 NOTE — CONSULTS
[facility]  Hospitalist Consult Note  Name: Lisa Johnston    MRN: [id number]  YOB: 1940    Age: 78 year old  Date of admission: 12/5/2018  Primary care provider: Wilfred Medina     Requesting Physician:  Dr Chen  Reason for consult:  Post-operative medical management         Assessment and Plan:   Lisa Johnston is a 78 year old female with a history of hypertension, psoriatic arthritis, on prednisone 10 mg a day for the past month due to refractory back pain who was admitted for elective right total shoulder replacement for refractory arthritis.  Her only postoperative complaint is that of itching inside her nose from the nasal cannula    1. 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    2.  Nasal irritation: Could have a contact allergy we will go ahead and trial cetirizine 5 mg a day.  If she develops a rash that is going to have to stop her postoperative monitoring and change it over to a pulse oximetry    3.  Steroid use: She currently is on prednisone 10 mg a day and has been so for the past month.  She states it is in the setting of some back/hip pain.  She is not on a taper.  We will continue it at her outpatient dosing currently but if blood pressures become low and refractory despite appropriate measures consider stress dose steroids    4.  Hypertension Home regimen is losartan 25 mg p.o. twice daily, metoprolol 25 mg p.o. twice daily, and spironolactone 25 mg a day.  Will resume all with staggered parameters in place      Code status: full  Prophylaxis: PCDs  Disposition Plan   Expected discharge in 1-2 days to prior living arrangement once independent in ADLs after recent TSA.     Entered: Teagan Saucedo 12/05/2018, 3:56 PM       Thank you for the consultation, we will continue to follow along during the hospitalization. Please page with any questions or  concerns.         History of Present Illness:   Lisa Johnston is a 78 year old female who was hospitalized for right total shoulder reverse arthroplasty for refractory DJD.  Past medical history is also notable for being on prednisone 10 mg a day for the past month due to refractory back pain per her description, psoriatic arthritis for which she is not on immunomodulating agents, and hypertension pre-operative note was fully reviewed and recommendations acknowledged. Op note and anesthesia notes and flow sheets reviewed. Procedure was about 3.5 hours.  BPs with some mild variability but no MAPs < 60, and HR stable throughout procedure.  I/O 1800 LR in,  ml and  ml     The patient had no complications related to the procedure and has had an unremarkable post-operative course to this point.   He only complaint is that she has some itching in her nose related to the nasal canula in place for post op CO and O2 monitoring. Currently pain is adequately controlled. No nausea, vomiting, diarrhea or constipation. No fevers, chills, diaphoresis. No chest pain, palpitations, dyspnea. Rehman catheter still in place. Tolerating oral intake. No excessive somnolence and patient is fully alert and oriented. The patient has no other complaints at this time.                Past Medical History:     Past Medical History:   Diagnosis Date     Arthritis     psoriatic arthritis     Back pain     w/ hx lumbar decompression in 5/2018, initiated on prednisone 10 mg in 11/2018 due to refractory symptoms     Gastro-oesophageal reflux disease      Hiatal hernia      Hypertension      Noninfectious ileitis     IBS     Obese      Renal disease      Uncomplicated asthma      Unspecified cerebral artery occlusion with cerebral infarction     2011             Past Surgical History:     Past Surgical History:   Procedure Laterality Date     ABDOMEN SURGERY      partial hysterectomy     APPENDECTOMY       ARTHROSCOPY SHOULDER RT/LT       right x2, left x1     COLONOSCOPY       CRYOTHERAPY  10/30/2013    Procedure: CRYOTHERAPY;;  Surgeon: Ramírez Warner MD;  Location:  [facility]     DECOMPRESSION, FUSION LUMBAR POSTERIOR ONE LEVEL, COMBINED Bilateral 5/30/2018    Procedure: COMBINED DECOMPRESSION, FUSION LUMBAR POSTERIOR ONE LEVEL;  BILATERAL L4-5 DECOMPRESSION, L4-5 POSTERIOR SPINAL FUSION WITH MEDTRONIC INSTRUMENTATION, RIGHT ILIAC CREST BONE GRAFT ;  Surgeon: Price Prajapati MD;  Location:  OR     GENITOURINARY SURGERY      vaginal repair     GRAFT BONE FROM ILIAC CREST Right 5/30/2018    Procedure: GRAFT BONE FROM ILIAC CREST;;  Surgeon: Price Prajapati MD;  Location:  OR     ORTHOPEDIC SURGERY Left     bunionectomy x1     ORTHOPEDIC SURGERY      left foot pinning, right foot pinning     REPAIR BLADDER       VITRECTOMY PARSPLANA WITH 23 GAUGE SYSTEM  10/30/2013    Procedure: VITRECTOMY PARSPLANA WITH 23 GAUGE SYSTEM;  RIGHT 23 GAUGE PARSPLANA VITRECTOMY, ENDOLASER, AIR FLUID EXCHANGE, INFUSION 30% SF6 GAS, CRYOTHERAPY;  Surgeon: Ramírez Warner MD;  Location: [facility]               Social History:     Social History   Substance Use Topics     Smoking status: Never Smoker     Smokeless tobacco: Never Used     Alcohol use No             Family History:   Family History   Family history reviewed with patient and is noncontributory.          Allergies:     Allergies   Allergen Reactions     Penicillins Anaphylaxis and Difficulty breathing     Adhesive Tape Blisters     Gluten Meal      Hmg-Coa-R Inhibitors Other (See Comments)     Leg pain     Levaquin [Levofloxacin]      Joint pain     Sulfa Drugs Itching     Latex Rash             Medications:     Prior to Admission medications    Medication Sig Last Dose Taking? Auth Provider   Alpha-Lipoic Acid (LIPOIC ACID PO) Take 300 mg by mouth 2 times daily 12/4/2018 at Unknown time Yes Reported, Patient   Ascorbic Acid (VITAMIN C PO) Take 1,000 mg by mouth 3 times daily  12/4/2018 at Unknown  time Yes Reported, Patient   B Complex-C (SUPER B COMPLEX PO) Take 1 tablet by mouth daily  12/4/2018 at Unknown time Yes Reported, Patient   Barberry-Oreg Grape-Goldenseal (BERBERINE COMPLEX PO) Take 500 mg by mouth 2 times daily  12/4/2018 at Unknown time Yes Reported, Patient   Cetirizine HCl (KLS ALLER-ISIDRA PO) Take 10 mg by mouth daily 12/4/2018 at Unknown time Yes Reported, Patient   Coenzyme Q10 (COQ-10 PO) Take 100 mg by mouth daily  12/4/2018 at Unknown time Yes Reported, Patient   FOLIC ACID PO Take 10 drops by mouth daily Super Liquid Folate 12/4/2018 at Unknown time Yes Reported, Patient   KRILL OIL PO Take 500 mg by mouth daily 12/4/2018 at Unknown time Yes Reported, Patient   LOSARTAN POTASSIUM PO Take 25 mg by mouth 2 times daily 12/5/2018 at Unknown time Yes Reported, Patient   MELATONIN PO Take 10 mg by mouth daily  12/4/2018 at Unknown time Yes Reported, Patient   METOPROLOL SUCCINATE ER PO Take 25 mg by mouth daily (Takes 0.5 x 50mg tablet = 25mg dose) 12/5/2018 at Unknown time Yes Reported, Patient   minoxidil (ROGAINE) 2 % external solution Apply topically 2 times daily 12/4/2018 at Unknown time Yes Reported, Patient   Multiple Vitamins-Minerals (HAIR SKIN NAILS PO) Take 2 tablets by mouth daily 12/4/2018 at Unknown time Yes Reported, Patient   PREDNISONE PO Take 10 mg by mouth daily 12/4/2018 at Unknown time Yes Reported, Patient   Probiotic Product (PROBIOTIC PO) Take 2 tablets by mouth daily  12/4/2018 at Unknown time Yes Reported, Patient   SPIRONOLACTONE PO Take 25 mg by mouth daily 12/4/2018 at Unknown time Yes Reported, Patient   traMADol (ULTRAM) 50 MG tablet Take 1 tablet (50 mg) by mouth every 6 hours as needed for moderate pain 12/4/2018 at Unknown time Yes Price Prajapati MD   TURMERIC PO Take 500 mg by mouth 2 times daily  12/4/2018 at Unknown time Yes Reported, Patient   VITAMIN D-VITAMIN K PO Take 15 drops by mouth daily 12/4/2018 at Unknown time Yes Reported, Patient   VITAMIN  "E PO Take 400 Units by mouth daily  12/4/2018 at Unknown time Yes Reported, Patient       Current hospital administered medication list (MAR) also reviewed.          Review of Systems:   A comprehensive greater than 10 system review of systems was carried out.  Pertinent positives and negatives are noted above.  Otherwise negative for contributory info.            Physical Exam:   Blood pressure 142/50, pulse 78, temperature 97.2  F (36.2  C), temperature source Oral, resp. rate 11, height 1.6 m (5' 3\"), weight 88.9 kg (196 lb), SpO2 97 %.  Exam:  GENERAL: No apparent distress. Awake, alert, and fully oriented.  HEENT: Normocephalic, atraumatic. Extraocular movements intact.  CARDIOVASCULAR: Regular rate and rhythm without murmurs or rubs. No S3.  PULMONARY: Clear bilaterally.  ABDOMINAL: Soft, non-tender, non-distended. Bowel sounds normoactive. No hepatosplenomegaly.  EXTREMITIES: No cyanosis or clubbing. No edema.  NEUROLOGICAL: CN 2-12 grossly intact, no focal neurological deficits.  DERMATOLOGICAL: No rash, ulcer, ecchymoses, jaundice.         Data:   Imaging:  Personally reviewed.    EKG/Telemetry:  Personally reviewed.    Labs: Personally reviewed.     Recent Labs  Lab 12/05/18  1449          Recent Labs  Lab 12/05/18  1449   CR 0.99   GFRESTIMATED 54*   GFRESTBLACK 65         Teagan Saucedo MD  [facility]    "

## 2018-12-06 ENCOUNTER — APPOINTMENT (OUTPATIENT)
Dept: OCCUPATIONAL THERAPY | Facility: CLINIC | Age: 78
DRG: 483 | End: 2018-12-06
Attending: ORTHOPAEDIC SURGERY
Payer: MEDICARE

## 2018-12-06 LAB
ANION GAP SERPL CALCULATED.3IONS-SCNC: 5 MMOL/L (ref 3–14)
BUN SERPL-MCNC: 18 MG/DL (ref 7–30)
CALCIUM SERPL-MCNC: 8.2 MG/DL (ref 8.5–10.1)
CHLORIDE SERPL-SCNC: 102 MMOL/L (ref 94–109)
CO2 SERPL-SCNC: 27 MMOL/L (ref 20–32)
CREAT SERPL-MCNC: 1.01 MG/DL (ref 0.52–1.04)
GFR SERPL CREATININE-BSD FRML MDRD: 53 ML/MIN/1.7M2
GLUCOSE SERPL-MCNC: 109 MG/DL (ref 70–99)
HGB BLD-MCNC: 8.7 G/DL (ref 11.7–15.7)
POTASSIUM SERPL-SCNC: 4.6 MMOL/L (ref 3.4–5.3)
SODIUM SERPL-SCNC: 134 MMOL/L (ref 133–144)

## 2018-12-06 PROCEDURE — 36415 COLL VENOUS BLD VENIPUNCTURE: CPT | Performed by: ORTHOPAEDIC SURGERY

## 2018-12-06 PROCEDURE — 25000125 ZZHC RX 250: Performed by: ORTHOPAEDIC SURGERY

## 2018-12-06 PROCEDURE — A9270 NON-COVERED ITEM OR SERVICE: HCPCS | Mod: GY | Performed by: ORTHOPAEDIC SURGERY

## 2018-12-06 PROCEDURE — 97165 OT EVAL LOW COMPLEX 30 MIN: CPT | Mod: GO

## 2018-12-06 PROCEDURE — 85018 HEMOGLOBIN: CPT | Performed by: ORTHOPAEDIC SURGERY

## 2018-12-06 PROCEDURE — 25000128 H RX IP 250 OP 636: Performed by: ORTHOPAEDIC SURGERY

## 2018-12-06 PROCEDURE — 99207 ZZC CDG-MDM COMPONENT: MEETS LOW - DOWN CODED: CPT | Performed by: INTERNAL MEDICINE

## 2018-12-06 PROCEDURE — 25000125 ZZHC RX 250: Performed by: INTERNAL MEDICINE

## 2018-12-06 PROCEDURE — 25000132 ZZH RX MED GY IP 250 OP 250 PS 637: Mod: GY | Performed by: INTERNAL MEDICINE

## 2018-12-06 PROCEDURE — 80048 BASIC METABOLIC PNL TOTAL CA: CPT | Performed by: ORTHOPAEDIC SURGERY

## 2018-12-06 PROCEDURE — 97530 THERAPEUTIC ACTIVITIES: CPT | Mod: GO

## 2018-12-06 PROCEDURE — 25000132 ZZH RX MED GY IP 250 OP 250 PS 637: Mod: GY | Performed by: ORTHOPAEDIC SURGERY

## 2018-12-06 PROCEDURE — 99231 SBSQ HOSP IP/OBS SF/LOW 25: CPT | Performed by: INTERNAL MEDICINE

## 2018-12-06 PROCEDURE — 97535 SELF CARE MNGMENT TRAINING: CPT | Mod: GO

## 2018-12-06 PROCEDURE — A9270 NON-COVERED ITEM OR SERVICE: HCPCS | Mod: GY | Performed by: INTERNAL MEDICINE

## 2018-12-06 PROCEDURE — 12000007 ZZH R&B INTERMEDIATE

## 2018-12-06 PROCEDURE — 40000133 ZZH STATISTIC OT WARD VISIT

## 2018-12-06 RX ORDER — TRAMADOL HYDROCHLORIDE 50 MG/1
50 TABLET ORAL 2 TIMES DAILY
Status: DISCONTINUED | OUTPATIENT
Start: 2018-12-06 | End: 2018-12-07 | Stop reason: HOSPADM

## 2018-12-06 RX ORDER — OXYCODONE HYDROCHLORIDE 5 MG/1
5-10 TABLET ORAL EVERY 4 HOURS PRN
Qty: 30 TABLET | Refills: 0 | Status: SHIPPED | OUTPATIENT
Start: 2018-12-06 | End: 2021-11-05

## 2018-12-06 RX ORDER — ACETAMINOPHEN 325 MG/1
650 TABLET ORAL EVERY 4 HOURS PRN
Qty: 100 TABLET | Refills: 0 | Status: SHIPPED | OUTPATIENT
Start: 2018-12-08

## 2018-12-06 RX ORDER — OXYCODONE HYDROCHLORIDE 5 MG/1
5 TABLET ORAL EVERY 4 HOURS PRN
Status: DISCONTINUED | OUTPATIENT
Start: 2018-12-06 | End: 2018-12-07 | Stop reason: HOSPADM

## 2018-12-06 RX ORDER — OXYCODONE HYDROCHLORIDE 5 MG/1
5-10 TABLET ORAL EVERY 4 HOURS PRN
Qty: 30 TABLET | Refills: 0 | Status: SHIPPED | OUTPATIENT
Start: 2018-12-06 | End: 2018-12-06

## 2018-12-06 RX ORDER — AMOXICILLIN 250 MG
2 CAPSULE ORAL 2 TIMES DAILY
Qty: 100 TABLET | Refills: 0 | Status: SHIPPED | OUTPATIENT
Start: 2018-12-06

## 2018-12-06 RX ORDER — TRAMADOL HYDROCHLORIDE 50 MG/1
50 TABLET ORAL EVERY 6 HOURS PRN
Qty: 20 TABLET | Refills: 0 | Status: ON HOLD | OUTPATIENT
Start: 2018-12-06 | End: 2021-12-01

## 2018-12-06 RX ADMIN — OXYCODONE HYDROCHLORIDE 5 MG: 5 TABLET ORAL at 00:54

## 2018-12-06 RX ADMIN — ACETAMINOPHEN 975 MG: 325 TABLET, FILM COATED ORAL at 00:50

## 2018-12-06 RX ADMIN — GABAPENTIN 100 MG: 100 CAPSULE ORAL at 21:02

## 2018-12-06 RX ADMIN — GABAPENTIN 100 MG: 100 CAPSULE ORAL at 08:51

## 2018-12-06 RX ADMIN — MELATONIN TAB 3 MG 10 MG: 3 TAB at 21:06

## 2018-12-06 RX ADMIN — OXYCODONE HYDROCHLORIDE 5 MG: 5 TABLET ORAL at 05:06

## 2018-12-06 RX ADMIN — SPIRONOLACTONE 25 MG: 25 TABLET ORAL at 09:29

## 2018-12-06 RX ADMIN — ACETAMINOPHEN 975 MG: 325 TABLET, FILM COATED ORAL at 16:22

## 2018-12-06 RX ADMIN — OXYCODONE HYDROCHLORIDE 10 MG: 5 TABLET ORAL at 08:50

## 2018-12-06 RX ADMIN — LOSARTAN POTASSIUM 25 MG: 25 TABLET ORAL at 09:29

## 2018-12-06 RX ADMIN — ACETAMINOPHEN 975 MG: 325 TABLET, FILM COATED ORAL at 08:51

## 2018-12-06 RX ADMIN — TRAMADOL HYDROCHLORIDE 50 MG: 50 TABLET, COATED ORAL at 12:21

## 2018-12-06 RX ADMIN — ENOXAPARIN SODIUM 40 MG: 40 INJECTION SUBCUTANEOUS at 05:03

## 2018-12-06 RX ADMIN — PREDNISONE 10 MG: 5 TABLET ORAL at 09:28

## 2018-12-06 RX ADMIN — OXYCODONE HYDROCHLORIDE 5 MG: 5 TABLET ORAL at 05:03

## 2018-12-06 RX ADMIN — CETIRIZINE HYDROCHLORIDE 5 MG: 5 TABLET ORAL at 09:28

## 2018-12-06 RX ADMIN — SENNOSIDES AND DOCUSATE SODIUM 2 TABLET: 8.6; 5 TABLET ORAL at 09:29

## 2018-12-06 RX ADMIN — SENNOSIDES AND DOCUSATE SODIUM 2 TABLET: 8.6; 5 TABLET ORAL at 21:02

## 2018-12-06 RX ADMIN — TRAMADOL HYDROCHLORIDE 50 MG: 50 TABLET, COATED ORAL at 21:02

## 2018-12-06 RX ADMIN — LOSARTAN POTASSIUM 25 MG: 25 TABLET ORAL at 21:02

## 2018-12-06 RX ADMIN — OXYCODONE HYDROCHLORIDE 5 MG: 5 TABLET ORAL at 14:20

## 2018-12-06 RX ADMIN — CLINDAMYCIN PHOSPHATE 900 MG: 900 INJECTION, SOLUTION INTRAVENOUS at 00:50

## 2018-12-06 RX ADMIN — GABAPENTIN 100 MG: 100 CAPSULE ORAL at 14:20

## 2018-12-06 RX ADMIN — METOPROLOL SUCCINATE 25 MG: 25 TABLET, EXTENDED RELEASE ORAL at 09:28

## 2018-12-06 ASSESSMENT — ACTIVITIES OF DAILY LIVING (ADL)
ADLS_ACUITY_SCORE: 10
ADLS_ACUITY_SCORE: 19
ADLS_ACUITY_SCORE: 19
ADLS_ACUITY_SCORE: 11
ADLS_ACUITY_SCORE: 19
PREVIOUS_RESPONSIBILITIES: MEAL PREP;HOUSEKEEPING;SHOPPING;MEDICATION MANAGEMENT;FINANCES;DRIVING
ADLS_ACUITY_SCORE: 10

## 2018-12-06 NOTE — PLAN OF CARE
Problem: Patient Care Overview  Goal: Plan of Care/Patient Progress Review  Discharge Planner OT   Patient plan for discharge: Home with assist from son and spouse  Current status: Pt required min A for bed mobility supine > sit EOB to achieve upright sitting position. Pt completed sit > stand from EOB x2 trials with SBA. Pt ambulated 400+ ft in hallway with CGA/SBA. Pt ambulated up/down 4 stairs with use of rail and CGA (pt reports spouse/son are always present on stairs with her). Pt completed sit > supine with SBA. Limited by ongoing pain in RUE.   Barriers to return to prior living situation: None anticipated  Recommendations for discharge: Home with assist for I/ADLs (driving, dressing, bathing, home management tasks)   Rationale for recommendations: Anticipate with continued skilled OT services, pt will progress to meet goals to safely return home. Pt has good support system in place and is able to have 24/7 assist as needed. Pt interested in resources for cleaning/homemaking assist.        Entered by: Joyce Rashid 12/06/2018 3:55 PM

## 2018-12-06 NOTE — PROGRESS NOTES
[facility]  Hospitalist Progress Note  Teagan Saucedo MD 12/06/2018    Reason for Stay (Diagnosis): right TSA         Assessment and Plan:      Lisa Johnston is a 78 year old female with a history of hypertension, psoriatic arthritis, on prednisone 10 mg a day for the past month due to refractory back pain who was admitted for elective right total shoulder replacement for refractory arthritis.  Her brief hospitalization has been complicated by urinary retention in the setting of oxycodone     1. 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     2.  Nasal irritation: resolved     3.  Steroid use: She currently is on prednisone 10 mg a day and has been so for the past month.  She states it is in the setting of some back/hip pain.  She is not on a taper.  We will continue it at her outpatient dosing - she had no e/o relative adrenal insufficiency     4.  Hypertension Home regimen is losartan 25 mg p.o. twice daily, metoprolol 25 mg p.o. twice daily, and spironolactone 25 mg a day.  Resumed all with staggered parameters in place    5.  Urinary retention:  Likely due to oxycontin, oxycodone.  Tramadol only minimally beneficial.  Will discontinue oxycontin, cont apap tid, start tramadol 50 mg bid, and decrease oxy 5 mg q 4 prn.  Place augustine for overnight and discontinue it first think in am in anticipation of discharge home        Code status: full  Prophylaxis: PCDs  Disposition Plan   Expected discharge in 1-2 days to prior living arrangement once independent in ADLs after recent TSA.          Interval History (Subjective):      Still with left shoulder pain.  But had a terrible overnight because of urinary retention.  She's on oxycontin now due to refractory pain, but I think that's also causing her retention problem.  Otherwise doing well no cp or sob, no n/v/d po intake is fair                   "Physical Exam:      Last Vital Signs:  /58  Pulse 78  Temp 96.9  F (36.1  C) (Oral)  Resp 18  Ht 1.6 m (5' 3\")  Wt 88.9 kg (196 lb)  SpO2 91%  BMI 34.72 kg/m2    Pleasant nad looks stated age head nc/at sclera clear lungs ctab nl effort rrr no mrg no le edema skin w/d no c/c abd s/nt/nd alert and oriented affect appropriate orta            Medications:      All current medications were reviewed with changes reflected in problem list.         Data:      All new lab and imaging data was reviewed.   Labs:    Recent Labs  Lab 12/06/18  0712      POTASSIUM 4.6   CHLORIDE 102   CO2 27   ANIONGAP 5   *   BUN 18   CR 1.01   GFRESTIMATED 53*   GFRESTBLACK 64   CHEPE 8.2*       Recent Labs  Lab 12/06/18  0712 12/05/18  1449   HGB 8.7*  --    PLT  --  340      Imaging:       "

## 2018-12-06 NOTE — PLAN OF CARE
Problem: Patient Care Overview  Goal: Plan of Care/Patient Progress Review  OT: Evaluation and treatment initiated. Pt lives in a rambler style home with her spouse and adult son. At baseline pt is independent in all ADLs and most IADLs, however since her fall in November 2018, pt has required increased assist for bathing tasks and dressing. Pt is s/p right total shoulder reverse arthroplasty on 12/5/18.  Discharge Planner OT   Patient plan for discharge: Home with assist from son and spouse   Current status:  Pt went from supine to sit EOB with SBA. Pt went from sit<>Stand with SBA. Pt ambulated to the bathroom with SBA and transferred to/from the toilet with SBA. While seated/standing pt completed lower body dressing including don/doff underwear with moderate assist. Pt completed upper body dressing task including don/doff sling with moderate assist. Pt transferred to the bedside chair with SBA. Pt limited by pain during session.   Barriers to return to prior living situation: Current level of assist; pain  Recommendations for discharge: Home with assist for I/ADLs (driving, dressing, bathing, home management tasks) and OP OT or PT for RUE strengthening   Rationale for recommendations: Pt has a strong support system at home that can provide 24/7 A/supervision.  Recommend OP OT or PT for RUE strengthening        Entered by: Ruba Vargas 12/06/2018 9:28 AM

## 2018-12-06 NOTE — OP NOTE
Procedure Date: 12/05/2018      PREOPERATIVE DIAGNOSIS:  Right proximal humerus fracture.      POSTOPERATIVE DIAGNOSIS:  Right proximal humerus fracture.      PROCEDURES:     1.  Right reverse shoulder arthroplasty.   2.  Biceps tenodesis.      SURGEON:  Mamadou Chen MD      FIRST ASSISTANT:  SOFIA Richey      A skilled first assistant was necessary for patient positioning help with retractor placement, help with positioning of the arm, help with reduction and placement of the implants as well as closure and patient transfer.      ESTIMATED BLOOD LOSS:  350 mL.      ANESTHESIA:  General, interscalene block.      COMPLICATIONS:  None apparent.      IMPLANTS:   1.  Tornier Aequalis Perform Reversed eccentric glenosphere size 36 with a +2 offset.   2.  Tornier Aequalis Perform Reversed standard baseplate size 25.     3.  Tornier Flex shoulder system Reversed insert poly size +6.     4.  Tornier Flex shoulder system Reversed tray low eccentricity was a +0 thickness.     5.  Tornier long PTC humeral stem, size 3B.      INDICATIONS:  The patient is an 78-year-old right-hand dominant female who had a trip and fall on 11/22 when she fell into a wall accidentally with her right shoulder.  She was seen at an outside facility where she was recommended for nonoperative management with a sling.  She was not happy with that given she was having significant pain and difficulty with any sort of motion.  Therefore, she presented to our urgent care where it appears she has displaced a proximal humerus fracture and was referred to me for evaluation.  After discussion of the treatment options with the patient including nonoperative measures as well as open reduction internal fixation as well as reverse shoulder arthroplasty.  We decided that the best way to move forward would be a reverse shoulder arthroplasty.  We explained to her that there are risks of the surgery including risk of bleeding, infection, damage to  neurovascular structures, risk of fracture propagation, stiffness as well as need for future revision surgery.  We also discussed nonoperative measures and explained to her that likely this will heal; however, it will heal in a malunited area and the medial spike of the humeral shaft may be giving her issues in the future.  Therefore, she chose surgery, which is reasonable.      DESCRIPTION OF PROCEDURE:  On the date of the procedure, the patient was met in the preoperative area by the surgeon and anesthesia team.  Her right shoulder was marked by the operative surgeon, and informed consent was obtained.  Risks and benefits of the surgery were once again discussed with the patient including risk of bleeding, infection, damage to neurovascular structures, risk of fracture propagation, risk of need for future revision as well as risk of malunion of the tuberosities.  She agreed to proceed.  Our anesthesia colleagues then performed an interscalene block, and she was brought to the operating room and placed on the operating room table in supine position and general anesthesia was administered.  She was then placed in the beachchair position using the operating room table and her body was shifted off the table in order to have her shoulder off the table.  Her head was secured into place, and her right upper extremity was prepped and draped in the usual sterile fashion.  Preoperative antibiotics were given.  Preoperative tranexamic acid was given.  A preoperative timeout was performed.  We began the procedure by making a standard incision over the deltopectoral interval.  Sharp dissection was carried down through the skin as well as the subcutaneous tissue.  We then used scissors dissection in order to find the deltopectoral interval and protected the cephalic vein throughout the procedure.  Unfortunately, it did bleed toward the end of the case, therefore, had to be coagulated.  Next, we used a Ramirez elevator in order to  break up the adhesions in the subdeltoid and subacromial space.  Appropriate retractors were placed.  We then cut the coracoacromial ligament over the lateral aspect of the coracoid and developed the lateral aspect of the conjoined tendon.  We then tagged our subscapularis using #2 Ethibonds and found the bicipital groove.  The superior 1 cm of the pectoralis major tendon was released, and the biceps was tenodesed to the superior aspect of the pec major tendon.  We did visualize the fracture just proximal to the pec major tendon and it was medialized and there was a sharp medial spike.  The fracture did extend proximally along the greater tuberosity, and the greater tuberosity was quite comminuted.  We next peeled off the subscapularis off the lesser tuberosity after it was tagged, and developed it by removing both the inferior as well as the medial glenohumeral ligaments and tucked it into the subscapularis fossa.  We then attempted to reduce the proximal fragment as well as the distal fragment using a combination of K-wires as well as a clamp; however, we were unable to get good fixation.  We then placed a K-wire into the proximal fragment and used a joystick in order to externally rotate it and get it into a good position.  We then performed an anatomic head cut using an oscillating saw.  However, afterwards our proximal humerus fragment crumbled.  We did place sutures through the supraspinatus as well as the infraspinatus in order to get better control of the greater tuberosity, and we decided the best way to move forward instead of trying to reduce the proximal fragment into the shaft would be to develop the greater tuberosity alone.  So the lesser tuberosity as well as the body of the proximal humerus were removed using a rongeur, and the greater tuberosity was then tucked away into the posterior aspect of the shoulder.  Excess bone was removed, and we turned our attention to the glenoid.  The labrum both  anteriorly, superiorly as well as inferiorly were removed in circumferential fashion.  Next, we curetted off the cartilage and using a 10-degree tilted guide placed a guidepin through the glenoid in a standard fashion.  This was then overreamed in appropriate position and overdrilled and then we placed a standard Perform Reversed baseplate into position.  Four screws were then utilized circumferentially in order to get good compression as well as rotational stability.  We then placed a size 36 +2 offset glenosphere onto the base and impacted it into position.        Next, we turned our attention to the humerus.  We tested several different impactors, and a size 3 impactor had excellent rotational stability in the humeral shaft.  This was left into place and then it was trialed using a  low eccentricity baseplate and a +6 poly.  It was reduced without any issues and had excellent stability.  We then attempted to reduce the greater tuberosity and were able to.  We double checked under x-rays, and the tuberosity had adequate reduction.  Therefore, the trial implants were removed.  We then placed 2 holes through the humeral shaft proximally to tag the greater tuberosity and prevent it from sliding superior.  These were placed using FiberWires in NiceLoop fashion.  We also placed 1 circumferential around the world FiberWire that will go around the implant through the subscapularis as well as through the greater tuberosity.        Next, the wound was copiously irrigated and the true implant was impacted into place in 30 degrees of version.  Low eccentricity baseplate was placed, and we noticed that there was a small crack along the proximal aspect of the shaft.  Therefore, we used another FiberWire in NiceLoop fashion in order to perform a cerclage wire at the crack as well as just distal to the crack to prevent future propagation.  We then placed a poly into position and reduced the arm, had excellent range of motion as  well as stability.        We then proceeded with reduction of the greater tuberosity.  Prior to that, we did bone graft using the humeral head as well as the remnant lesser tuberosity.  These were placed over the lateral aspect of the stem, and the greater tuberosity was then reduced to the stem as well as to the shaft.  We then also reduced it to the subscapularis and had excellent rotational stability.  The rotator interval was open, and we then turned our attention to closure.  Prior to that, final x-rays were taken that demonstrated an appropriate reduction of the tuberosity and the hardware was in appropriate position.        Next, we closed the deltopectoral interval loosely using #1 Vicryl followed by 2-0 Vicryl for deep dermis and staples for the skin.  Sterile dressing was applied.  An UltraSling was placed, and the patient was awakened from anesthesia and brought to the PACU for recovery.         EM LAMAS MD             D: 2018   T: 2018   MT: HUNTER      Name:     LIVIER MOROCHO   MRN:      [id number]        Account:        [id number]   :      1940           Procedure Date: 2018      Document: [id number]

## 2018-12-06 NOTE — PLAN OF CARE
Problem: Patient Care Overview  Goal: Plan of Care/Patient Progress Review  Outcome: Improving  RN - VSS. Pt c/o intermittent R shoulder pain - denying throughout most the evening - however stating increase in pain later. Receiving adequate relief with PO medications. R shoulder dressing CDI. CMS intact. Pt up with SBA in room. R arm remains in sling. Tolerating PO intake without nausea. Pt ambulated in room x2. Was able to void x2  however having difficulty with urinary retention (PVR >400ml). Pt ultimately straight cath for 2nd time since surgery. Plan to start therapy tomorrow - hopeful to discharge back to home in upcoming days.

## 2018-12-06 NOTE — PLAN OF CARE
Problem: Patient Care Overview  Goal: Plan of Care/Patient Progress Review  Outcome: Improving  Day RN  VS monitored, up w/A1 and sling and RADHA, drsg C/D/I, CMS+, ecchymosis to R shoulder/side d/t fall at home prior to admission, voiding w/retention, indwelling augustine inserted and to be removed tomorrow early AM, pain meds changed around by Dr Saucedo, scheduled Tramadol BID, decreased PRN Oxycodone for breakthrough/scheduled Tylenol, plan is home tomorrow with spouse and son, will cont to monitor.

## 2018-12-06 NOTE — PLAN OF CARE
Problem: Patient Care Overview  Goal: Plan of Care/Patient Progress Review  Outcome: Improving  Vital signs stable. Rating pain at 5, controlled with Oxycodone 5mg. Lung sounds clear, instructed regarding incentive spirometer. Assisted to bedside commode X3, voiding without difficulty. CMS intact right upper extremity, ice to shoulder. Tolerating clear liquids without nausea.

## 2018-12-06 NOTE — PLAN OF CARE
Problem: Patient Care Overview  Goal: Plan of Care/Patient Progress Review    Discharge Planner PT   Patient plan for discharge: Home with assist from son and spouse  Current status: Pt is POD #1 s/p R reverse TSA. Per OT, pt is ambulatory and completing all functional mobility skills CGA/SBA.  Barriers to return to prior living situation: none anticipated  Recommendations for discharge: Per OT: Home with assist for I/ADLs (driving, dressing, bathing, home management tasks)   Rationale for recommendations: No skilled IP PT consult needed, family feels comfortable this level of support for pt at time of discharge. Anticipate continued improvement in mobility status with time and walking program.  IP PT signing off.       Entered by: Charles Castro 12/06/2018 4:02 PM

## 2018-12-06 NOTE — PROGRESS NOTES
12/06/18 0830   Quick Adds   Type of Visit Initial Occupational Therapy Evaluation   Living Environment   Lives With spouse;child(krystal), adult   Living Arrangements house  (rambler style home )   Home Accessibility bed and bath on same level;stairs to enter home;stairs within home  (walk in shower )   Number of Stairs to Enter Home 2   Number of Stairs Within Home (satirs to the basement, pt does not use )   Transportation Available car;family or friend will provide   Living Environment Comment spouse and adult son available to assist to 24/7   Self-Care   Dominant Hand right   Usual Activity Tolerance good   Current Activity Tolerance moderate   Regular Exercise no   Equipment Currently Used at Home raised toilet;shower chair;cane, straight   Activity/Exercise/Self-Care Comment Pt uses a cane outside of the home. At baseline pt is independent in all ADLs and most IADLs. Since her fall in November 2018, she has been having more assistance with bathing and using a shower chair, dressing.    Functional Level Prior   Ambulation 0-->independent   Transferring 0-->independent   Toileting 0-->independent   Bathing 0-->independent   Dressing 0-->independent   Fall history within last six months yes   Number of times patient has fallen within last six months 1   General Information   Onset of Illness/Injury or Date of Surgery - Date 12/05/18   Referring Physician Mamadou Chen MD   Patient/Family Goals Statement home    Additional Occupational Profile Info/Pertinent History of Current Problem Lisa Johnston is a 78 year old female with a history of hypertension, psoriatic arthritis, on prednisone 10 mg a day for the past month due to refractory back pain who was admitted for elective right total shoulder replacement for refractory arthritis.    Precautions/Limitations fall precautions   Weight-Bearing Status - RUE nonweight-bearing   Cognitive Status Examination   Orientation orientation to person, place and time   Level  of Consciousness alert   Visual Perception   Visual Perception Wears glasses   Sensory Examination   Sensory Quick Adds No deficits were identified   Pain Assessment   Patient Currently in Pain Yes, see Vital Sign flowsheet   Mobility   Bed Mobility Bed mobility skill: Sit to supine;Bed mobility skill: Supine to sit   Bed Mobility Skill: Supine to Sit   Level of [place]: Supine/Sit stand-by assist   Transfer Skills   Transfer Transfer Safety Analysis Bed/Chair;Transfer Skill: Stand to Sit;Transfer Safety Analysis Sit/Stand   Transfer Skill: Bed to Chair/Chair to Bed   Level of [place]: Bed to Chair stand-by assist   Transfer Skill: Sit to Stand   Level of [place]: Sit/Stand stand-by assist   Toilet Transfer   Toilet Transfer Toilet Transfer Skill;Toilet Transfer Safety Analysis   Transfer Skill: Toilet Transfer   Level of [place]: Toilet stand-by assist   Upper Body Dressing   Level of [place]: Dress Upper Body moderate assist (50% patients effort)   Physical Assist/Nonphysical Assist: Dress Upper Body verbal cues;set-up required   Lower Body Dressing   Level of [place]: Dress Lower Body moderate assist (50% patients effort)   Physical Assist/Nonphysical Assist: Dress Lower Body set-up required;verbal cues   Instrumental Activities of Daily Living (IADL)   Previous Responsibilities meal prep;housekeeping;shopping;medication management;finances;driving   General Therapy Interventions   Planned Therapy Interventions ADL retraining;IADL retraining;transfer training   Clinical Impression   Criteria for Skilled Therapeutic Interventions Met yes, treatment indicated   OT Diagnosis Decreased endurance for I/ADls    Influenced by the following impairments Decreased endurance for I/ADls    Assessment of Occupational Performance 1-3 Performance Deficits   Identified Performance Deficits Decreased endurance for I/ADls  (Dressing, bathing, toileting)   Clinical Decision Making (Complexity) Low  "complexity   Therapy Frequency 2 times/day   Predicted Duration of Therapy Intervention (days/wks) 4 days   Anticipated Discharge Disposition Home with Outpatient Therapy;Home with Assist   Risks and Benefits of Treatment have been explained. Yes   Patient, Family & other staff in agreement with plan of care Yes   [facility] TM \"6 Clicks\"   2016, Trustees of [facility], under license to [place].  All rights reserved.   6 Clicks Short Forms Daily Activity Inpatient Short Form   [facility]  \"6 Clicks\" Daily Activity Inpatient Short Form   1. Putting on and taking off regular lower body clothing? 2 - A Lot   2. Bathing (including washing, rinsing, drying)? 2 - A Lot   3. Toileting, which includes using toilet, bedpan or urinal? 3 - A Little   4. Putting on and taking off regular upper body clothing? 2 - A Lot   5. Taking care of personal grooming such as brushing teeth? 3 - A Little   6. Eating meals? 3 - A Little   Daily Activity Raw Score (Score out of 24.Lower scores equate to lower levels of function) 15   Total Evaluation Time   Total Evaluation Time (Minutes) 8     "

## 2018-12-06 NOTE — PROGRESS NOTES
Orthopedic Surgery  Lisa Johnston  2018  Admit Date:  2018  POD # 1  S/P right RTSA    Patient resting comfortably in bed.    Pain controlled.  Tolerating oral intake.    Patient states reports she was only able to urinate 100 mLs and bladder scan showed 800 mLs post void. She denies any history of urinary issues but does note she has IBS-D and will get UTIs.   Denies nausea or vomiting.  Denies chest pain or shortness of breath.  No events overnight.      Alert and orient to person, place, and time.  Vital Sign Ranges  Temperature Temp  Av  F (36.1  C)  Min: 96  F (35.6  C)  Max: 97.5  F (36.4  C)   Blood pressure Systolic (24hrs), Av , Min:115 , Max:157        Diastolic (24hrs), Av, Min:48, Max:76      Pulse No Data Recorded   Respirations Resp  Avg: 15.4  Min: 8  Max: 24   Pulse oximetry SpO2  Av %  Min: 87 %  Max: 100 %       Sling in place  Aquacel to right shoulder C/D/I.  Sensation intact in upper arm over biceps as well as in hand r/m/u nerve distribution  Able to abduct and oppose left thumb  +Radial pulse  +cap refill      Labs:  Recent Labs   Lab Test  18   0712  18   0750   POTASSIUM  4.6  4.3     Recent Labs   Lab Test  18   0712  18   0724  18   0830   HGB  8.7*  9.5*  9.9*     No results for input(s): INR in the last 90589 hours.  Recent Labs   Lab Test  18   1449   PLT  340       A/P  1. Plan   Continue Lovenox for DVT prophylaxis.     Mobilize with PT/OT    NWB to RUE   Continue current pain regiment.   Urinary retention management per primary team.     2. Disposition   Anticipate d/c to home pending medical clearance, likely tomorrow. Okay from an orthopedic standpoint to discharge.    Priyanka Grimes PA-C

## 2018-12-06 NOTE — PLAN OF CARE
Problem: Patient Care Overview  Goal: Plan of Care/Patient Progress Review  OT: Attempted session, pt declined session.  Pt reporting that she just returned to bed and reporting fatigue.

## 2018-12-07 ENCOUNTER — APPOINTMENT (OUTPATIENT)
Dept: OCCUPATIONAL THERAPY | Facility: CLINIC | Age: 78
DRG: 483 | End: 2018-12-07
Attending: ORTHOPAEDIC SURGERY
Payer: MEDICARE

## 2018-12-07 VITALS
RESPIRATION RATE: 16 BRPM | WEIGHT: 196 LBS | TEMPERATURE: 98.7 F | OXYGEN SATURATION: 94 % | SYSTOLIC BLOOD PRESSURE: 140 MMHG | HEIGHT: 63 IN | HEART RATE: 91 BPM | BODY MASS INDEX: 34.73 KG/M2 | DIASTOLIC BLOOD PRESSURE: 49 MMHG

## 2018-12-07 LAB
ANION GAP SERPL CALCULATED.3IONS-SCNC: 5 MMOL/L (ref 3–14)
BACTERIA SPEC CULT: NO GROWTH
BASOPHILS # BLD AUTO: 0 10E9/L (ref 0–0.2)
BASOPHILS NFR BLD AUTO: 0 %
BUN SERPL-MCNC: 14 MG/DL (ref 7–30)
CALCIUM SERPL-MCNC: 8.7 MG/DL (ref 8.5–10.1)
CHLORIDE SERPL-SCNC: 101 MMOL/L (ref 94–109)
CO2 SERPL-SCNC: 28 MMOL/L (ref 20–32)
CREAT SERPL-MCNC: 1 MG/DL (ref 0.52–1.04)
DIFFERENTIAL METHOD BLD: ABNORMAL
EOSINOPHIL # BLD AUTO: 0.3 10E9/L (ref 0–0.7)
EOSINOPHIL NFR BLD AUTO: 2 %
ERYTHROCYTE [DISTWIDTH] IN BLOOD BY AUTOMATED COUNT: 13.8 % (ref 10–15)
GFR SERPL CREATININE-BSD FRML MDRD: 53 ML/MIN/1.7M2
GLUCOSE SERPL-MCNC: 112 MG/DL (ref 70–99)
HCT VFR BLD AUTO: 28 % (ref 35–47)
HGB BLD-MCNC: 9.2 G/DL (ref 11.7–15.7)
INTERPRETATION ECG - MUSE: NORMAL
LYMPHOCYTES # BLD AUTO: 4.6 10E9/L (ref 0.8–5.3)
LYMPHOCYTES NFR BLD AUTO: 32 %
Lab: NORMAL
MCH RBC QN AUTO: 31.6 PG (ref 26.5–33)
MCHC RBC AUTO-ENTMCNC: 32.9 G/DL (ref 31.5–36.5)
MCV RBC AUTO: 96 FL (ref 78–100)
MONOCYTES # BLD AUTO: 0.1 10E9/L (ref 0–1.3)
MONOCYTES NFR BLD AUTO: 1 %
NEUTROPHILS # BLD AUTO: 9.4 10E9/L (ref 1.6–8.3)
NEUTROPHILS NFR BLD AUTO: 65 %
PLATELET # BLD AUTO: 319 10E9/L (ref 150–450)
PLATELET # BLD EST: ABNORMAL 10*3/UL
POTASSIUM SERPL-SCNC: 3.9 MMOL/L (ref 3.4–5.3)
RBC # BLD AUTO: 2.91 10E12/L (ref 3.8–5.2)
RBC MORPH BLD: ABNORMAL
SODIUM SERPL-SCNC: 134 MMOL/L (ref 133–144)
SPECIMEN SOURCE: NORMAL
VARIANT LYMPHS BLD QL SMEAR: PRESENT
WBC # BLD AUTO: 14.5 10E9/L (ref 4–11)

## 2018-12-07 PROCEDURE — 25000125 ZZHC RX 250: Performed by: INTERNAL MEDICINE

## 2018-12-07 PROCEDURE — 97110 THERAPEUTIC EXERCISES: CPT | Mod: GO | Performed by: REHABILITATION PRACTITIONER

## 2018-12-07 PROCEDURE — 97535 SELF CARE MNGMENT TRAINING: CPT | Mod: GO | Performed by: REHABILITATION PRACTITIONER

## 2018-12-07 PROCEDURE — A9270 NON-COVERED ITEM OR SERVICE: HCPCS | Mod: GY | Performed by: ORTHOPAEDIC SURGERY

## 2018-12-07 PROCEDURE — 25000132 ZZH RX MED GY IP 250 OP 250 PS 637: Mod: GY | Performed by: INTERNAL MEDICINE

## 2018-12-07 PROCEDURE — 40000133 ZZH STATISTIC OT WARD VISIT: Performed by: REHABILITATION PRACTITIONER

## 2018-12-07 PROCEDURE — 25000132 ZZH RX MED GY IP 250 OP 250 PS 637: Mod: GY | Performed by: ORTHOPAEDIC SURGERY

## 2018-12-07 PROCEDURE — 36415 COLL VENOUS BLD VENIPUNCTURE: CPT | Performed by: INTERNAL MEDICINE

## 2018-12-07 PROCEDURE — 85025 COMPLETE CBC W/AUTO DIFF WBC: CPT | Performed by: INTERNAL MEDICINE

## 2018-12-07 PROCEDURE — 99231 SBSQ HOSP IP/OBS SF/LOW 25: CPT | Performed by: INTERNAL MEDICINE

## 2018-12-07 PROCEDURE — A9270 NON-COVERED ITEM OR SERVICE: HCPCS | Mod: GY | Performed by: INTERNAL MEDICINE

## 2018-12-07 PROCEDURE — 25000128 H RX IP 250 OP 636: Performed by: ORTHOPAEDIC SURGERY

## 2018-12-07 PROCEDURE — 99207 ZZC CDG-CODE CATEGORY CHANGED: CPT | Performed by: INTERNAL MEDICINE

## 2018-12-07 PROCEDURE — 80048 BASIC METABOLIC PNL TOTAL CA: CPT | Performed by: INTERNAL MEDICINE

## 2018-12-07 RX ORDER — CYCLOBENZAPRINE HCL 5 MG
5 TABLET ORAL 3 TIMES DAILY PRN
Qty: 12 TABLET | Refills: 0 | Status: SHIPPED | OUTPATIENT
Start: 2018-12-07 | End: 2018-12-14

## 2018-12-07 RX ORDER — SPIRONOLACTONE 25 MG/1
25 TABLET ORAL DAILY
Qty: 30 TABLET
Start: 2018-12-07 | End: 2022-04-29

## 2018-12-07 RX ORDER — LOSARTAN POTASSIUM 25 MG/1
25 TABLET ORAL 2 TIMES DAILY
Qty: 30 TABLET | Status: ON HOLD
Start: 2018-12-07 | End: 2021-12-01

## 2018-12-07 RX ORDER — CYCLOBENZAPRINE HCL 5 MG
5 TABLET ORAL 3 TIMES DAILY
Status: DISCONTINUED | OUTPATIENT
Start: 2018-12-07 | End: 2018-12-07 | Stop reason: HOSPADM

## 2018-12-07 RX ADMIN — CETIRIZINE HYDROCHLORIDE 5 MG: 5 TABLET ORAL at 08:05

## 2018-12-07 RX ADMIN — ENOXAPARIN SODIUM 40 MG: 40 INJECTION SUBCUTANEOUS at 06:16

## 2018-12-07 RX ADMIN — GABAPENTIN 100 MG: 100 CAPSULE ORAL at 15:03

## 2018-12-07 RX ADMIN — TRAMADOL HYDROCHLORIDE 50 MG: 50 TABLET, COATED ORAL at 08:05

## 2018-12-07 RX ADMIN — GABAPENTIN 100 MG: 100 CAPSULE ORAL at 08:05

## 2018-12-07 RX ADMIN — METOPROLOL SUCCINATE 25 MG: 25 TABLET, EXTENDED RELEASE ORAL at 12:47

## 2018-12-07 RX ADMIN — TRAMADOL HYDROCHLORIDE 50 MG: 50 TABLET, COATED ORAL at 15:07

## 2018-12-07 RX ADMIN — ACETAMINOPHEN 975 MG: 325 TABLET, FILM COATED ORAL at 00:08

## 2018-12-07 RX ADMIN — ACETAMINOPHEN 975 MG: 325 TABLET, FILM COATED ORAL at 08:05

## 2018-12-07 RX ADMIN — SENNOSIDES AND DOCUSATE SODIUM 2 TABLET: 8.6; 5 TABLET ORAL at 08:05

## 2018-12-07 RX ADMIN — PREDNISONE 10 MG: 5 TABLET ORAL at 08:05

## 2018-12-07 RX ADMIN — CYCLOBENZAPRINE HYDROCHLORIDE 5 MG: 5 TABLET, FILM COATED ORAL at 12:46

## 2018-12-07 RX ADMIN — ACETAMINOPHEN 975 MG: 325 TABLET, FILM COATED ORAL at 16:24

## 2018-12-07 ASSESSMENT — ACTIVITIES OF DAILY LIVING (ADL)
ADLS_ACUITY_SCORE: 10

## 2018-12-07 NOTE — PROGRESS NOTES
"Care Transitions Team: Following for CC, discharge planning, and disposition.      Per OT Jeannie pt. has mentioned \"someone\" from the Clinic came in and said \"I should have a home care RN for Bladder retention issues.\"   This pt is being followed by BP, Monika Thomas RN BPCI  [facility]   This information was provided to Monika via phone call, she followed up with pt and confirmed that is was not the case and per Monika pt will discharge home with NO home care services.     No further needs     "

## 2018-12-07 NOTE — PLAN OF CARE
Problem: Patient Care Overview  Goal: Plan of Care/Patient Progress Review  Outcome: Adequate for Discharge Date Met: 12/07/18  Pt discharged to home with spouse. Discharge instructions given. Sent discharge medications with. Pt and spouse had no further question.

## 2018-12-07 NOTE — PLAN OF CARE
Problem: Shoulder Arthroplasty (Adult)  Goal: Signs and Symptoms of Listed Potential Problems Will be Absent, Minimized or Managed (Shoulder Arthroplasty)  Signs and symptoms of listed potential problems will be absent, minimized or managed by discharge/transition of care (reference Shoulder Arthroplasty (Adult) CPG).   A&Ox4, but seems forgetful of medications/times. VSS. A1 with sling. Tolerating a regular diet ok. Taking scheduled tramadol for pain management which is effective. Voided 100, bladder scan for 369, void 200. Passing gas. Unsure if she is leaving today, pt seems hesitant and worried about bladder. Per ortho ok to discharge later today.

## 2018-12-07 NOTE — CONSULTS
Care Transitions Team: Following for CC, discharge planning, and disposition.      Received information that pt is being followed by [facility] Monika Thomas RN   [facility]  [facility] [place] T: [phone].     Per Monika, plan is for pt to return home with support of Spouse.   There is not an official care plan provided this was verbally shared by Monika Thomas RN.     CM following in collaboration with [facility]

## 2018-12-07 NOTE — PLAN OF CARE
Problem: Patient Care Overview  Goal: Plan of Care/Patient Progress Review  Outcome: Improving  Pt A&Ox4. Up with SBA,walker, Sling, gb. Dressing CDI. Bruising to shoulder. Pain controlled with scheduled tylenol and tramadol. CMS intact. Rehman catheter patent with adequate output. Bowels active, passing flatus. Plans to discharge to home tomorrow with assist from spouse and son. VSS

## 2018-12-07 NOTE — DISCHARGE SUMMARY
Discharge Summary  Hospitalist Service    Lisa Johnston MRN# [id number]   YOB: 1940 Age: 78 year old     Date of Admission:  12/5/2018  Date of Discharge:  12/7/2018  Admitting Physician:  Mamadou Chen MD  Discharge Physician: Teagan Saucedo MD  Discharging Service: Hospitalist Service     Primary Provider: Wilfred Medina  Primary Care Physician Phone Number: [phone]         Discharge Diagnoses/Problem Oriented Hospital Course (Providers):    Lisa Johnston was admitted on 12/5/2018 by Mamadou Chen MD and I would refer you to their history and physical.  The following problems were addressed during her hospitalization:      Shoulder pain after recent fall  Neck stiffness after recent fall  Steroid use  htn  Urinary retention         Code Status:      Full Code        Brief Hospital Stay Summary Sent Home With Patient in AVS:        Reason for your hospital stay       S/p right reverse total shoulder arthroscopy                  Lisa Johnston is a 78 year old female with a history of hypertension, psoriatic arthritis, on prednisone 10 mg a day for the past month due to refractory back pain who was admitted for elective right total shoulder replacement for refractory arthritis worsened after fall against a wall 2 weeks pta.  Her brief hospitalization has been complicated by urinary retention in the setting of oxycodone, and on day of discharge with significant neck stiffness      1. 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      2.  Nasal irritation: resolved      3.  Steroid use: She currently is on prednisone 10 mg a day and has been so for the past month.  She states it is in the setting of some back/hip pain.  She is not on a taper.  We will continue it at her outpatient dosing - she had no e/o relative adrenal insufficiency      4.  Hypertension Home regimen is losartan 25 mg  p.o. twice daily, metoprolol 25 mg p.o. twice daily, and spironolactone 25 mg a day.  Resumed all with staggered parameters in place-only tolerating BB at this standpoint, therefore have asked her to hold her ARB/spirnolactone until re-evaluated by her primary MD     5.  Urinary retention:  Likely due to oxycontin, oxycodone.  Tramadol only minimally beneficial.  Will discontinue oxycontin, cont apap tid, start tramadol 50 mg bid, and decrease oxy 5 mg q 4 prn. Required augustine for 24 hours. Significantly improved since off oxycontin.  Ok to discharge without augustine     6.  Neck stiffness:  Has had some neck stiffness since the fall 2 weeks ago - where she initially hurt her shoulder.  She denies any weakness/numbness or tingling, really just feels like muscular spasms.  Her symptoms significantly improved with prn cyclobenzaprine.  I have agreed to give her a limited prescription but she is to f/u with her primary MD next week.     Code:  Full  PPX:  enox while in hosp            Important Results:      As noted below         Pending Results:        Unresulted Labs Ordered in the Past 30 Days of this Admission     Date and Time Order Name Status Description    12/5/2018 0825 Anaerobic bacterial culture Preliminary             Discharge Instructions and Follow-Up:      Follow-up Appointments     Follow-up and recommended labs and tests        Please follow up with Dr. Chen with [facility] in 2 weeks.   Call his care coordinator Nicole at [phone] to make an appointment.      No follow up labs or test are needed.    Hold your losartan and spironolactone as your blood pressures are on the   low side, likely due to pain medications.   See your primary MD next week for recheck of BPs and neck stiffness                      Discharge Disposition:      Discharged to home         Discharge Medications:        Current Discharge Medication List      START taking these medications    Details   acetaminophen  (TYLENOL) 325 MG tablet Take 2 tablets (650 mg) by mouth every 4 hours as needed for pain  Qty: 100 tablet, Refills: 0    Associated Diagnoses: S/P reverse total shoulder arthroplasty, right      aspirin (ASA) 325 MG EC tablet Take 1 tablet (325 mg) by mouth 2 times daily  Qty: 60 tablet, Refills: 0    Associated Diagnoses: S/P reverse total shoulder arthroplasty, right      cyclobenzaprine (FLEXERIL) 5 MG tablet Take 1 tablet (5 mg) by mouth 3 times daily as needed for muscle spasms  Qty: 12 tablet, Refills: 0    Associated Diagnoses: Spasm of muscle      oxyCODONE (ROXICODONE) 5 MG tablet Take 1-2 tablets (5-10 mg) by mouth every 4 hours as needed for severe pain  Qty: 30 tablet, Refills: 0    Associated Diagnoses: S/P reverse total shoulder arthroplasty, right      senna-docusate (SENOKOT-S/PERICOLACE) 8.6-50 MG tablet Take 2 tablets by mouth 2 times daily  Qty: 100 tablet, Refills: 0    Associated Diagnoses: S/P reverse total shoulder arthroplasty, right         CONTINUE these medications which have CHANGED    Details   traMADol (ULTRAM) 50 MG tablet Take 1 tablet (50 mg) by mouth every 6 hours as needed for moderate pain  Qty: 20 tablet, Refills: 0    Associated Diagnoses: S/P reverse total shoulder arthroplasty, right         CONTINUE these medications which have NOT CHANGED    Details   Alpha-Lipoic Acid (LIPOIC ACID PO) Take 300 mg by mouth 2 times daily      Ascorbic Acid (VITAMIN C PO) Take 1,000 mg by mouth 3 times daily       B Complex-C (SUPER B COMPLEX PO) Take 1 tablet by mouth daily       Barberry-Oreg Grape-Goldenseal (BERBERINE COMPLEX PO) Take 500 mg by mouth 2 times daily       Cetirizine HCl (KLS ALLER-ISIDRA PO) Take 10 mg by mouth daily      Coenzyme Q10 (COQ-10 PO) Take 100 mg by mouth daily       FOLIC ACID PO Take 10 drops by mouth daily Super Liquid Folate      KRILL OIL PO Take 500 mg by mouth daily      LOSARTAN POTASSIUM PO Take 25 mg by mouth 2 times daily      MELATONIN PO Take 10 mg by  "mouth daily       METOPROLOL SUCCINATE ER PO Take 25 mg by mouth daily (Takes 0.5 x 50mg tablet = 25mg dose)      minoxidil (ROGAINE) 2 % external solution Apply topically 2 times daily      Multiple Vitamins-Minerals (HAIR SKIN NAILS PO) Take 2 tablets by mouth daily      PREDNISONE PO Take 10 mg by mouth daily      Probiotic Product (PROBIOTIC PO) Take 2 tablets by mouth daily       SPIRONOLACTONE PO Take 25 mg by mouth daily      TURMERIC PO Take 500 mg by mouth 2 times daily       VITAMIN D-VITAMIN K PO Take 15 drops by mouth daily      VITAMIN E PO Take 400 Units by mouth daily                Allergies:         Allergies   Allergen Reactions     Penicillins Anaphylaxis and Difficulty breathing     Adhesive Tape Blisters     Gluten Meal      Hmg-Coa-R Inhibitors Other (See Comments)     Leg pain     Levaquin [Levofloxacin]      Joint pain     Sulfa Drugs Itching     Latex Rash           Consultations This Hospital Stay:      Consultation during this admission received from hospitalist         Condition and Physical on Discharge:      Discharge condition: Stable   Vitals: Blood pressure 108/43, pulse 91, temperature 98.4  F (36.9  C), temperature source Oral, resp. rate 18, height 1.6 m (5' 3\"), weight 88.9 kg (196 lb), SpO2 97 %.     Constitutional: Pleasant nad looks < stated age head nc/at sclera clear   Lungs: ctab nl effort    Cardiovascular: rrr no mrg no le edema   Abdomen: S/nt/nd   Skin: W/d no c/c    Other: Alert and oriented affect appropriate orta         Discharge Time:      Greater than 30 minutes.        Image Results From This Hospital Stay (For Non-EPIC Providers):        Results for orders placed or performed during the hospital encounter of 12/05/18   XR Surgery DAVID Fluoro L/T 5 Min w Stills    Narrative    This exam was marked as non-reportable because it will not be read by a   radiologist or a [facility] non-radiologist provider.             XR Shoulder Right Port G/E 2 Views    Narrative    " XR SHOULDER RT PORT G/E 2 VW 12/5/2018 12:03 PM    COMPARISON: 2/4/2018    HISTORY: Arthroplasty.      Impression    IMPRESSION: Postoperative changes of RIGHT reverse total shoulder  arthroplasty. Hardware appears intact.    CRICKET CRANE MD           Most Recent Lab Results In EPIC (For Non-EPIC Providers):    Most Recent 3 CBC's:  Recent Labs   Lab Test  12/07/18   0756  12/06/18   0712  12/05/18   1449  06/02/18   0724   WBC  14.5*   --    --    --    HGB  9.2*  8.7*   --   9.5*   MCV  96   --    --    --    PLT  319   --   340   --       Most Recent 3 BMP's:  Recent Labs   Lab Test  12/07/18   0756  12/06/18   0712  12/05/18   1449   05/30/18   0750   NA  134  134   --    --    --    POTASSIUM  3.9  4.6   --    --   4.3   CHLORIDE  101  102   --    --    --    CO2  28  27   --    --    --    BUN  14  18   --    --    --    CR  1.00  1.01  0.99   < >   --    ANIONGAP  5  5   --    --    --    CHEPE  8.7  8.2*   --    --    --    GLC  112*  109*   --    --    --     < > = values in this interval not displayed.     Most Recent 6 Bacteria Isolates From Any Culture (See EPIC Reports for Culture Details):  Recent Labs   Lab Test  12/05/18   0824  06/02/18   1100  06/02/18   0730   CULT  No growth  Culture negative monitoring continues  >100,000 colonies/mL  Escherichia coli  *  Canceled, Test credited  Unsatisfactory specimen

## 2018-12-07 NOTE — PLAN OF CARE
"Problem: Patient Care Overview  Goal: Plan of Care/Patient Progress Review  Discharge Planner OT   Patient plan for discharge: home with spouse A  Current status: SBA for bed mobility with HOB slightly elevated. CGA for sit<>stand, ambulate to bathroom and complete walk in shower transfer, patient noted to use grab bars for support. Patient reporting increased pain and not very receptive to instruction or feedback to use support she will have at home as she was preoccupied with pain this am. Agreed to sit in chair to eat breakfast. Noted to be I with set up of tray and meal. Reviewed plan for pm session, encouraged patient to have spouse present to for exercise and UE dressing instruction, patient not fully receptive stating \"we are fine, he knows what to do... We've bene doing this for 2 weeks\". RN updated on increased pain reported by patient.     PM- Throughout session, patient was SBA for sit<>stand transfers, bed mobility, CGA with functional mobility in room and toilet transfers. After instruction, patient was min A with TB dressing while maintaining shoulder precautions, mod A with donning sling. Spouse was present and reports he will be able to A as needed and verbalized as well as demonstrated ability to A with dressing tasks. Patient was min A at times to help readjust clothes and fully don pants over hips on R side after toileting. Patient was SBA for pericares and hand hygiene. After instruction, patient was able to complete all exercises following shoulder surgery. Increased time needed to answer questions, address concerns and clarify as needed. Goals have been met for safe return home with spouse A as needed.   Barriers to return to prior living situation: None anticipated  Recommendations for discharge: Home with assist for I/ADLs (driving, dressing, bathing, home management tasks)   Rationale for recommendations: Anticipate with continued skilled OT services, pt will progress to meet goals to safely " return home. Pt has good support system in place and is able to have 24/7 assist as needed. Pt interested in resources for cleaning/homemaking assist.     Addm; patient has met needed goals, will discharge from OT services.        Entered by: Jeannie Saenz 12/07/2018 7:34 AM       Occupational Therapy Discharge Summary    Reason for therapy discharge:    All goals and outcomes met, no further needs identified.    Progress towards therapy goal(s). See goals on Care Plan in [facility] electronic health record for goal details.  Goals met    Therapy recommendation(s):    No further therapy is recommended.

## 2018-12-07 NOTE — PROGRESS NOTES
"S:  No acute events overnight. Pain well controlled. Doing well. She had some difficulty with voiding yesterday but is improving today    O:  /51 (BP Location: Left arm)  Pulse 91  Temp 98.4  F (36.9  C) (Oral)  Resp 18  Ht 1.6 m (5' 3\")  Wt 88.9 kg (196 lb)  SpO2 97%  BMI 34.72 kg/m2      RUE:  Sling in place  Dressing c/d/i  NVI    A/P:  78 year old female s/p R RSA on 12/5/18    - doing well  - nwb rue  - pt/ot  - pain control  - dvt prophylaxis withy lovenox while in hospital  - plan for discharge today  - f/u with me at [facility] in 2 weeks. Can call my care coordinator Nicole at [phone]                  "

## 2018-12-07 NOTE — PLAN OF CARE
Problem: Patient Care Overview  Goal: Plan of Care/Patient Progress Review  Outcome: Improving  Vital signs stable. Less painful tonight. Able to rest now that augustine placed, reports tired from last night. Lung sounds clear. CMS intact right upper extremity. Plan to remove augustine in AM.

## 2018-12-07 NOTE — PROGRESS NOTES
Monika Thomas RN   BPCI    [facility]   [facility]   [address]  [place] [address]   T: [phone]   C. [phone]   F: [phone]   E: [email or web address]  [email or web address]

## 2018-12-12 LAB
BACTERIA SPEC CULT: NORMAL
Lab: NORMAL
SPECIMEN SOURCE: NORMAL
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
## 2020-09-30 ENCOUNTER — HOSPITAL ENCOUNTER (EMERGENCY)
Facility: CLINIC | Age: 80
Discharge: HOME | End: 2020-09-30
Attending: NURSE PRACTITIONER | Admitting: NURSE PRACTITIONER
Payer: COMMERCIAL

## 2020-09-30 VITALS
RESPIRATION RATE: 18 BRPM | OXYGEN SATURATION: 100 % | SYSTOLIC BLOOD PRESSURE: 154 MMHG | HEART RATE: 62 BPM | TEMPERATURE: 98.2 F | DIASTOLIC BLOOD PRESSURE: 84 MMHG
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
## 2020-09-30 DIAGNOSIS — M62.838 MUSCLE SPASM: ICD-10-CM

## 2020-09-30 DIAGNOSIS — S16.1XXA STRAIN OF NECK MUSCLE, INITIAL ENCOUNTER: ICD-10-CM

## 2020-09-30 PROCEDURE — 99283 EMERGENCY DEPT VISIT LOW MDM: CPT

## 2020-09-30 PROCEDURE — 25000132 ZZH RX MED GY IP 250 OP 250 PS 637: Performed by: NURSE PRACTITIONER

## 2020-09-30 RX ORDER — ACETAMINOPHEN 500 MG
1000 TABLET ORAL ONCE
Status: COMPLETED | OUTPATIENT
Start: 2020-09-30 | End: 2020-09-30

## 2020-09-30 RX ORDER — LIDOCAINE 4 G/G
2 PATCH TOPICAL ONCE
Status: DISCONTINUED | OUTPATIENT
Start: 2020-09-30 | End: 2020-09-30 | Stop reason: HOSPADM

## 2020-09-30 RX ORDER — METHOCARBAMOL 500 MG/1
1000 TABLET, FILM COATED ORAL ONCE
Status: COMPLETED | OUTPATIENT
Start: 2020-09-30 | End: 2020-09-30

## 2020-09-30 RX ORDER — METHOCARBAMOL 750 MG/1
750 TABLET, FILM COATED ORAL 3 TIMES DAILY PRN
Qty: 21 TABLET | Refills: 0 | Status: SHIPPED | OUTPATIENT
Start: 2020-09-30 | End: 2020-10-07

## 2020-09-30 RX ADMIN — ACETAMINOPHEN 1000 MG: 500 TABLET, FILM COATED ORAL at 18:42

## 2020-09-30 RX ADMIN — METHOCARBAMOL 1000 MG: 500 TABLET ORAL at 18:42

## 2020-09-30 RX ADMIN — LIDOCAINE 2 PATCH: 560 PATCH PERCUTANEOUS; TOPICAL; TRANSDERMAL at 18:42

## 2020-09-30 ASSESSMENT — ENCOUNTER SYMPTOMS
WEAKNESS: 0
ABDOMINAL PAIN: 0
HEADACHES: 1
NECK STIFFNESS: 1
SHORTNESS OF BREATH: 0
MYALGIAS: 1
NUMBNESS: 0
WOUND: 0
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
## 2020-09-30 NOTE — ED AVS SNAPSHOT
[facility] Emergency Department  [address]  [facility] [address]  Phone:  [phone]  Fax:  [phone]                                    Lisa Johnston   MRN: [id number]    Department:  [facility] Emergency Department   Date of Visit:  9/30/2020           After Visit Summary Signature Page    I have received my discharge instructions, and my questions have been answered. I have discussed any challenges I see with this plan with the nurse or doctor.    ..........................................................................................................................................  Patient/Patient Representative Signature      ..........................................................................................................................................  Patient Representative Print Name and Relationship to Patient    ..................................................               ................................................  Date                                   Time    ..........................................................................................................................................  Reviewed by Signature/Title    ...................................................              ..............................................  Date                                               Time          22EPIC Rev 08/18

## 2020-10-01 NOTE — DISCHARGE INSTRUCTIONS
Tylenol scheduled for the next 3-5 days. 650-1000 mg of Tylenol.  Tylenol are every 6 hours.  Follow directions. Use OTC lidocaine patches as directed.   Ice or heat to area.  I recommend ice in the first 24-48 hours.

## 2021-10-07 ENCOUNTER — TRANSFERRED RECORDS (OUTPATIENT)
Dept: HEALTH INFORMATION MANAGEMENT | Facility: CLINIC | Age: 81
End: 2021-10-07

## 2021-10-11 ENCOUNTER — HOSPITAL ENCOUNTER (EMERGENCY)
Facility: CLINIC | Age: 81
Discharge: HOME | End: 2021-10-11
Attending: EMERGENCY MEDICINE | Admitting: EMERGENCY MEDICINE
Payer: MEDICARE

## 2021-10-11 ENCOUNTER — APPOINTMENT (OUTPATIENT)
Dept: CT IMAGING | Facility: CLINIC | Age: 81
End: 2021-10-11
Attending: EMERGENCY MEDICINE
Payer: MEDICARE

## 2021-10-11 VITALS
SYSTOLIC BLOOD PRESSURE: 146 MMHG | OXYGEN SATURATION: 96 % | RESPIRATION RATE: 18 BRPM | HEART RATE: 73 BPM | TEMPERATURE: 97.2 F | DIASTOLIC BLOOD PRESSURE: 81 MMHG

## 2021-10-11 DIAGNOSIS — N39.0 URINARY TRACT INFECTION WITHOUT HEMATURIA, SITE UNSPECIFIED: ICD-10-CM

## 2021-10-11 LAB
ALBUMIN UR-MCNC: NEGATIVE MG/DL
ANION GAP SERPL CALCULATED.3IONS-SCNC: 10 MMOL/L (ref 3–14)
APPEARANCE UR: ABNORMAL
BACTERIA #/AREA URNS HPF: ABNORMAL /HPF
BASOPHILS # BLD AUTO: 0.1 10E3/UL (ref 0–0.2)
BASOPHILS NFR BLD AUTO: 0 %
BILIRUB UR QL STRIP: NEGATIVE
BUN SERPL-MCNC: 18 MG/DL (ref 7–30)
CALCIUM SERPL-MCNC: 9.2 MG/DL (ref 8.5–10.1)
CHLORIDE BLD-SCNC: 104 MMOL/L (ref 94–109)
CO2 SERPL-SCNC: 24 MMOL/L (ref 20–32)
COLOR UR AUTO: YELLOW
CREAT BLD-MCNC: 1 MG/DL (ref 0.5–1)
CREAT SERPL-MCNC: 0.88 MG/DL (ref 0.52–1.04)
EOSINOPHIL # BLD AUTO: 0.1 10E3/UL (ref 0–0.7)
EOSINOPHIL NFR BLD AUTO: 1 %
ERYTHROCYTE [DISTWIDTH] IN BLOOD BY AUTOMATED COUNT: 13 % (ref 10–15)
GFR SERPL CREATININE-BSD FRML MDRD: 52 ML/MIN/1.73M2
GFR SERPL CREATININE-BSD FRML MDRD: 61 ML/MIN/1.73M2
GLUCOSE BLD-MCNC: 105 MG/DL (ref 70–99)
GLUCOSE UR STRIP-MCNC: NEGATIVE MG/DL
HCT VFR BLD AUTO: 38.5 % (ref 35–47)
HGB BLD-MCNC: 13.3 G/DL (ref 11.7–15.7)
HGB UR QL STRIP: NEGATIVE
IMM GRANULOCYTES # BLD: 0 10E3/UL
IMM GRANULOCYTES NFR BLD: 0 %
KETONES UR STRIP-MCNC: NEGATIVE MG/DL
LACTATE SERPL-SCNC: 1.5 MMOL/L (ref 0.7–2)
LEUKOCYTE ESTERASE UR QL STRIP: ABNORMAL
LYMPHOCYTES # BLD AUTO: 10.2 10E3/UL (ref 0.8–5.3)
LYMPHOCYTES NFR BLD AUTO: 68 %
MCH RBC QN AUTO: 33.4 PG (ref 26.5–33)
MCHC RBC AUTO-ENTMCNC: 34.5 G/DL (ref 31.5–36.5)
MCV RBC AUTO: 97 FL (ref 78–100)
MONOCYTES # BLD AUTO: 0.7 10E3/UL (ref 0–1.3)
MONOCYTES NFR BLD AUTO: 5 %
NEUTROPHILS # BLD AUTO: 3.8 10E3/UL (ref 1.6–8.3)
NEUTROPHILS NFR BLD AUTO: 26 %
NITRATE UR QL: POSITIVE
NRBC # BLD AUTO: 0 10E3/UL
NRBC BLD AUTO-RTO: 0 /100
PH UR STRIP: 5.5 [PH] (ref 5–7)
PLAT MORPH BLD: NORMAL
PLATELET # BLD AUTO: 221 10E3/UL (ref 150–450)
POTASSIUM BLD-SCNC: 4 MMOL/L (ref 3.4–5.3)
RBC # BLD AUTO: 3.98 10E6/UL (ref 3.8–5.2)
RBC MORPH BLD: NORMAL
RBC URINE: 10 /HPF
SODIUM SERPL-SCNC: 138 MMOL/L (ref 133–144)
SP GR UR STRIP: 1.01 (ref 1–1.03)
SQUAMOUS EPITHELIAL: 1 /HPF
UROBILINOGEN UR STRIP-MCNC: NORMAL MG/DL
WBC # BLD AUTO: 14.8 10E3/UL (ref 4–11)
WBC CLUMPS #/AREA URNS HPF: PRESENT /HPF
WBC URINE: >182 /HPF

## 2021-10-11 PROCEDURE — 96365 THER/PROPH/DIAG IV INF INIT: CPT | Mod: 59

## 2021-10-11 PROCEDURE — 87086 URINE CULTURE/COLONY COUNT: CPT | Performed by: EMERGENCY MEDICINE

## 2021-10-11 PROCEDURE — 80048 BASIC METABOLIC PNL TOTAL CA: CPT | Performed by: EMERGENCY MEDICINE

## 2021-10-11 PROCEDURE — G1004 CDSM NDSC: HCPCS

## 2021-10-11 PROCEDURE — 250N000011 HC RX IP 250 OP 636: Performed by: EMERGENCY MEDICINE

## 2021-10-11 PROCEDURE — 81001 URINALYSIS AUTO W/SCOPE: CPT | Performed by: EMERGENCY MEDICINE

## 2021-10-11 PROCEDURE — 250N000009 HC RX 250: Performed by: EMERGENCY MEDICINE

## 2021-10-11 PROCEDURE — 83605 ASSAY OF LACTIC ACID: CPT | Performed by: EMERGENCY MEDICINE

## 2021-10-11 PROCEDURE — 82565 ASSAY OF CREATININE: CPT

## 2021-10-11 PROCEDURE — 99285 EMERGENCY DEPT VISIT HI MDM: CPT | Mod: 25

## 2021-10-11 PROCEDURE — 36415 COLL VENOUS BLD VENIPUNCTURE: CPT | Performed by: EMERGENCY MEDICINE

## 2021-10-11 PROCEDURE — 85004 AUTOMATED DIFF WBC COUNT: CPT | Performed by: EMERGENCY MEDICINE

## 2021-10-11 RX ORDER — CEFTRIAXONE 2 G/1
2 INJECTION, POWDER, FOR SOLUTION INTRAMUSCULAR; INTRAVENOUS ONCE
Status: COMPLETED | OUTPATIENT
Start: 2021-10-11 | End: 2021-10-11

## 2021-10-11 RX ORDER — IOPAMIDOL 755 MG/ML
500 INJECTION, SOLUTION INTRAVASCULAR ONCE
Status: COMPLETED | OUTPATIENT
Start: 2021-10-11 | End: 2021-10-11

## 2021-10-11 RX ORDER — CEFDINIR 300 MG/1
300 CAPSULE ORAL 2 TIMES DAILY
Qty: 20 CAPSULE | Refills: 0 | Status: SHIPPED | OUTPATIENT
Start: 2021-10-11 | End: 2021-10-21

## 2021-10-11 RX ADMIN — IOPAMIDOL 99 ML: 755 INJECTION, SOLUTION INTRAVENOUS at 18:41

## 2021-10-11 RX ADMIN — SODIUM CHLORIDE 65 ML: 9 INJECTION, SOLUTION INTRAVENOUS at 18:41

## 2021-10-11 RX ADMIN — CEFTRIAXONE 2 G: 2 INJECTION, POWDER, FOR SOLUTION INTRAMUSCULAR; INTRAVENOUS at 19:00

## 2021-10-11 ASSESSMENT — ENCOUNTER SYMPTOMS
VOMITING: 0
ABDOMINAL PAIN: 1
BACK PAIN: 0
NAUSEA: 0
FEVER: 0
HEMATURIA: 0
DYSURIA: 1
CHILLS: 0

## 2021-10-11 NOTE — ED TRIAGE NOTES
Pt presents to ED for IV antibiotics for UTI. Pt was diagnosed with klebsiella UTI and has not been able to get her antibiotics filled. See paperwork from urgent care. ABC intact. A/O x4.

## 2021-10-11 NOTE — ED PROVIDER NOTES
History   Chief Complaint:  UTI       HPI   Lias Johnston is a 81 year old female who presents with worsening and recurrent UTI that has been occurring every 2-3 months for the past year. She has been having UTI more often the last 3 months. She took antibiotics and got better but then 3 days later the pain came back and the antibiotics does not seem to be helping. She presents at the ED because her next appointment with her provider is 11/28 and she needed to see someone soon. Before the ED she was seen at urgent care 6 days ago. She was given a prescription and had some problems getting that prescription. She has been having intermittent abdominal pain but not new. The last time she took antibiotics was 3 weeks ago. She denies new back pain, fever, chills, nausea, or vomiting. She denies bleeding or vaginal discharge. Of note, her grandson had passed away recently and has been under lots of stress and has not been sleeping well.           Review of Systems   Constitutional: Negative for chills and fever.   Gastrointestinal: Positive for abdominal pain. Negative for nausea and vomiting.   Genitourinary: Positive for dysuria. Negative for hematuria and vaginal discharge.   Musculoskeletal: Negative for back pain.   All other systems reviewed and are negative.    Allergies:  Penicillins  Adhesive Tape  Gluten Meal  Hmg-Coa-R Inhibitors  Levaquin [Levofloxacin]  Sulfa Drugs  Latex    Medications:  ASA  Cozaar  Metroprolol  Oxycodone   Prednisone  Senna-docusate  Aldactone  Ultram    Past Medical History:     Arthritis  GERD  Hiatal hernia  Hypertension  Renal disease  Asthma  Spinal stenosis    Past Surgical History:    Abdomen surgery  Appendectomy  Arthroscopy  Colonoscopy  Cryotherapy  Fusion lumbar decompression  Genitourinary surgery  Graft bone  Orthopedic  Bladder repair  Vitrectomy    Family History:    CAD  Cancer    Social History:  The patient presents alone.    Physical Exam     Patient Vitals for the  past 24 hrs:   BP Temp Temp src Pulse Resp SpO2   10/11/21 1905 -- -- -- -- -- 96 %   10/11/21 1709 -- -- -- -- -- 97 %   10/11/21 1708 -- -- -- -- -- 95 %   10/11/21 1707 -- -- -- -- -- 97 %   10/11/21 1700 (!) 146/81 -- -- -- -- --   10/11/21 1157 (!) 166/84 97.2  F (36.2  C) Temporal 73 18 94 %       Physical Exam  General: Patient is awake, alert and interactive when I enter the room  Head: The scalp, face, and head appear normal  Eyes: The pupils are equal, round, and reactive to light. Conjunctivae and sclerae are normal  ENT: External acoustic canals are normal. The oropharynx is normal without erythema. Uvula is in the midline  Neck: Normal range of motion.   CV: Regular rate and rhythm.   Resp: Lungs are clear without wheezes or rales. No respiratory distress.   GI: Abdomen is soft, no rigidity, guarding, or rebound. No distension. No tenderness to palpation in any quadrant.     MS: Normal tone. Joints grossly normal without effusions. No asymmetric leg swelling, calf or thigh tenderness.    Skin: No rash or lesions noted. Normal capillary refill noted  Neuro: Speech is normal and fluent. Face is symmetric. Moving all extremities.   Psych:  Normal affect.  Appropriate interactions.    Emergency Department Course     Imaging:  CT Abdomen Pelvis w Contrast   Final Result   IMPRESSION:    1.  Diverticulosis left colon, without evidence for diverticulitis.   2.  Bilobed 3.9 x 2.5 cm cystic lesion left adnexa, indeterminate. Further evaluation with pelvic ultrasound recommended.   3.  Small bilateral renal cysts and probable tiny cysts in the liver.   4.  Fatty infiltration of the liver.   5.  Gallstone.        Report per radiology    Laboratory:  Labs Ordered and Resulted from Time of ED Arrival Up to the Time of Departure from the ED   BASIC METABOLIC PANEL - Abnormal; Notable for the following components:       Result Value    Glucose 105 (*)     All other components within normal limits   ROUTINE UA WITH  MICROSCOPIC REFLEX TO CULTURE - Abnormal; Notable for the following components:    Appearance Urine Slightly Cloudy (*)     Nitrite Urine Positive (*)     Leukocyte Esterase Urine Large (*)     Bacteria Urine Few (*)     WBC Clumps Urine Present (*)     RBC Urine 10 (*)     WBC Urine >182 (*)     All other components within normal limits    Narrative:     Urine Culture ordered based on laboratory criteria   CBC WITH PLATELETS AND DIFFERENTIAL - Abnormal; Notable for the following components:    WBC Count 14.8 (*)     MCH 33.4 (*)     Absolute Lymphocytes 10.2 (*)     All other components within normal limits   ISTAT CREATININE POCT - Abnormal; Notable for the following components:    GFR, ESTIMATED POCT 52 (*)     All other components within normal limits   LACTIC ACID WHOLE BLOOD - Normal   RBC AND PLATELET MORPHOLOGY   URINE CULTURE   URINE CULTURE   CBC WITH PLATELETS & DIFFERENTIAL    Narrative:     The following orders were created for panel order CBC with platelets differential.  Procedure                               Abnormality         Status                     ---------                               -----------         ------                     CBC with platelets and d...[[id number]]  Abnormal            Final result               RBC and Platelet Morphology[[id number]]                      Final result                 Please view results for these tests on the individual orders.          Emergency Department Course:  Reviewed:  I reviewed nursing notes, vitals, past medical history and Care Everywhere    Assessments:  1710 I obtained history and examined the patient as noted above.     1932 I rechecked the patient and explained findings.    2002 I rechecked the patient and explained findings.    Consults:  1913 I spoke with the pharmacist regarding the patient's situation    Interventions:  1900 Rocephin 2 g IV    Disposition:  The patient was discharged to home.     Impression & Plan     Medical Decision  Making:  Lisa Johnston is a 81-year-old woman with past medical history of GERD, hypertension, recurrent UTIs, asthma, ankylosing spondylolysis status post spinal stimulator and chronic kidney disease who presents emergency department today with symptoms consistent with previous UTIs.  Patient was recently seen and evaluated at outside clinic urgent care where she was diagnosed with a urinary tract infection.  However she never picked up her antibiotics.  Cultures grew out Klebsiella pneumonia resistant to multiple antibiotics. She received a call from urgent care provider today after she reviewed patient's cultures and referred her to the emergency department for further evaluation and treatment.  Upon evaluation here she is hemodynamically stable with normal vital signs.  She is afebrile.  Patient has minimal abdominal tenderness and no significant CVA tenderness.  However given her history of recurrent UTIs I obtain a CT scan to rule out complicating factors including fistula, kidney stone or bladder outlet obstruction.  This was negative for any complicating factors that would increase her risk of UTI.  However there was evidence of a left adnexal cystic mass.  The results of the CT were discussed with the patient.  I highly recommended that she follow-up with outpatient provider for pelvic ultrasound for further evaluation.  I discussed plan of care with the ED pharmacist after reviewing urine culture.  At this point we feel that a third-generation cephalosporin should be sufficient to treat.  She was given ceftriaxone in the emergency department given her history of penicillin anaphylaxis.  She tolerated this well.  She will be sent home with course of cefdinir with close follow-up with her primary care physician.  I also strongly encouraged her to follow-up with urology.  If we are unsuccessful at treating with third-generation cephalosporin fosfomycin could be considered as well.    Diagnosis:    ICD-10-CM     1. Urinary tract infection without hematuria, site unspecified  N39.0        Discharge Medications:  New Prescriptions    CEFDINIR (OMNICEF) 300 MG CAPSULE    Take 1 capsule (300 mg) by mouth 2 times daily for 10 days       Scribe Disclosure:  I, Brian Marroquin, am serving as a scribe at 5:05 PM on 10/11/2021 to document services personally performed by David Kelly MD based on my observations and the provider's statements to me.              David Kelly MD  10/11/21 2125

## 2021-10-13 LAB — BACTERIA UR CULT: ABNORMAL

## 2021-11-05 ENCOUNTER — OFFICE VISIT (OUTPATIENT)
Dept: OBGYN | Facility: CLINIC | Age: 81
End: 2021-11-05
Payer: MEDICARE

## 2021-11-05 VITALS — WEIGHT: 202 LBS | SYSTOLIC BLOOD PRESSURE: 134 MMHG | BODY MASS INDEX: 35.78 KG/M2 | DIASTOLIC BLOOD PRESSURE: 76 MMHG

## 2021-11-05 DIAGNOSIS — N94.89 ADNEXAL MASS: Primary | ICD-10-CM

## 2021-11-05 PROCEDURE — 99203 OFFICE O/P NEW LOW 30 MIN: CPT | Performed by: OBSTETRICS & GYNECOLOGY

## 2021-11-05 RX ORDER — PREDNISOLONE ACETATE 10 MG/ML
SUSPENSION/ DROPS OPHTHALMIC
Status: ON HOLD | COMMUNITY
Start: 2021-09-12 | End: 2021-12-01

## 2021-11-05 RX ORDER — TOPIRAMATE 25 MG/1
TABLET, FILM COATED ORAL
COMMUNITY
Start: 2021-11-01

## 2021-11-05 NOTE — PROGRESS NOTES
Assessment & Plan     Adnexal mass  - Will assess by U/S per radiology recommendation, but also will get Gyn Onc consult due to appearance on CT and Pt's age, and if surgery is recommended the Gyn Onc would be the best to do it so staging can be done if needed.  - Oncology/Hematology Adult Referral; Future  - US Pelvic Complete with Transvaginal; Future    Review of the result(s) of each unique test - CT Abd/Pelvis 10/11/2021 showing bilobed 3.9 cm left adnexal mass, Ur C&S confirmed UTI (already Rx'd).             No follow-ups on file.    Tanner Sun MD  [facility] [place]olejo is a 81 year old who presents for the following health issues     Pt here for left adnexal mass.  Pt was in [facility] ER 10/11/2021 w/ abd pain.  UA and C&S showed UTI and she was Rx'd.  CT Abd/Pelvis was also done which showed a 3.8 cm bilobed left adnexal mass, which is the reason she was advised to see Gyn. She is s/p TATIANNA in her 30s for benign indications.  She had undergone spontaneous hormonal menopause in her late 40s and had been on ERT for some years until stopping them about 30 years ago.  Her abd pain is better now.             Review of Systems         Objective    There were no vitals taken for this visit.  There is no height or weight on file to calculate BMI.  Physical Exam  Constitutional:       General: She is not in acute distress.     Appearance: Normal appearance. She is obese. She is not ill-appearing.   Neurological:      Mental Status: She is alert.      Abdomen- Abdomen soft, non-tender. BS normal. No masses, organomegaly, positive findings: obese, midline vertical scar and Pfannenstiel scar  Pelvic- Exam chaperoned by nurse, External genitalia normal but atrophic, Bartholin's glands normal, [place]'s glands normal, Urethral meatus normal, Urethra normal, Bladder normal, Vagina with moderately severe atrophy and relaxation, no abnormal lesions, no abnormal discharge, Grade 3  rectocele, Post-hysterectomy status, vaginal cuff well healed, normal rugation, no lesions or abnormal discharge, Uterus surgically absent, Adnexa without obvious masses or tenderness bilaterally, Anus normal, Pelvic exam limited by obesity

## 2021-11-08 DIAGNOSIS — N94.89 ADNEXAL MASS: Primary | ICD-10-CM

## 2021-11-08 NOTE — PROGRESS NOTES
Consult Notes on Referred Patient        Dr. Tanner Sun MD  [address] [place] [address]       RE: Lisa Johnston  : 1940  TORREY: 2021    Dear Dr. Tanner Sun:    I had the pleasure of seeing your patient Lisa Johnston here at the Gynecologic Cancer Clinic at the [facility] on 2021.  As you know she is a very pleasant 81 year old woman with a recent diagnosis of pelvic mass.  Given these findings she was subsequently sent to the Gynecologic Cancer Clinic for new patient consultation.  She is unaccompanied today.    Patient initially presented to urgent care with UTI symptoms.  She reports she has had recurrent UTI and her PCP thus sent her to the ED for evaluation.  She was noted to have an incidental pelvic mass.  She reports she is now on suppression for UTI with nitrofurantoin.      10/11/21:  CT A/P (Personally reviewed):  1.  Diverticulosis left colon, without evidence for diverticulitis. 2.  Bilobed 3.9 x 2.5 cm cystic lesion left adnexa, indeterminate. 3.  Small bilateral renal cysts and probable tiny cysts in the liver. 4.  Fatty infiltration of the liver. 5.  Gallstone.      Obstetrics and Gynecology History:  ,  x 5, 3 living children-one SIDS death, one infant death  ERT use x 10 year      Past Medical History:  Past Medical History:   Diagnosis Date     Arthritis     psoriatic arthritis     Gastro-oesophageal reflux disease      Hypertension      Noninfectious ileitis     IBS     Uncomplicated asthma      Unspecified cerebral artery occlusion with cerebral infarction            Past Surgical History:  Past Surgical History:   Procedure Laterality Date     APPENDECTOMY       ARTHROSCOPY SHOULDER RT/LT      right x2, left x1     COLONOSCOPY       CRYOTHERAPY  10/30/2013    Procedure: CRYOTHERAPY;;  Surgeon: Ramírez Warner MD;  Location:  [facility]     DECOMPRESSION, FUSION LUMBAR POSTERIOR ONE LEVEL, COMBINED Bilateral 2018     Procedure: COMBINED DECOMPRESSION, FUSION LUMBAR POSTERIOR ONE LEVEL;  BILATERAL L4-5 DECOMPRESSION, L4-5 POSTERIOR SPINAL FUSION WITH MEDTRONIC INSTRUMENTATION, RIGHT ILIAC CREST BONE GRAFT ;  Surgeon: Price Prajapati MD;  Location:  OR     GENITOURINARY SURGERY      vaginal repair     GRAFT BONE FROM ILIAC CREST Right 2018    Procedure: GRAFT BONE FROM ILIAC CREST;;  Surgeon: Price Prajapati MD;  Location:  OR     ORTHOPEDIC SURGERY Left     bunionectomy x1     ORTHOPEDIC SURGERY      left foot pinning, right foot pinning     CA TOTAL ABDOM HYSTERECTOMY       REPAIR BLADDER       REVERSE ARTHROPLASTY SHOULDER Right 2018    Procedure: 1.  Right reverse shoulder arthroplasty.  2.  Biceps tenodesis. ;  Surgeon: Mamadou Chen MD;  Location:  OR     VITRECTOMY PARSPLANA WITH 23 GAUGE SYSTEM  10/30/2013    Procedure: VITRECTOMY PARSPLANA WITH 23 GAUGE SYSTEM;  RIGHT 23 GAUGE PARSPLANA VITRECTOMY, ENDOLASER, AIR FLUID EXCHANGE, INFUSION 30% SF6 GAS, CRYOTHERAPY;  Surgeon: Ramírez Warner MD;  Location: [facility]       Health Maintenance:  Last Pap Smear: Prior to hysterectomy  She has not had a history of abnormal Pap smears.    Last Mammogram: 19              Result: normal      She has not had a history of abnormal mammograms.    Last Colonoscopy: 4/10/18              Result: Polyps-repeat 5 years       Social History:  Lives with her , feels safe at home.  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  Does not have an advanced directive on file and would like her  to be her POA.  Would like full resuscitation if reversible cause is identified, however would not like to be kept on life sustaining measures long-term.     Family History:   The patient's family history is significant for.  Family History   Problem Relation Age of Onset     Cerebrovascular Disease Mother          age 77     Bladder Cancer Father          age 42     Coronary  "Artery Disease Sister      Colon Cancer Brother          age 61     Breast Cancer Daughter      Uterine Cancer Maternal Aunt      Breast Cancer Maternal Aunt          Physical Exam:   BP (!) 153/81   Pulse 92   Temp 98.4  F (36.9  C) (Tympanic)   Resp 16   Ht 1.581 m (5' 2.25\")   Wt 90.7 kg (199 lb 14.4 oz)   SpO2 93%   BMI 36.27 kg/m    Body mass index is 36.27 kg/m .    General Appearance: healthy and alert, no distress        Cardiovascular: regular rate and rhythm    Respiratory: lungs clear     Gastrointestinal:       abdomen soft, non-tender, non-distended, no organomegaly or masses    Genitourinary: External genitalia and urethral meatus appears normal.  Vagina is smooth without nodularity or masses.  Cervix appears normal and without lesions.  Bimanual exam reveal no masses, nodularity or fullness.  Recto-vaginal exam confirms these findings.    Labs:  WBC 16.9 with ANC -.  Hemoglobin 13.9.  Platelets 266.  Creatinine 1.33.  Potassium 4.1.  Magnesium -.  Remainder of electrolytes within normal limits.  AST 24, ALT 18, alkaline phosphatase 87, total bilirubin 0.4.  Albumin 4.2.       Assessment:    Lisa Johnston is a 81 year old woman with a new diagnosis of pelvic mass.     A total of 60 minutes was spent on patient care today.      Plan:     1.)    Pelvic mass-We reviewed possible etiologies including benign, malignant and borderline.  We reviewed role of  and this will be obtained today.  We reviewed her CT showing a low risk cyst of the left ovary without evidence of metastatic disease.  We reviewed the rationale for not performing a biopsy of the ovary.  We discussed options for repeat imaging vs immediate surgical resection. She has had several members of her family with cancer and also two good friends who have  of ovarian cancer and thus prefers to proceed with immediate surgery.  We reviewed the role of frozen section analysis and that further surgical decisions would be based " on these findings.  We will also plan cystoscopy regardless of pathology given her recurrent UTI.  Risks, benefits, indications and alternatives were discussed with the patient.  Her questions were answered and she will undergo laparoscopic removal of both ovaries and fallopian tubes, possible cancer surgery, cystoscopy on 12/1.  She is on chronic steroids with 10mg of prednisone daily and will discuss with her PCP if she needs stress dose steroids.    2.) Genetic risk factors were assessed and the patient does not meet the qualifications for a referral.      3.) Labs and/or tests ordered include:  , CBC, CMP, T/S, COVID, EKG     4.) Health maintenance issues addressed today include pt is due for a mammogram which will be deferred at this time.    5.) Pre-operative teaching was completed today        Thank you for allowing us to participate in the care of your patient.         Sincerely,    Radha Brizuela MD  Gynecologic Oncology  [facility]       [place]

## 2021-11-08 NOTE — PATIENT INSTRUCTIONS
You have been scheduled for surgery on: 12/1    Diagnosis:  Pelvic mass    The surgical procedure is: Laparoscopic removal of both ovaries and fallopian tubes, possible cancer surgery, cystoscopy    Anticipated length of surgery: 1-2 hrs.  You will be in the recovery room for approximately 2-3 hrs after surgery.  Your family will not be able to see you until after you leave the recovery room.    Length of hospital stay:  This is an outpatient, extended stay surgery.  You will be discharged same day if you meet criteria for discharge.  If you have a larger surgical incision, you will need to be in the hospital 2-3 days.  ______________________________________________________________________    Preparation for Surgery:    To Schedule   1.  Labs:  CBC, CMP, T/S, , orders placed, please perform today   2.  EKG:  Order placed, please perform today   3.  Post-operative exam with me 1-2 weeks following surgery      Postoperative Restrictions:  No heavy lifting >20lbs for six weeks, nothing in the vagina (no tampons, no intercourse, no douching) for eight weeks.     Postoperative visit:  Return to clinic 1-2 weeks after surgery for post operative visit.  Visit with Dr Brizuela on 12/1/2021 at 2 pm in person.  Mitzi Deluna, RN, BSN, OCN        Please contact the clinic with any questions or concerns.    Radha Brizuela MD  Gynecologic Oncology  [facility]

## 2021-11-08 NOTE — PROGRESS NOTES
RECORDS STATUS - ALL OTHER DIAGNOSIS      RECORDS RECEIVED FROM: [facility]   DATE RECEIVED: 11/9/2021   NOTES STATUS DETAILS   OFFICE NOTE from referring provider Complete  Epic - Tanner Sun MD   OFFICE NOTE from medical oncologist N/A    DISCHARGE SUMMARY from hospital     DISCHARGE REPORT from the ER     OPERATIVE REPORT N/A    MEDICATION LIST Complete [facility]   CLINICAL TRIAL TREATMENTS TO DATE     LABS     PATHOLOGY REPORTS     ANYTHING RELATED TO DIAGNOSIS Complete Labs last updated on 11/8/2021   GENONOMIC TESTING     TYPE:     IMAGING (NEED IMAGES & REPORT)     CT SCANS Complete CT Abdomen Pelvis 10/11/2021   MRI     MAMMO     ULTRASOUND Complete US Pelvic Complete (scheduled)    PET

## 2021-11-09 ENCOUNTER — ONCOLOGY VISIT (OUTPATIENT)
Dept: ONCOLOGY | Facility: CLINIC | Age: 81
End: 2021-11-09
Attending: OBSTETRICS & GYNECOLOGY
Payer: MEDICARE

## 2021-11-09 ENCOUNTER — PRE VISIT (OUTPATIENT)
Dept: ONCOLOGY | Facility: CLINIC | Age: 81
End: 2021-11-09

## 2021-11-09 VITALS
HEIGHT: 62 IN | HEART RATE: 92 BPM | WEIGHT: 199.9 LBS | SYSTOLIC BLOOD PRESSURE: 153 MMHG | RESPIRATION RATE: 16 BRPM | DIASTOLIC BLOOD PRESSURE: 81 MMHG | TEMPERATURE: 98.4 F | OXYGEN SATURATION: 93 % | BODY MASS INDEX: 36.78 KG/M2

## 2021-11-09 DIAGNOSIS — R19.09 OTHER INTRA-ABDOMINAL AND PELVIC SWELLING, MASS AND LUMP: ICD-10-CM

## 2021-11-09 DIAGNOSIS — N94.89 ADNEXAL MASS: ICD-10-CM

## 2021-11-09 DIAGNOSIS — N94.89 ADNEXAL MASS: Primary | ICD-10-CM

## 2021-11-09 LAB
ABO/RH(D): NORMAL
ALBUMIN SERPL-MCNC: 4.2 G/DL (ref 3.4–5)
ALP SERPL-CCNC: 87 U/L (ref 40–150)
ALT SERPL W P-5'-P-CCNC: 24 U/L (ref 0–50)
ANION GAP SERPL CALCULATED.3IONS-SCNC: 7 MMOL/L (ref 3–14)
ANTIBODY SCREEN: NEGATIVE
AST SERPL W P-5'-P-CCNC: 18 U/L (ref 0–45)
BASOPHILS # BLD AUTO: 0 10E3/UL (ref 0–0.2)
BASOPHILS NFR BLD AUTO: 0 %
BILIRUB SERPL-MCNC: 0.4 MG/DL (ref 0.2–1.3)
BUN SERPL-MCNC: 30 MG/DL (ref 7–30)
CALCIUM SERPL-MCNC: 9.6 MG/DL (ref 8.5–10.1)
CANCER AG125 SERPL-ACNC: 21 U/ML (ref 0–30)
CHLORIDE BLD-SCNC: 102 MMOL/L (ref 94–109)
CO2 SERPL-SCNC: 25 MMOL/L (ref 20–32)
CREAT SERPL-MCNC: 1.33 MG/DL (ref 0.52–1.04)
EOSINOPHIL # BLD AUTO: 0.1 10E3/UL (ref 0–0.7)
EOSINOPHIL NFR BLD AUTO: 1 %
ERYTHROCYTE [DISTWIDTH] IN BLOOD BY AUTOMATED COUNT: 12.8 % (ref 10–15)
GFR SERPL CREATININE-BSD FRML MDRD: 37 ML/MIN/1.73M2
GLUCOSE BLD-MCNC: 104 MG/DL (ref 70–99)
HCT VFR BLD AUTO: 41.7 % (ref 35–47)
HGB BLD-MCNC: 13.9 G/DL (ref 11.7–15.7)
IMM GRANULOCYTES # BLD: 0.1 10E3/UL
IMM GRANULOCYTES NFR BLD: 0 %
LYMPHOCYTES # BLD AUTO: 9.8 10E3/UL (ref 0.8–5.3)
LYMPHOCYTES NFR BLD AUTO: 59 %
MCH RBC QN AUTO: 32.7 PG (ref 26.5–33)
MCHC RBC AUTO-ENTMCNC: 33.3 G/DL (ref 31.5–36.5)
MCV RBC AUTO: 98 FL (ref 78–100)
MONOCYTES # BLD AUTO: 0.9 10E3/UL (ref 0–1.3)
MONOCYTES NFR BLD AUTO: 5 %
NEUTROPHILS # BLD AUTO: 6 10E3/UL (ref 1.6–8.3)
NEUTROPHILS NFR BLD AUTO: 35 %
NRBC # BLD AUTO: 0 10E3/UL
NRBC BLD AUTO-RTO: 0 /100
PLAT MORPH BLD: NORMAL
PLATELET # BLD AUTO: 266 10E3/UL (ref 150–450)
POTASSIUM BLD-SCNC: 4.1 MMOL/L (ref 3.4–5.3)
PROT SERPL-MCNC: 8.3 G/DL (ref 6.8–8.8)
RBC # BLD AUTO: 4.25 10E6/UL (ref 3.8–5.2)
RBC MORPH BLD: NORMAL
SODIUM SERPL-SCNC: 134 MMOL/L (ref 133–144)
SPECIMEN EXPIRATION DATE: NORMAL
WBC # BLD AUTO: 16.9 10E3/UL (ref 4–11)

## 2021-11-09 PROCEDURE — 99205 OFFICE O/P NEW HI 60 MIN: CPT | Performed by: OBSTETRICS & GYNECOLOGY

## 2021-11-09 PROCEDURE — 36415 COLL VENOUS BLD VENIPUNCTURE: CPT

## 2021-11-09 PROCEDURE — 93000 ELECTROCARDIOGRAM COMPLETE: CPT | Performed by: OBSTETRICS & GYNECOLOGY

## 2021-11-09 PROCEDURE — 86304 IMMUNOASSAY TUMOR CA 125: CPT | Performed by: OBSTETRICS & GYNECOLOGY

## 2021-11-09 PROCEDURE — 85004 AUTOMATED DIFF WBC COUNT: CPT | Performed by: OBSTETRICS & GYNECOLOGY

## 2021-11-09 PROCEDURE — 86900 BLOOD TYPING SEROLOGIC ABO: CPT | Performed by: OBSTETRICS & GYNECOLOGY

## 2021-11-09 PROCEDURE — 80053 COMPREHEN METABOLIC PANEL: CPT | Performed by: OBSTETRICS & GYNECOLOGY

## 2021-11-09 PROCEDURE — G0463 HOSPITAL OUTPT CLINIC VISIT: HCPCS

## 2021-11-09 PROCEDURE — 93005 ELECTROCARDIOGRAM TRACING: CPT

## 2021-11-09 RX ORDER — HEPARIN SODIUM 10000 [USP'U]/ML
5000 INJECTION, SOLUTION INTRAVENOUS; SUBCUTANEOUS
Status: CANCELLED | OUTPATIENT
Start: 2021-11-09

## 2021-11-09 RX ORDER — PHENAZOPYRIDINE HYDROCHLORIDE 100 MG/1
200 TABLET, FILM COATED ORAL ONCE
Status: CANCELLED | OUTPATIENT
Start: 2021-11-09 | End: 2021-11-09

## 2021-11-09 ASSESSMENT — PAIN SCALES - GENERAL: PAINLEVEL: MODERATE PAIN (4)

## 2021-11-09 ASSESSMENT — MIFFLIN-ST. JEOR: SCORE: 1318.96

## 2021-11-09 NOTE — LETTER
2021         RE: Lisa Johnston  [id number]  [facility] [address]        Dear Colleague,    Thank you for referring your patient, Lisa Johnston, to the [facility]. Please see a copy of my visit note below.    Consult Notes on Referred Patient        Dr. Tanner Sun MD  [address] [place] [address]       RE: Lisa Johnston  : 1940  TORREY: 2021    Dear Dr. Tanner Sun:    I had the pleasure of seeing your patient Lisa Johnston here at the Gynecologic Cancer Clinic at the [facility] on 2021.  As you know she is a very pleasant 81 year old woman with a recent diagnosis of pelvic mass.  Given these findings she was subsequently sent to the Gynecologic Cancer Clinic for new patient consultation.  She is unaccompanied today.    Patient initially presented to urgent care with UTI symptoms.  She reports she has had recurrent UTI and her PCP thus sent her to the ED for evaluation.  She was noted to have an incidental pelvic mass.  She reports she is now on suppression for UTI with nitrofurantoin.      10/11/21:  CT A/P (Personally reviewed):  1.  Diverticulosis left colon, without evidence for diverticulitis. 2.  Bilobed 3.9 x 2.5 cm cystic lesion left adnexa, indeterminate. 3.  Small bilateral renal cysts and probable tiny cysts in the liver. 4.  Fatty infiltration of the liver. 5.  Gallstone.      Obstetrics and Gynecology History:  ,  x 5, 3 living children-one SIDS death, one infant death  ERT use x 10 year      Past Medical History:  Past Medical History:   Diagnosis Date     Arthritis     psoriatic arthritis     Gastro-oesophageal reflux disease      Hypertension      Noninfectious ileitis     IBS     Uncomplicated asthma      Unspecified cerebral artery occlusion with cerebral infarction            Past Surgical History:  Past Surgical History:   Procedure Laterality Date     APPENDECTOMY        ARTHROSCOPY SHOULDER RT/LT      right x2, left x1     COLONOSCOPY       CRYOTHERAPY  10/30/2013    Procedure: CRYOTHERAPY;;  Surgeon: Ramírez Warner MD;  Location:  [facility]     DECOMPRESSION, FUSION LUMBAR POSTERIOR ONE LEVEL, COMBINED Bilateral 05/30/2018    Procedure: COMBINED DECOMPRESSION, FUSION LUMBAR POSTERIOR ONE LEVEL;  BILATERAL L4-5 DECOMPRESSION, L4-5 POSTERIOR SPINAL FUSION WITH MEDTRONIC INSTRUMENTATION, RIGHT ILIAC CREST BONE GRAFT ;  Surgeon: Price Prajapati MD;  Location:  OR     GENITOURINARY SURGERY      vaginal repair     GRAFT BONE FROM ILIAC CREST Right 05/30/2018    Procedure: GRAFT BONE FROM ILIAC CREST;;  Surgeon: Price Prajapati MD;  Location:  OR     ORTHOPEDIC SURGERY Left     bunionectomy x1     ORTHOPEDIC SURGERY      left foot pinning, right foot pinning     NC TOTAL ABDOM HYSTERECTOMY       REPAIR BLADDER       REVERSE ARTHROPLASTY SHOULDER Right 12/05/2018    Procedure: 1.  Right reverse shoulder arthroplasty.  2.  Biceps tenodesis. ;  Surgeon: Mamadou Chen MD;  Location: [facility] OR     VITRECTOMY PARSPLANA WITH 23 GAUGE SYSTEM  10/30/2013    Procedure: VITRECTOMY PARSPLANA WITH 23 GAUGE SYSTEM;  RIGHT 23 GAUGE PARSPLANA VITRECTOMY, ENDOLASER, AIR FLUID EXCHANGE, INFUSION 30% SF6 GAS, CRYOTHERAPY;  Surgeon: Ramírez Warner MD;  Location: [facility]       Health Maintenance:  Last Pap Smear: Prior to hysterectomy  She has not had a history of abnormal Pap smears.    Last Mammogram: 12/19/19              Result: normal      She has not had a history of abnormal mammograms.    Last Colonoscopy: 4/10/18              Result: Polyps-repeat 5 years       Social History:  Lives with her , feels safe at home.  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  Does not have an advanced directive on file and would like her  to be her POA.  Would like full resuscitation if reversible cause is identified, however would not like to be kept on life  "sustaining measures long-term.     Family History:   The patient's family history is significant for.  Family History   Problem Relation Age of Onset     Cerebrovascular Disease Mother          age 77     Bladder Cancer Father          age 42     Coronary Artery Disease Sister      Colon Cancer Brother          age 61     Breast Cancer Daughter      Uterine Cancer Maternal Aunt      Breast Cancer Maternal Aunt          Physical Exam:   BP (!) 153/81   Pulse 92   Temp 98.4  F (36.9  C) (Tympanic)   Resp 16   Ht 1.581 m (5' 2.25\")   Wt 90.7 kg (199 lb 14.4 oz)   SpO2 93%   BMI 36.27 kg/m    Body mass index is 36.27 kg/m .    General Appearance: healthy and alert, no distress        Cardiovascular: regular rate and rhythm    Respiratory: lungs clear     Gastrointestinal:       abdomen soft, non-tender, non-distended, no organomegaly or masses    Genitourinary: External genitalia and urethral meatus appears normal.  Vagina is smooth without nodularity or masses.  Cervix appears normal and without lesions.  Bimanual exam reveal no masses, nodularity or fullness.  Recto-vaginal exam confirms these findings.    Labs:  WBC 16.9 with ANC -.  Hemoglobin 13.9.  Platelets 266.  Creatinine 1.33.  Potassium 4.1.  Magnesium -.  Remainder of electrolytes within normal limits.  AST 24, ALT 18, alkaline phosphatase 87, total bilirubin 0.4.  Albumin 4.2.       Assessment:    Lisa Johnston is a 81 year old woman with a new diagnosis of pelvic mass.     A total of 60 minutes was spent on patient care today.      Plan:     1.)    Pelvic mass-We reviewed possible etiologies including benign, malignant and borderline.  We reviewed role of  and this will be obtained today.  We reviewed her CT showing a low risk cyst of the left ovary without evidence of metastatic disease.  We reviewed the rationale for not performing a biopsy of the ovary.  We discussed options for repeat imaging vs immediate surgical resection. " She has had several members of her family with cancer and also two good friends who have  of ovarian cancer and thus prefers to proceed with immediate surgery.  We reviewed the role of frozen section analysis and that further surgical decisions would be based on these findings.  We will also plan cystoscopy regardless of pathology given her recurrent UTI.  Risks, benefits, indications and alternatives were discussed with the patient.  Her questions were answered and she will undergo laparoscopic removal of both ovaries and fallopian tubes, possible cancer surgery, cystoscopy on .  She is on chronic steroids with 10mg of prednisone daily and will discuss with her PCP if she needs stress dose steroids.    2.) Genetic risk factors were assessed and the patient does not meet the qualifications for a referral.      3.) Labs and/or tests ordered include:  , CBC, CMP, T/S, COVID, EKG     4.) Health maintenance issues addressed today include pt is due for a mammogram which will be deferred at this time.    5.) Pre-operative teaching was completed today        Thank you for allowing us to participate in the care of your patient.         Sincerely,    Radha Brizuela MD  Gynecologic Oncology  [facility]       [place]         Again, thank you for allowing me to participate in the care of your patient.        Sincerely,        Pablito Brizuela MD

## 2021-11-09 NOTE — NURSING NOTE
"Oncology Rooming Note    November 9, 2021 1:13 PM   Lisa Johnston is a 81 year old female who presents for:    Chief Complaint   Patient presents with     Oncology Clinic Visit     New patient      Initial Vitals: BP (!) 153/81   Pulse 92   Temp 98.4  F (36.9  C) (Tympanic)   Resp 16   Ht 1.581 m (5' 2.25\")   Wt 90.7 kg (199 lb 14.4 oz)   SpO2 93%   BMI 36.27 kg/m   Estimated body mass index is 36.27 kg/m  as calculated from the following:    Height as of this encounter: 1.581 m (5' 2.25\").    Weight as of this encounter: 90.7 kg (199 lb 14.4 oz). Body surface area is 2 meters squared.  Moderate Pain (4) Comment: Data Unavailable   No LMP recorded. Patient has had a hysterectomy.  Allergies reviewed: Yes  Medications reviewed: Yes    Medications: Medication refills not needed today.  Pharmacy name entered into [facility]: [id number] [place], [place] - [address] AT [address] [address] & [facility] [facility]    Clinical concerns: New Patient        Ainlsey Delgado CMA              "

## 2021-11-11 DIAGNOSIS — Z11.59 ENCOUNTER FOR SCREENING FOR OTHER VIRAL DISEASES: ICD-10-CM

## 2021-11-12 ENCOUNTER — TELEPHONE (OUTPATIENT)
Dept: ONCOLOGY | Facility: CLINIC | Age: 81
End: 2021-11-12
Payer: MEDICARE

## 2021-11-13 NOTE — TELEPHONE ENCOUNTER
RNCC: Mitzi Deluna; [phone], option 4     Surgery is scheduled with Dr. Brizuela on 12/1 at [facility] .  Scheduled per orders.    H&P to be completed by Surgeon.    COVID-19 test: 11/[facility] at [facility]    Post-op: will be scheduled by the clinic    The RN completed the education regarding the surgery.     Patient will receive a phone call from pre-admission nurses 1-2 days prior to surgery with arrival and start time.    The surgery packet was provided by the RN during appointment    Patient will complete COVID-19 test that was scheduled by surgical coordinator 2-4 days prior to surgery.     I spoke with the patient and was able to confirm the scheduled information. No further action needed at this time.

## 2021-11-15 NOTE — PROGRESS NOTES
Late entry from 11/9/2021:  Met with pt after MD visit in clinic for surgical education.  Pt scheduled for laparoscopic removal of both ovaries and fallopian tubes with Dr Brizuela on 12/1/2021. Labs and EKG completed on 11/9/2021 and Covid test scheduled for 11/29/2021.  Pt verbalized understanding of all instructions.  See pt education for detailed note.    Mitzi Deluna RN, BSN, OCN

## 2021-11-23 ENCOUNTER — TRANSFERRED RECORDS (OUTPATIENT)
Dept: MULTI SPECIALTY CLINIC | Facility: CLINIC | Age: 81
End: 2021-11-23
Payer: MEDICARE

## 2021-11-23 LAB
CREATININE (EXTERNAL): 1.25 MG/DL (ref 0.51–0.95)
GFR ESTIMATED (EXTERNAL): 40 ML/MIN/1.73M2
GLUCOSE (EXTERNAL): 93 MG/DL (ref 74–100)
POTASSIUM (EXTERNAL): 4.3 MMOL/L (ref 3.5–5.1)

## 2021-11-29 ENCOUNTER — LAB (OUTPATIENT)
Dept: LAB | Facility: CLINIC | Age: 81
End: 2021-11-29
Payer: MEDICARE

## 2021-11-29 DIAGNOSIS — Z11.59 ENCOUNTER FOR SCREENING FOR OTHER VIRAL DISEASES: ICD-10-CM

## 2021-11-29 PROCEDURE — U0003 INFECTIOUS AGENT DETECTION BY NUCLEIC ACID (DNA OR RNA); SEVERE ACUTE RESPIRATORY SYNDROME CORONAVIRUS 2 (SARS-COV-2) (CORONAVIRUS DISEASE [COVID-19]), AMPLIFIED PROBE TECHNIQUE, MAKING USE OF HIGH THROUGHPUT TECHNOLOGIES AS DESCRIBED BY CMS-2020-01-R: HCPCS

## 2021-11-29 PROCEDURE — U0005 INFEC AGEN DETEC AMPLI PROBE: HCPCS

## 2021-11-30 ENCOUNTER — ANESTHESIA EVENT (OUTPATIENT)
Dept: SURGERY | Facility: CLINIC | Age: 81
End: 2021-11-30
Payer: MEDICARE

## 2021-11-30 LAB — SARS-COV-2 RNA RESP QL NAA+PROBE: NEGATIVE

## 2021-12-01 ENCOUNTER — ANESTHESIA (OUTPATIENT)
Dept: SURGERY | Facility: CLINIC | Age: 81
End: 2021-12-01
Payer: MEDICARE

## 2021-12-01 ENCOUNTER — HOSPITAL ENCOUNTER (OUTPATIENT)
Facility: CLINIC | Age: 81
Discharge: HOME | End: 2021-12-01
Attending: OBSTETRICS & GYNECOLOGY | Admitting: OBSTETRICS & GYNECOLOGY
Payer: MEDICARE

## 2021-12-01 VITALS
OXYGEN SATURATION: 94 % | RESPIRATION RATE: 20 BRPM | HEART RATE: 65 BPM | BODY MASS INDEX: 36.44 KG/M2 | WEIGHT: 198 LBS | DIASTOLIC BLOOD PRESSURE: 74 MMHG | HEIGHT: 62 IN | TEMPERATURE: 96.9 F | SYSTOLIC BLOOD PRESSURE: 148 MMHG

## 2021-12-01 DIAGNOSIS — Z98.890 STATUS POST LAPAROSCOPY: Primary | ICD-10-CM

## 2021-12-01 DIAGNOSIS — N94.89 ADNEXAL MASS: Primary | ICD-10-CM

## 2021-12-01 LAB
ABO/RH(D): NORMAL
ANTIBODY SCREEN: NEGATIVE
SPECIMEN EXPIRATION DATE: NORMAL

## 2021-12-01 PROCEDURE — 88112 CYTOPATH CELL ENHANCE TECH: CPT | Mod: TC | Performed by: OBSTETRICS & GYNECOLOGY

## 2021-12-01 PROCEDURE — 36415 COLL VENOUS BLD VENIPUNCTURE: CPT | Performed by: OBSTETRICS & GYNECOLOGY

## 2021-12-01 PROCEDURE — 250N000013 HC RX MED GY IP 250 OP 250 PS 637: Performed by: OBSTETRICS & GYNECOLOGY

## 2021-12-01 PROCEDURE — 250N000011 HC RX IP 250 OP 636: Performed by: OBSTETRICS & GYNECOLOGY

## 2021-12-01 PROCEDURE — 250N000011 HC RX IP 250 OP 636: Performed by: NURSE ANESTHETIST, CERTIFIED REGISTERED

## 2021-12-01 PROCEDURE — 250N000025 HC SEVOFLURANE, PER MIN: Performed by: OBSTETRICS & GYNECOLOGY

## 2021-12-01 PROCEDURE — 999N000141 HC STATISTIC PRE-PROCEDURE NURSING ASSESSMENT: Performed by: OBSTETRICS & GYNECOLOGY

## 2021-12-01 PROCEDURE — 250N000013 HC RX MED GY IP 250 OP 250 PS 637: Performed by: STUDENT IN AN ORGANIZED HEALTH CARE EDUCATION/TRAINING PROGRAM

## 2021-12-01 PROCEDURE — 710N000009 HC RECOVERY PHASE 1, LEVEL 1, PER MIN: Performed by: OBSTETRICS & GYNECOLOGY

## 2021-12-01 PROCEDURE — 258N000003 HC RX IP 258 OP 636: Performed by: NURSE ANESTHETIST, CERTIFIED REGISTERED

## 2021-12-01 PROCEDURE — 272N000001 HC OR GENERAL SUPPLY STERILE: Performed by: OBSTETRICS & GYNECOLOGY

## 2021-12-01 PROCEDURE — 250N000011 HC RX IP 250 OP 636: Performed by: ANESTHESIOLOGY

## 2021-12-01 PROCEDURE — 250N000013 HC RX MED GY IP 250 OP 250 PS 637: Performed by: ANESTHESIOLOGY

## 2021-12-01 PROCEDURE — 250N000009 HC RX 250: Performed by: NURSE ANESTHETIST, CERTIFIED REGISTERED

## 2021-12-01 PROCEDURE — 370N000017 HC ANESTHESIA TECHNICAL FEE, PER MIN: Performed by: OBSTETRICS & GYNECOLOGY

## 2021-12-01 PROCEDURE — 88331 PATH CONSLTJ SURG 1 BLK 1SPC: CPT | Mod: TC | Performed by: OBSTETRICS & GYNECOLOGY

## 2021-12-01 PROCEDURE — 86900 BLOOD TYPING SEROLOGIC ABO: CPT | Performed by: OBSTETRICS & GYNECOLOGY

## 2021-12-01 PROCEDURE — 710N000012 HC RECOVERY PHASE 2, PER MINUTE: Performed by: OBSTETRICS & GYNECOLOGY

## 2021-12-01 PROCEDURE — 360N000076 HC SURGERY LEVEL 3, PER MIN: Performed by: OBSTETRICS & GYNECOLOGY

## 2021-12-01 PROCEDURE — 258N000003 HC RX IP 258 OP 636: Performed by: ANESTHESIOLOGY

## 2021-12-01 RX ORDER — AMOXICILLIN 250 MG
1-2 CAPSULE ORAL 2 TIMES DAILY PRN
Qty: 60 TABLET | Refills: 0 | Status: SHIPPED | OUTPATIENT
Start: 2021-12-01

## 2021-12-01 RX ORDER — OXYCODONE HYDROCHLORIDE 5 MG/1
5 TABLET ORAL EVERY 4 HOURS PRN
Status: DISCONTINUED | OUTPATIENT
Start: 2021-12-01 | End: 2021-12-01 | Stop reason: HOSPADM

## 2021-12-01 RX ORDER — FENTANYL CITRATE 50 UG/ML
INJECTION, SOLUTION INTRAMUSCULAR; INTRAVENOUS PRN
Status: DISCONTINUED | OUTPATIENT
Start: 2021-12-01 | End: 2021-12-01

## 2021-12-01 RX ORDER — PHENAZOPYRIDINE HYDROCHLORIDE 200 MG/1
200 TABLET, FILM COATED ORAL ONCE
Status: COMPLETED | OUTPATIENT
Start: 2021-12-01 | End: 2021-12-01

## 2021-12-01 RX ORDER — PROPOFOL 10 MG/ML
INJECTION, EMULSION INTRAVENOUS PRN
Status: DISCONTINUED | OUTPATIENT
Start: 2021-12-01 | End: 2021-12-01

## 2021-12-01 RX ORDER — DEXAMETHASONE SODIUM PHOSPHATE 4 MG/ML
INJECTION, SOLUTION INTRA-ARTICULAR; INTRALESIONAL; INTRAMUSCULAR; INTRAVENOUS; SOFT TISSUE PRN
Status: DISCONTINUED | OUTPATIENT
Start: 2021-12-01 | End: 2021-12-01

## 2021-12-01 RX ORDER — OXYCODONE HYDROCHLORIDE 5 MG/1
5 TABLET ORAL EVERY 6 HOURS PRN
Qty: 6 TABLET | Refills: 0 | Status: SHIPPED | OUTPATIENT
Start: 2021-12-01 | End: 2021-12-04

## 2021-12-01 RX ORDER — ONDANSETRON 2 MG/ML
INJECTION INTRAMUSCULAR; INTRAVENOUS PRN
Status: DISCONTINUED | OUTPATIENT
Start: 2021-12-01 | End: 2021-12-01

## 2021-12-01 RX ORDER — FENTANYL CITRATE 0.05 MG/ML
25 INJECTION, SOLUTION INTRAMUSCULAR; INTRAVENOUS
Status: CANCELLED | OUTPATIENT
Start: 2021-12-01

## 2021-12-01 RX ORDER — HYDROMORPHONE HCL IN WATER/PF 6 MG/30 ML
0.2 PATIENT CONTROLLED ANALGESIA SYRINGE INTRAVENOUS EVERY 5 MIN PRN
Status: DISCONTINUED | OUTPATIENT
Start: 2021-12-01 | End: 2021-12-01 | Stop reason: HOSPADM

## 2021-12-01 RX ORDER — ACETAMINOPHEN 325 MG/1
325-650 TABLET ORAL EVERY 6 HOURS PRN
Qty: 100 TABLET | Refills: 0 | Status: SHIPPED | OUTPATIENT
Start: 2021-12-01 | End: 2022-04-13

## 2021-12-01 RX ORDER — ONDANSETRON 2 MG/ML
4 INJECTION INTRAMUSCULAR; INTRAVENOUS EVERY 30 MIN PRN
Status: DISCONTINUED | OUTPATIENT
Start: 2021-12-01 | End: 2021-12-01 | Stop reason: HOSPADM

## 2021-12-01 RX ORDER — SODIUM CHLORIDE, SODIUM LACTATE, POTASSIUM CHLORIDE, CALCIUM CHLORIDE 600; 310; 30; 20 MG/100ML; MG/100ML; MG/100ML; MG/100ML
INJECTION, SOLUTION INTRAVENOUS CONTINUOUS
Status: DISCONTINUED | OUTPATIENT
Start: 2021-12-01 | End: 2021-12-01 | Stop reason: HOSPADM

## 2021-12-01 RX ORDER — FENTANYL CITRATE 0.05 MG/ML
25 INJECTION, SOLUTION INTRAMUSCULAR; INTRAVENOUS EVERY 5 MIN PRN
Status: DISCONTINUED | OUTPATIENT
Start: 2021-12-01 | End: 2021-12-01 | Stop reason: HOSPADM

## 2021-12-01 RX ORDER — ONDANSETRON 4 MG/1
4 TABLET, ORALLY DISINTEGRATING ORAL EVERY 30 MIN PRN
Status: DISCONTINUED | OUTPATIENT
Start: 2021-12-01 | End: 2021-12-01 | Stop reason: HOSPADM

## 2021-12-01 RX ORDER — HEPARIN SODIUM 5000 [USP'U]/.5ML
5000 INJECTION, SOLUTION INTRAVENOUS; SUBCUTANEOUS
Status: COMPLETED | OUTPATIENT
Start: 2021-12-01 | End: 2021-12-01

## 2021-12-01 RX ORDER — GLYCOPYRROLATE 0.2 MG/ML
INJECTION, SOLUTION INTRAMUSCULAR; INTRAVENOUS PRN
Status: DISCONTINUED | OUTPATIENT
Start: 2021-12-01 | End: 2021-12-01

## 2021-12-01 RX ORDER — PROPOFOL 10 MG/ML
INJECTION, EMULSION INTRAVENOUS CONTINUOUS PRN
Status: DISCONTINUED | OUTPATIENT
Start: 2021-12-01 | End: 2021-12-01

## 2021-12-01 RX ORDER — LIDOCAINE HYDROCHLORIDE 20 MG/ML
INJECTION, SOLUTION INFILTRATION; PERINEURAL PRN
Status: DISCONTINUED | OUTPATIENT
Start: 2021-12-01 | End: 2021-12-01

## 2021-12-01 RX ORDER — NEOSTIGMINE METHYLSULFATE 1 MG/ML
VIAL (ML) INJECTION PRN
Status: DISCONTINUED | OUTPATIENT
Start: 2021-12-01 | End: 2021-12-01

## 2021-12-01 RX ADMIN — LIDOCAINE HYDROCHLORIDE 80 MG: 20 INJECTION, SOLUTION INFILTRATION; PERINEURAL at 09:42

## 2021-12-01 RX ADMIN — FENTANYL CITRATE 25 MCG: 50 INJECTION, SOLUTION INTRAMUSCULAR; INTRAVENOUS at 11:07

## 2021-12-01 RX ADMIN — PHENAZOPYRIDINE HYDROCHLORIDE 200 MG: 200 TABLET ORAL at 08:05

## 2021-12-01 RX ADMIN — PROPOFOL 180 MG: 10 INJECTION, EMULSION INTRAVENOUS at 09:42

## 2021-12-01 RX ADMIN — DEXAMETHASONE SODIUM PHOSPHATE 4 MG: 4 INJECTION, SOLUTION INTRA-ARTICULAR; INTRALESIONAL; INTRAMUSCULAR; INTRAVENOUS; SOFT TISSUE at 09:50

## 2021-12-01 RX ADMIN — ONDANSETRON 4 MG: 2 INJECTION INTRAMUSCULAR; INTRAVENOUS at 09:50

## 2021-12-01 RX ADMIN — HYDROMORPHONE HYDROCHLORIDE 0.2 MG: 0.2 INJECTION, SOLUTION INTRAMUSCULAR; INTRAVENOUS; SUBCUTANEOUS at 12:17

## 2021-12-01 RX ADMIN — SODIUM CHLORIDE, POTASSIUM CHLORIDE, SODIUM LACTATE AND CALCIUM CHLORIDE: 600; 310; 30; 20 INJECTION, SOLUTION INTRAVENOUS at 08:06

## 2021-12-01 RX ADMIN — PHENYLEPHRINE HYDROCHLORIDE 50 MCG: 10 INJECTION INTRAVENOUS at 09:51

## 2021-12-01 RX ADMIN — FENTANYL CITRATE 100 MCG: 50 INJECTION, SOLUTION INTRAMUSCULAR; INTRAVENOUS at 09:42

## 2021-12-01 RX ADMIN — HEPARIN SODIUM 5000 UNITS: 5000 INJECTION, SOLUTION INTRAVENOUS; SUBCUTANEOUS at 08:06

## 2021-12-01 RX ADMIN — PROPOFOL 20 MG: 10 INJECTION, EMULSION INTRAVENOUS at 10:02

## 2021-12-01 RX ADMIN — NEOSTIGMINE METHYLSULFATE 5 MG: 1 INJECTION, SOLUTION INTRAVENOUS at 10:40

## 2021-12-01 RX ADMIN — PROPOFOL 25 MCG/KG/MIN: 10 INJECTION, EMULSION INTRAVENOUS at 09:56

## 2021-12-01 RX ADMIN — GLYCOPYRROLATE 0.8 MG: 0.2 INJECTION, SOLUTION INTRAMUSCULAR; INTRAVENOUS at 10:40

## 2021-12-01 RX ADMIN — FENTANYL CITRATE 25 MCG: 50 INJECTION, SOLUTION INTRAMUSCULAR; INTRAVENOUS at 11:33

## 2021-12-01 RX ADMIN — FENTANYL CITRATE 25 MCG: 50 INJECTION, SOLUTION INTRAMUSCULAR; INTRAVENOUS at 11:43

## 2021-12-01 RX ADMIN — HYDROMORPHONE HYDROCHLORIDE 0.3 MG: 1 INJECTION, SOLUTION INTRAMUSCULAR; INTRAVENOUS; SUBCUTANEOUS at 10:14

## 2021-12-01 RX ADMIN — HYDROMORPHONE HYDROCHLORIDE 0.2 MG: 0.2 INJECTION, SOLUTION INTRAMUSCULAR; INTRAVENOUS; SUBCUTANEOUS at 11:58

## 2021-12-01 RX ADMIN — HYDROMORPHONE HYDROCHLORIDE 0.2 MG: 1 INJECTION, SOLUTION INTRAMUSCULAR; INTRAVENOUS; SUBCUTANEOUS at 10:10

## 2021-12-01 RX ADMIN — OXYCODONE HYDROCHLORIDE 5 MG: 5 TABLET ORAL at 14:09

## 2021-12-01 RX ADMIN — FENTANYL CITRATE 25 MCG: 50 INJECTION, SOLUTION INTRAMUSCULAR; INTRAVENOUS at 11:12

## 2021-12-01 RX ADMIN — ROCURONIUM BROMIDE 35 MG: 50 INJECTION, SOLUTION INTRAVENOUS at 09:42

## 2021-12-01 RX ADMIN — PHENYLEPHRINE HYDROCHLORIDE 100 MCG: 10 INJECTION INTRAVENOUS at 09:42

## 2021-12-01 ASSESSMENT — MIFFLIN-ST. JEOR: SCORE: 1306.37

## 2021-12-01 ASSESSMENT — LIFESTYLE VARIABLES: TOBACCO_USE: 0

## 2021-12-01 NOTE — DISCHARGE INSTRUCTIONS
Same Day Surgery Discharge Instructions for  Sedation and General Anesthesia       It's not unusual to feel dizzy, light-headed or faint for up to 24 hours after surgery or while taking pain medication.  If you have these symptoms: sit for a few minutes before standing and have someone assist you when you get up to walk or use the bathroom.      You should rest and relax for the next 24 hours. We recommend you make arrangements to have an adult stay with you for at least 24 hours after your discharge.  Avoid hazardous and strenuous activity.      DO NOT DRIVE any vehicle or operate mechanical equipment for 24 hours following the end of your surgery.  Even though you may feel normal, your reactions may be affected by the medication you have received.      Do not drink alcoholic beverages for 24 hours following surgery.       Slowly progress to your regular diet as you feel able. It's not unusual to feel nauseated and/or vomit after receiving anesthesia.  If you develop these symptoms, drink clear liquids (apple juice, ginger ale, broth, 7-up, etc. ) until you feel better.  If your nausea and vomiting persists for 24 hours, please notify your surgeon.        All narcotic pain medications, along with inactivity and anesthesia, can cause constipation. Drinking plenty of liquids and increasing fiber intake will help.      For any questions of a medical nature, call your surgeon.      Do not make important decisions for 24 hours.      If you had general anesthesia, you may have a sore throat for a couple of days related to the breathing tube used during surgery.  You may use Cepacol lozenges to help with this discomfort.  If it worsens or if you develop a fever, contact your surgeon.       If you feel your pain is not well managed with the pain medications prescribed by your surgeon, please contact your surgeon's office to let them know so they can address your concerns.       CoVid 19 Information    We want to give you  information regarding Covid. Please consult your primary care provider with any questions you might have.     Patient who have symptoms (cough, fever, or shortness of breath), need to isolate for 7 days from when symptoms started OR 72 hours after fever resolves (without fever reducing medications) AND improvement of respiratory symptoms (whichever is longer).      Isolate yourself at home (in own room/own bathroom if possible)    Do Not allow any visitors    Do Not go to work or school    Do Not go to [employer],  centers, shopping, or other public places.    Do Not shake hands.    Avoid close and intimate contact with others (hugging, kissing).    Follow CDC recommendations for household cleaning of frequently touched services.     After the initial 7 days, continue to isolate yourself from household members as much as possible. To continue decrease the risk of community spread and exposure, you and any members of your household should limit activities in public for 14 days after starting home isolation.     You can reference the following CDC link for helpful home isolation/care tips:  https://www.cdc.gov/coronavirus/2019-ncov/downloads/10Things.pdf    Protect Others:    Cover Your Mouth and Nose with a mask, disposable tissue or wash cloth to avoid spreading germs to others.    Wash your hands and face frequently with soap and water    Call Your Primary Doctor If: Breathing difficulty develops or you become worse.    For more information about COVID19 and options for caring for yourself at home, please visit the CDC website at https://www.cdc.gov/coronavirus/2019-ncov/about/steps-when-sick.html  For more options for care at [facility], please visit our website at [email or web address]

## 2021-12-01 NOTE — ANESTHESIA CARE TRANSFER NOTE
Patient: Lisa Johnston    Procedure: Procedure(s):  Laparoscopic removal of both ovaries and fallopian tubes  cystoscopy       Diagnosis: Adnexal mass [N94.89]  Diagnosis Additional Information: No value filed.    Anesthesia Type:   General     Note:    Oropharynx: oropharynx clear of all foreign objects  Level of Consciousness: awake and drowsy  Oxygen Supplementation: face mask  Level of Supplemental Oxygen (L/min / FiO2): 6  Independent Airway: airway patency satisfactory and stable  Dentition: dentition unchanged  Vital Signs Stable: post-procedure vital signs reviewed and stable  Report to RN Given: handoff report given  Patient transferred to: PACU  Comments: To PACU: Arouses easily, good airway, 02 face mask, VSS  Report to RN  Handoff Report: Identifed the Patient, Identified the Reponsible Provider, Reviewed the pertinent medical history, Discussed the surgical course, Reviewed Intra-OP anesthesia mangement and issues during anesthesia, Set expectations for post-procedure period and Allowed opportunity for questions and acknowledgement of understanding      Vitals:  Vitals Value Taken Time   BP     Temp     Pulse 80 12/01/21 1100   Resp 15 12/01/21 1100   SpO2 98 % 12/01/21 1100   Vitals shown include unvalidated device data.    Electronically Signed By: AMADOR Mckeon CRNA  December 1, 2021  11:01 AM

## 2021-12-01 NOTE — ANESTHESIA POSTPROCEDURE EVALUATION
Patient: Lisa Johnston    Procedure: Procedure(s):  Laparoscopic removal of both ovaries and fallopian tubes  cystoscopy       Diagnosis:Adnexal mass [N94.89]  Diagnosis Additional Information: No value filed.    Anesthesia Type:  General    Note:  Disposition: Outpatient   Postop Pain Control: Uneventful            Sign Out: Well controlled pain   PONV: No   Neuro/Psych: Uneventful            Sign Out: Acceptable/Baseline neuro status   Airway/Respiratory: Uneventful            Sign Out: Acceptable/Baseline resp. status   CV/Hemodynamics: Uneventful            Sign Out: Acceptable CV status; No obvious hypovolemia; No obvious fluid overload   Other NRE: NONE   DID A NON-ROUTINE EVENT OCCUR?            Last vitals:  Vitals Value Taken Time   /36 12/01/21 1215   Temp     Pulse 60 12/01/21 1223   Resp 22 12/01/21 1223   SpO2 92 % 12/01/21 1223   Vitals shown include unvalidated device data.    Electronically Signed By: Jeannie Damon  December 1, 2021  2:43 PM

## 2021-12-01 NOTE — BRIEF OP NOTE
[facility]    Brief Operative Note    Pre-operative diagnosis: Adnexal mass [N94.89]  Post-operative diagnosis Cystadenofibroma on frozen pathology    Procedure: Procedure(s):  Laparoscopic removal of both ovaries and fallopian tubes  cystoscopy  Surgeon: Surgeon(s) and Role:     * Radha Rush MD - Primary  Anesthesia: General   Estimated Blood Loss: Less than 10 ml    Drains: None  Specimens:   ID Type Source Tests Collected by Time Destination   1 : LEFT OVARY AND FALLOPIAN TUBE Tissue Ovary and Fallopian Tube, Left SURGICAL PATHOLOGY EXAM Radha Rush MD 12/1/2021  9:53 AM    2 : RIGHT OVARY AND FALLOPIAN TUBE Tissue Ovary and Fallopian Tube, Right SURGICAL PATHOLOGY EXAM Rdaha Rush MD 12/1/2021  9:53 AM    3 :  Washings Pelvis NON-GYNECOLOGIC CYTOLOGY Radha Rush MD 12/1/2021  9:54 AM      Findings:     - On laparoscopy, no signs of trauma on entry, mild adhesions between omentum and abdominal wall, grossly normal liver. Many diverticula. Surgically absent uterus. Left ovary with multiple small cyst both fluid filled and firm. Left adnexa adherent to left pelvic sidewall. Normal appearing bilateral fallopian tubes and right ovary.   - Cystoscopy with no signs of bladder trauma and patent ureters with urinary efflux visible from both orifices. Bladder epithelium somewhat inflamed, with many trabeculae. White fluffy debris noted throughout bladder.     Complications: None.  Implants: * No implants in log *     Janes Ovalles MD MPH  OB/Gyn PGY-3  12/01/21 10:49 AM

## 2021-12-01 NOTE — OP NOTE
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     ANESTHESIA: General endotracheal    ESTIMATED BLOOD LOSS: 20 cc     IV FLUIDS: See anesthesia record    URINE OUTPUT: 500 cc clear urine     INDICATIONS: Lisa Johnston is a 81 year old who was undergoing workup for recurrent UTI symptoms.  She thus underwent a CT which revealed a 3.9 cm left adnexal cyst but was otherwise negative.  She had a  drawn which was normal at 21.  Following a thorough discussion of the risks, benefits, indications and alternatives she consented to the above procedure.    FINDINGS: On EUA the patient had normal external genitalia and no palpable pelvic masses.  On laparoscopy the right ovary and fallopian tube were grossly normal in appearance and the uterus was surgically absent.  The left ovary was replaced by a multi-cystic lesion which was adherent to the left pelvic sidewall and the vaginal cuff.  There were mild adhesions in the right upper quadrant but the abdominal and pelvic surveys were otherwise unremarkable.  Frozen section results showed benign findings.  On cystoscopy there were several bladder trabeculations and a moderate amount of debris but no masses and there was brisk efflux from the bilateral ureteral orifices.  The urethra was normal appearing on cystoscopy.    SPECIMENS:   1.  Pelvic washings  2.  Right and left ovaries and fallopian tubes    COMPLICATIONS: None.     CONDITION: Stable to PACU.     PROCEDURE:  Consent was reviewed with the patient in the preoperative setting and confirmed. She received heparin for venous thrombosis prevention. The patient was transferred  to the operating room and placed in dorsal supine position. General anesthetic was obtained in the usual manner without noted difficulties. The patient was then positioned onto Anthony stirrups and an exam under anesthesia was performed with findings as described above.  The patient was prepped and draped for the above-mentioned procedure and Rehman catheter was then placed under sterile techniques.  Timeout was called at which point the patient's name, procedure and operative site was confirmed by the operative team. Attention was then turned to the upper abdomen. Initially, an incision was made at the umbilicus and the Veress needle introduced through this stab incision. The abdomen was insufflated with an opening pressure of 2 mmHg. The Veress needle was removed. The initial midline 5 mm port was inserted without difficulties and initial survey revealed no damage to underlying structures.  The left lateral 12 mm and right lateral 5 mm ports were then placed under direct visualization without any injury noted to underlying structures. At this point, the patient was placed into steep Trendelenburg. The pelvis was inspected as well as the upper abdomen with findings as noted above. Pelvic washings were obtained and sent for cytology. Bowels were packed up into the upper abdomen with gentle traction. The left retroperitoneum was opened and the left ureter was identified.  The IP ligament was isolated, cauterized and divided well above the level of the ureter.  The left ovary was then freed by dissecting the adhesions from the ovary to the left pelvic sidewall and to the vaginal cuff which sharp dissection.  The left ovary and fallopian tube were then placed into an EndoCatch bag, removed from the abdomen and sent for frozen section analysis which did return as benign.  The right ureter was then identified transperitoneally.  The right IP ligament was cauterized and divided with the LigaSure device.  The right ovary and  fallopian tube were removed from the abdomen and sent for pathology.  We closed the fascia on the 12 mm incision using the Ja-Clark device. All laparoscopic instruments and ports were now removed and CO2 allowed to escape from the abdomen. Skin was reapproximated with 4-0 Vicryl sutures and Dermabond applied. We then performed cystoscopy with the findings as above.  The patient tolerated the procedure well and was taken to the recovery room in stable condition.    Sponge, lap and needle counts were reported as correct x2 and instrument count was correct x1.       Radha Brizuela MD  Gynecologic Oncology  [facility]

## 2021-12-01 NOTE — ANESTHESIA PREPROCEDURE EVALUATION
Anesthesia Pre-Procedure Evaluation    Patient: Lisa Johnston   MRN: [id number] : 1940        Preoperative Diagnosis: Adnexal mass [N94.89]    Procedure : Procedure(s):  Laparoscopic removal of both ovaries and fallopian tubes, possible cancer surgery  cystoscopy          Past Medical History:   Diagnosis Date     Arthritis     psoriatic arthritis     Gastro-oesophageal reflux disease      Hypertension      Noninfectious ileitis     IBS     Uncomplicated asthma      Unspecified cerebral artery occlusion with cerebral infarction           Past Surgical History:   Procedure Laterality Date     APPENDECTOMY       ARTHROSCOPY SHOULDER RT/LT      right x2, left x1     COLONOSCOPY       CRYOTHERAPY  10/30/2013    Procedure: CRYOTHERAPY;;  Surgeon: Ramírez Warner MD;  [facility]     DECOMPRESSION, FUSION LUMBAR POSTERIOR ONE LEVEL, COMBINED Bilateral 2018    Procedure: COMBINED DECOMPRESSION, FUSION LUMBAR POSTERIOR ONE LEVEL;  BILATERAL L4-5 DECOMPRESSION, L4-5 POSTERIOR SPINAL FUSION WITH MEDTRONIC INSTRUMENTATION, RIGHT ILIAC CREST BONE GRAFT ;  Surgeon: Price Prajapati MD;  [facility]     GENITOURINARY SURGERY      vaginal repair     GRAFT BONE FROM ILIAC CREST Right 2018    Procedure: GRAFT BONE FROM ILIAC CREST;;  Surgeon: Price Prajapati MD;  Location:  [facility]     ORTHOPEDIC SURGERY Left     bunionectomy x1     ORTHOPEDIC SURGERY      left foot pinning, right foot pinning     AK TOTAL ABDOM HYSTERECTOMY       REPAIR BLADDER       REVERSE ARTHROPLASTY SHOULDER Right 2018    Procedure: 1.  Right reverse shoulder arthroplasty.  2.  Biceps tenodesis. ;  Surgeon: Mamadou Chen MD;  Location:  OR     VITRECTOMY PARSPLANA WITH 23 GAUGE SYSTEM  10/30/2013    Procedure: VITRECTOMY PARSPLANA WITH 23 GAUGE SYSTEM;  RIGHT 23 GAUGE PARSPLANA VITRECTOMY, ENDOLASER, AIR FLUID EXCHANGE, INFUSION 30% SF6 GAS, CRYOTHERAPY;  Surgeon: Ramírez Warner MD;  Location: [facility]       Allergies   Allergen Reactions     Penicillins Anaphylaxis and Difficulty breathing     Tolerated Ceftriaxone 10/11/2021     Adhesive Tape Blisters     Gluten Meal      Hmg-Coa-R Inhibitors Other (See Comments)     Leg pain     Levaquin [Levofloxacin]      Joint pain     Sulfa Drugs Itching     Latex Rash      Social History     Tobacco Use     Smoking status: Never Smoker     Smokeless tobacco: Never Used   Substance Use Topics     Alcohol use: No      Wt Readings from Last 1 Encounters:   12/01/21 89.8 kg (198 lb)        Anesthesia Evaluation   Pt has had prior anesthetic.     No history of anesthetic complications       ROS/MED HX  ENT/Pulmonary:     (+) asthma  (-) tobacco use   Neurologic:     (+) CVA, TIA,     Cardiovascular:     (+) hypertension-----    METS/Exercise Tolerance:     Hematologic:       Musculoskeletal:       GI/Hepatic:     (+) GERD, Asymptomatic on medication,     Renal/Genitourinary:       Endo:       Psychiatric/Substance Use:       Infectious Disease:       Malignancy:       Other:            Physical Exam    Airway        Mallampati: II   TM distance: > 3 FB   Neck ROM: full   Mouth opening: > 3 cm    Respiratory Devices and Support         Dental  no notable dental history         Cardiovascular   cardiovascular exam normal          Pulmonary   pulmonary exam normal                OUTSIDE LABS:  CBC:   Lab Results   Component Value Date    WBC 16.9 (H) 11/09/2021    WBC 14.8 (H) 10/11/2021    HGB 13.9 11/09/2021    HGB 13.3 10/11/2021    HCT 41.7 11/09/2021    HCT 38.5 10/11/2021     11/09/2021     10/11/2021     BMP:   Lab Results   Component Value Date     11/09/2021     10/11/2021    POTASSIUM 4.1 11/09/2021    POTASSIUM 4.0 10/11/2021    CHLORIDE 102 11/09/2021    CHLORIDE 104 10/11/2021    CO2 25 11/09/2021    CO2 24 10/11/2021    BUN 30 11/09/2021    BUN 18 10/11/2021    CR 1.33 (H) 11/09/2021    CR 1.0 10/11/2021     (H) 11/09/2021     (H)  10/11/2021     COAGS: No results found for: PTT, INR, FIBR  POC:   Lab Results   Component Value Date     (H) 05/30/2018     HEPATIC:   Lab Results   Component Value Date    ALBUMIN 4.2 11/09/2021    PROTTOTAL 8.3 11/09/2021    ALT 24 11/09/2021    AST 18 11/09/2021    ALKPHOS 87 11/09/2021    BILITOTAL 0.4 11/09/2021     OTHER:   Lab Results   Component Value Date    LACT 1.5 10/11/2021    CHEPE 9.6 11/09/2021       Anesthesia Plan    ASA Status:  3      Anesthesia Type: General.     - Airway: ETT   Induction: Intravenous.   Maintenance: Balanced.        Consents            Postoperative Care    Pain management: IV analgesics, Oral pain medications.   PONV prophylaxis: Ondansetron (or other 5HT-3), Dexamethasone or Solumedrol, Background Propofol Infusion     Comments:                Jeannie Damon

## 2021-12-01 NOTE — ANESTHESIA PROCEDURE NOTES
Airway       Patient location during procedure: OR       Procedure Start/Stop Times: 12/1/2021 9:45 AM  Staff -        Performed By: CRNAIndications and Patient Condition       Indications for airway management: ana-procedural       Induction type:intravenous       Mask difficulty assessment: 2 - vent by mask + OA or adjuvant +/- NMBA    Final Airway Details       Final airway type: endotracheal airway       Successful airway: ETT - single and Oral  Endotracheal Airway Details        ETT size (mm): 7.0       Cuffed: yes       Successful intubation technique: video laryngoscopy       VL Blade Size: Glidescope 3       Grade View of Cords: 1       Adjucts: stylet       Position: Right       Measured from: lips       Secured at (cm): 21       Bite block used: Soft    Post intubation assessment        Placement verified by: capnometry, equal breath sounds and chest rise        Number of attempts at approach: 1       Secured with: pink tape       Ease of procedure: easy       Dentition: Intact and Unchanged

## 2021-12-02 ENCOUNTER — PATIENT OUTREACH (OUTPATIENT)
Dept: ONCOLOGY | Facility: CLINIC | Age: 81
End: 2021-12-02
Payer: MEDICARE

## 2021-12-02 NOTE — PROGRESS NOTES
Pt s/p Laparoscopic BSO on 12/1/2021 with Dr Brizuela. Pt states she is doing okay post surgery, reports sore throat and informed this is related to breathing tube during surgery and this discomfort should resolve in a few days.  Pt instructed to keep up with fluids, tea, warm water or throat lozenges which pt states she has tried and helpful.  Pt also with intermittent loose cough since surgery, denies fevers or sob.  Eating okay and keeping up with water intake, 64 oz today.  Normal bladder function but no bowel movement yet and not passing gas.  Pt took Senna S 2 tabs last night and instructed she may take another 2 tabs now. Increased pain in left side of abdomen but relief noted with intermittent dose of oxycodone alternating with tylenol. Support given to pt and instructed to continue to monitor symptoms and call back if pain worsening or no bowel movement within 2 days or further concerns at any time.  Pt aware of post op appt as scheduled with Dr Brizuela and pt verbalized understanding of all instructions.     Mitzi Deluna, RN, BSN, OCN

## 2021-12-03 LAB
PATH REPORT.COMMENTS IMP SPEC: NORMAL
PATH REPORT.FINAL DX SPEC: NORMAL
PATH REPORT.GROSS SPEC: NORMAL

## 2021-12-03 PROCEDURE — 88112 CYTOPATH CELL ENHANCE TECH: CPT | Mod: 26 | Performed by: PATHOLOGY

## 2021-12-03 PROCEDURE — 88305 TISSUE EXAM BY PATHOLOGIST: CPT | Mod: 26 | Performed by: PATHOLOGY

## 2021-12-06 ENCOUNTER — HOSPITAL ENCOUNTER (EMERGENCY)
Facility: CLINIC | Age: 81
Discharge: HOME | End: 2021-12-06
Attending: EMERGENCY MEDICINE | Admitting: EMERGENCY MEDICINE
Payer: MEDICARE

## 2021-12-06 ENCOUNTER — APPOINTMENT (OUTPATIENT)
Dept: CT IMAGING | Facility: CLINIC | Age: 81
End: 2021-12-06
Attending: EMERGENCY MEDICINE
Payer: MEDICARE

## 2021-12-06 ENCOUNTER — APPOINTMENT (OUTPATIENT)
Dept: GENERAL RADIOLOGY | Facility: CLINIC | Age: 81
End: 2021-12-06
Attending: EMERGENCY MEDICINE
Payer: MEDICARE

## 2021-12-06 VITALS
OXYGEN SATURATION: 98 % | HEART RATE: 70 BPM | RESPIRATION RATE: 16 BRPM | SYSTOLIC BLOOD PRESSURE: 174 MMHG | TEMPERATURE: 98.1 F | DIASTOLIC BLOOD PRESSURE: 81 MMHG

## 2021-12-06 DIAGNOSIS — S09.90XA INJURY OF HEAD, INITIAL ENCOUNTER: ICD-10-CM
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
## 2021-12-06 DIAGNOSIS — S80.02XA CONTUSION OF LEFT KNEE, INITIAL ENCOUNTER: ICD-10-CM

## 2021-12-06 DIAGNOSIS — S00.11XA PERIORBITAL ECCHYMOSIS OF RIGHT EYE, INITIAL ENCOUNTER: ICD-10-CM

## 2021-12-06 LAB
PATH REPORT.COMMENTS IMP SPEC: NORMAL
PATH REPORT.COMMENTS IMP SPEC: NORMAL
PATH REPORT.FINAL DX SPEC: NORMAL
PATH REPORT.GROSS SPEC: NORMAL
PATH REPORT.INTRAOP OBS SPEC DOC: NORMAL
PATH REPORT.MICROSCOPIC SPEC OTHER STN: NORMAL
PATH REPORT.RELEVANT HX SPEC: NORMAL
PHOTO IMAGE: NORMAL

## 2021-12-06 PROCEDURE — 250N000013 HC RX MED GY IP 250 OP 250 PS 637: Performed by: EMERGENCY MEDICINE

## 2021-12-06 PROCEDURE — 88307 TISSUE EXAM BY PATHOLOGIST: CPT | Mod: 26 | Performed by: PATHOLOGY

## 2021-12-06 PROCEDURE — 70486 CT MAXILLOFACIAL W/O DYE: CPT | Mod: MG

## 2021-12-06 PROCEDURE — 88331 PATH CONSLTJ SURG 1 BLK 1SPC: CPT | Mod: 26 | Performed by: PATHOLOGY

## 2021-12-06 PROCEDURE — 73562 X-RAY EXAM OF KNEE 3: CPT | Mod: RT

## 2021-12-06 PROCEDURE — 99285 EMERGENCY DEPT VISIT HI MDM: CPT | Mod: 25

## 2021-12-06 PROCEDURE — 70450 CT HEAD/BRAIN W/O DYE: CPT | Mod: ME

## 2021-12-06 PROCEDURE — 88305 TISSUE EXAM BY PATHOLOGIST: CPT | Mod: 26 | Performed by: PATHOLOGY

## 2021-12-06 RX ORDER — ACETAMINOPHEN 500 MG
500 TABLET ORAL EVERY 4 HOURS PRN
Status: DISCONTINUED | OUTPATIENT
Start: 2021-12-06 | End: 2021-12-06 | Stop reason: HOSPADM

## 2021-12-06 RX ADMIN — ACETAMINOPHEN 500 MG: 500 TABLET, FILM COATED ORAL at 15:02

## 2021-12-06 ASSESSMENT — ENCOUNTER SYMPTOMS
DIARRHEA: 1
CONSTIPATION: 1
NECK PAIN: 1
BACK PAIN: 1

## 2021-12-06 NOTE — ED PROVIDER NOTES
History   Chief Complaint:  Fall    The history is provided by the patient.      Lisa Johnston is a 81 year old female with history of  Hypertension and kidney disease who presents with a fall. She had her ovaries and fallopian tubes removed on 12/01 and has been very sleepy post surgery due to the anesthesia used. Prior to the surgery scans showed a cyst on the left ovary that was a golf ball size. On Friday morning at 0230 she was sitting down on the toilet  when she noticed some soft stool. She decided to wait longer for more to come out and notes that she has been having some difficulty getting stool to move through her intestines. While she was waiting she fell asleep and fell face forward on the floor. Her right eye brow is very tender and she is concerned about her retinal detachment. She notes no changes to her vision.  Her knees also hit the ground and her right knee feels the worse. She notes that she is having right shoulder pain and right sided neck soreness, She has been taking 325 mg of aspirin.      Review of Systems   HENT:        Edema   Gastrointestinal: Positive for constipation and diarrhea.   Musculoskeletal: Positive for back pain and neck pain.        Knee pain     All other systems reviewed and are negative.        Allergies:  Penicillins  Adhesive Tape  Gluten Meal  Hmg-Coa-R Inhibitors  Levaquin [Levofloxacin]  Sulfa Drugs  Latex    Medications:  Alpha-Lipoic Acid  aspirin (ASA) 325 MG  Barberry-Oreg Grape-Goldenseal  Cetirizine HCl   Coenzyme Q10   FOLIC ACID PO  KRILL OIL PO  MELATONIN PO  METOPROLOL SUCCINATE ER PO  senna-docusate   spironolactone   topiramate   TRIMETHOPRIM PO  TURMERIC PO  naloxone HCl   Gabapentin  calcium carbonate-vitamin D   Allopurinol  topiramate      Past Medical History:     Arthritis   Gastro-oesophagea reflux disease  Hypertension  Noninfectious iletis  Uncomplicated asthma  Unspecified cerebral artery occlusion with cerebral infarction  Spinal  stenosis  S/P reverse total shoulder arthroplasty, right  Adnexal mass  H/O statin myopathy  Sciatica  ASCVD (arteriosclerotic cardiovascular disease)  Stage 3a chronic kidney disease  Obesity  Recurrent UTI  Tremors of nervous system  Chronic pain disorder        Past Surgical History:    BUNIONECTOMY  APPENDECTOMY  HYSTERECTOMY  TONSILLECTOMY  SHOULDER ARTHROSCOPY  PILONIDAL CYST EXCISION  FOOT SURGERY  VAGINA SURGERY  ARTHROSCOPY OF JOINT  CYST REMOVAL  GENITOURINARY SURGERY  GRAFT BONE FROM ILIAC CREST  LAPAROSCOPIC SALPINGO-OOPHORECTOMY   ORTHOPEDIC SURGERY  COLONOSCOPY   CRYOTHERAPY   DECOMPRESSION, FUSION LUMBAR POSTERIOR ONE LEVEL, COMBINED  GENITOURINARY SURGERY  REPAIR BLADDER  REVERSE ARTHROPLASTY SHOULDER  VITRECTOMY PARSPLANA WITH 23 GAUGE SYSTEM         Family History:    CVD  Bladder cancer  Coronary Artery Disease    Social History:  Presents alone  PCP: Wilfred Medina    Physical Exam     Patient Vitals for the past 24 hrs:   BP Temp Temp src Pulse Resp SpO2   12/06/21 1505 -- -- -- -- -- 98 %   12/06/21 1504 -- -- -- -- -- 97 %   12/06/21 1503 (!) 174/81 -- -- 70 -- --   12/06/21 1122 (!) 179/88 98.1  F (36.7  C) Oral 77 16 97 %       Physical Exam  General: The patient is alert, in no respiratory distress.    HENT: Mucous membranes moist.  violet ecchymosis on right and lower eyelids and over the bridge of nose and medial to left eye. Subchorionic hemorrhage on the right sclera, extra ocular motions intact. Right face is tender to palpitation.    Cardiovascular: Regular rate and rhythm. Good pulses in all four extremities. Normal capillary refill and skin turgor.     Respiratory: Lungs are clear. No nasal flaring. No retractions. No wheezing, no crackles.    Gastrointestinal: Abdomen soft. No guarding, no rebound. No palpable hernias.     Musculoskeletal: No gross deformity. Tenderness on right knee, minimum shoulder tenderness.    Skin: No rashes or petechiae.     Neurologic: The patient is alert  and oriented x3. GCS 15. No testable cranial nerve deficit. Follows commands with clear and appropriate speech. Gives appropriate answers. Good strength in all extremities. No gross neurologic deficit. Gross sensation intact. Pupils are round and reactive. No meningismus.     Lymphatic: No cervical adenopathy. No lower extremity swelling.    Psychiatric: The patient is non-tearful.      Emergency Department Course   Imaging:  CT Facial Bones without Contrast   Final Result   IMPRESSION:    1. Right frontal scalp contusion.   2. Right infraorbital preseptal soft tissue contusion.   3. No evidence of acute facial fracture.      RAINER ROSS MD            SYSTEM ID:  [id number]      XR Knee Right 3 Views   Final Result   IMPRESSION:   1.  No fracture or joint malalignment.   2.  Mild right knee degenerative arthrosis with medial compartment   narrowing.   3.  Small right knee joint effusion.          SHEKHAR HOLDEN MD            SYSTEM ID:  [id number]      CT Head w/o Contrast   Final Result   IMPRESSION:       1. No acute intracranial abnormality.   2. Right frontal scalp contusion.   3. Old right occipital lobe infarct.      RAINER ROSS MD            SYSTEM ID:  [id number]        Report per radiology    Laboratory:  Labs Ordered and Resulted from Time of ED Arrival to Time of ED Departure - No data to display       Emergency Department Course:    Reviewed:  I reviewed nursing notes, vitals, past medical history and Care Everywhere    Assessments:  1346 I obtained history and examined the patient as noted above.   1446 I rechecked the patient and explained findings.       Interventions:  1502    Tylenol, 500 mg, PO     Disposition:  The patient was discharged to home.     Impression & Plan     Medical Decision Making:  The patient does not take blood thinners other than aspirin.  She recently had been in the hospital and not been sleeping well and I think that is why she fell asleep on the commode.  She did fall  asleep fell forward and hit her face.  This did happen 3 days ago but there is extensive ecchymosis around her right eye.  Her funduscopic exam and gross vision is intact.  Did not feel she likely had another retinal detachment as she has had in the past.  I was concerned about periorbital fractures her CT scan here of her head and orbits are negative.  She had some pain of her knee there is no signs of fracture.  Patient was discharged home we discussed symptomatic treatment.  I also stressed the revision should change increasing pain or lotion she would need to return she is otherwise stable.        Diagnosis:    ICD-10-CM    1. Fall, initial encounter  W19.XXXA    2. Injury of head, initial encounter  S09.90XA    3. Periorbital ecchymosis of right eye, initial encounter  S00.11XA    4. Contusion of left knee, initial encounter  S80.02XA        Discharge Medications:  Discharge Medication List as of 12/6/2021  3:10 PM          Scribe Disclosure:  I, Babatunde Nava, am serving as a scribe at 1:46 PM on 12/6/2021 to document services personally performed by David Sanford MD based on my observations and the provider's statements to me.              David Sanford MD  12/06/21 [id number]

## 2021-12-06 NOTE — DISCHARGE INSTRUCTIONS
Discharge Instructions  Trauma    You were seen today for an injury due to some kind of trauma (crash, fall, etc.).  Some injuries may not show up until after you leave the Emergency Department.  It is important that you pay attention to these instructions and follow-up with your regular doctor as instructed.    Return to the Emergency Department right away if:    You have abdominal pain or bruises, chest pain, pain in a new area, or pain that is getting worse.    You get short of breath.    You develop a fever over 101 degrees.    You have weakness in your arms or legs.    You faint or you are very lightheaded.    You have any new symptoms, you are feeling weak or unusually ill, or something worries you.     Injuries to the brain are possible with any accident.  Return right away if you have confusion, vomiting more than once, difficulty walking or a headache that is getting worse.      MORE INFORMATION:    General Injuries:    Aches and pains are usually worse the day after your accident, but should not be severe, and should start getting better after that. Aches and pains are common in the neck and back.    Pain medications or your injuries may make it unsafe for you to drive or operate machinery.    Use ice to injured areas for the first one or two days. Apply a bag of ice wrapped in a cloth for about 15 minutes at a time. You can do this as often as once an hour. Do not sleep with an ice pack, since it can burn you.     You can use non-prescription pain medicine, like Tylenol  (acetaminophen), Advil  (ibuprofen), Motrin  (ibuprofen), Nuprin  (ibuprofen) if your emergency doctor or your own doctor told you this is okay. Tylenol  (acetaminophen) is in many prescription medicines and non-prescription medicines--check all of your medicines to be sure you aren t taking more than 3000 mg per day.    Limit your activity for at least one or two days. Avoid doing things that hurt.    You need to see your doctor if any  injured area is not back to normal in 1 week.    Note about Imaging: If you had IMAGING done today, they were read by your emergency physician. They will also be read later by a radiologist. We will contact you if the radiologist thinks they show something different than the emergency physician did. Remember that there are some fractures (breaks in the bone) that can t be seen right away. Even if your IMAGES today were normal, you must see your doctor in clinic to re-check.     Most injuries are preventable!  As your local emergency physicians, we encourage you to:    Wear your seat belt.    Do not talk on your cell phone while driving.    Do not read or send text messages while driving.    Wear a bike or motorcycle helmet.    Wear a helmet while skiing and snowboarding.    Wear personal flotation devices at all times while on the water.    Always have your child in a car seat.    Do not allow children less than 12 years old to ride in the front seat.    Go to the CDC website to find more information on preventing injures:  http://www.cdc.gov/injury/index.html    If you were given a prescription for medicine here today, be sure to read all of the information (including the package insert) that comes with your prescription.  This will include important information about the medicine, its side effects, and any warnings that you need to know about.  The pharmacist who fills the prescription can provide more information and answer questions you may have about the medicine.  If you have questions or concerns that the pharmacist cannot address, please call or return to the Emergency Department.     Remember that you can always come back to the Emergency Department if you are not able to see your regular doctor in the amount of time listed above, if you get any new symptoms, or if there is anything that worries you.

## 2021-12-06 NOTE — ED TRIAGE NOTES
Patient presents with concerns for a fall. Patient fell Friday morning while using the restroom. Patient fell asleep on the toilet causing her to fall forward and hit her head and knees on the bath room tile. No LOC. On 325 mg ASA. No vision changes. Ecchymosis surrounding right eye. 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

## 2021-12-13 ENCOUNTER — VIRTUAL VISIT (OUTPATIENT)
Dept: ONCOLOGY | Facility: CLINIC | Age: 81
End: 2021-12-13
Attending: OBSTETRICS & GYNECOLOGY
Payer: MEDICARE

## 2021-12-13 DIAGNOSIS — D27.1: Primary | ICD-10-CM

## 2021-12-13 DIAGNOSIS — Z12.31 VISIT FOR SCREENING MAMMOGRAM: ICD-10-CM

## 2021-12-13 PROCEDURE — 99213 OFFICE O/P EST LOW 20 MIN: CPT | Mod: 95 | Performed by: OBSTETRICS & GYNECOLOGY

## 2021-12-13 NOTE — LETTER
2021         RE: Lisa Johnston  [address]  [facility] [address]        Dear Colleague,    Thank you for referring your patient, Lisa Johnston, to the [facility]. Please see a copy of my visit note below.    ok    Consult Notes on Referred Patient        Dr. Tanner Sun MD  [address] [place] [address]       RE: Lisa Johnston  : 1940  TORREY: Dec 13, 2021    HPI:  Lisa Johnston is 81 year old with benign findings. She is unaccompanied today.  She is eating and drinking well.  No bowel/bladder concerns.  Minimal pain and is not using pain medications any more.  No incisional concerns.  She did have a fall from the toilet while at home but was evaluated in the ED without significant injuries discovered.    Patient initially presented to urgent care with UTI symptoms.  She reports she has had recurrent UTI and her PCP thus sent her to the ED for evaluation.  She was noted to have an incidental pelvic mass.  She reports she is now on suppression for UTI with nitrofurantoin.      10/11/21:  CT A/P (Personally reviewed):  1.  Diverticulosis left colon, without evidence for diverticulitis. 2.  Bilobed 3.9 x 2.5 cm cystic lesion left adnexa, indeterminate. 3.  Small bilateral renal cysts and probable tiny cysts in the liver. 4.  Fatty infiltration of the liver. 5.  Gallstone.    21:  Laparoscopic bilateral salpingo-oophorectomy, lysis of adhesions for 15 minutes, cystoscopy   Pathology: Serous cystadenofibroma      Obstetrics and Gynecology History:  ,  x 5, 3 living children-one SIDS death, one infant death  ERT use x 10 year      Past Medical History:  Past Medical History:   Diagnosis Date     Arthritis     psoriatic arthritis     Gastro-oesophageal reflux disease      Hypertension      Noninfectious ileitis     IBS     Uncomplicated asthma      Unspecified cerebral artery occlusion with cerebral infarction            Past  Surgical History:  Past Surgical History:   Procedure Laterality Date     APPENDECTOMY       ARTHROSCOPY SHOULDER RT/LT      right x2, left x1     COLONOSCOPY       CRYOTHERAPY  10/30/2013    Procedure: CRYOTHERAPY;;  Surgeon: Ramírez Warner MD;  Location: [facility]     CYSTOSCOPY N/A 12/1/2021    Procedure: cystoscopy;  Surgeon: Radha Rush MD;  Location:  OR     DECOMPRESSION, FUSION LUMBAR POSTERIOR ONE LEVEL, COMBINED Bilateral 05/30/2018    Procedure: COMBINED DECOMPRESSION, FUSION LUMBAR POSTERIOR ONE LEVEL;  BILATERAL L4-5 DECOMPRESSION, L4-5 POSTERIOR SPINAL FUSION WITH MEDTRONIC INSTRUMENTATION, RIGHT ILIAC CREST BONE GRAFT ;  Surgeon: Price Prajapati MD;  Location:  OR     GENITOURINARY SURGERY      vaginal repair     GRAFT BONE FROM ILIAC CREST Right 05/30/2018    Procedure: GRAFT BONE FROM ILIAC CREST;;  Surgeon: Price Prajapati MD;  Location:  OR     LAPAROSCOPIC SALPINGO-OOPHORECTOMY Bilateral 12/1/2021    Procedure: Laparoscopic removal of both ovaries and fallopian tubes;  Surgeon: Radha Rush MD;  Location:  OR     ORTHOPEDIC SURGERY Left     bunionectomy x1     ORTHOPEDIC SURGERY      left foot pinning, right foot pinning     AL TOTAL ABDOM HYSTERECTOMY       REPAIR BLADDER       REVERSE ARTHROPLASTY SHOULDER Right 12/05/2018    Procedure: 1.  Right reverse shoulder arthroplasty.  2.  Biceps tenodesis. ;  Surgeon: Mamadou Chen MD;  Location:  OR     VITRECTOMY PARSPLANA WITH 23 GAUGE SYSTEM  10/30/2013    Procedure: VITRECTOMY PARSPLANA WITH 23 GAUGE SYSTEM;  RIGHT 23 GAUGE PARSPLANA VITRECTOMY, ENDOLASER, AIR FLUID EXCHANGE, INFUSION 30% SF6 GAS, CRYOTHERAPY;  Surgeon: Ramírez Warner MD;  Location: [facility]       Health Maintenance:  Last Pap Smear: Prior to hysterectomy  She has not had a history of abnormal Pap smears.    Last Mammogram: 12/19/19              Result: normal      She has not had a history of abnormal mammograms.    Last  Colonoscopy: 4/10/18              Result: Polyps-repeat 5 years       Social History:  Lives with her , feels safe at home.  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  Does not have an advanced directive on file and would like her  to be her POA.  Would like full resuscitation if reversible cause is identified, however would not like to be kept on life sustaining measures long-term.     Family History:   The patient's family history is significant for.  Family History   Problem Relation Age of Onset     Cerebrovascular Disease Mother          age 77     Bladder Cancer Father          age 42     Coronary Artery Disease Sister      Colon Cancer Brother          age 61     Breast Cancer Daughter      Uterine Cancer Maternal Aunt      Breast Cancer Maternal Aunt          Physical Exam:   None     Labs:  none       Assessment:    Lisa Johnston is a 81 year old woman with a serous cystadenofibroma.     A total of 10 minutes was spent on patient care today.      Plan:     1.)    Serous cystadenofibroma-Pathology was reviewed showing benign findings.  Given this she no longer needs follow up with the cancer clinic.  She should continue to have routine exams including an annual pelvic exam with her PCP.    2.) Genetic risk factors were assessed and the patient does not meet the qualifications for a referral.      3.) Labs and/or tests ordered include:  Mammogram     4.) Health maintenance issues addressed today include pt is due for a mammogram which she will schedule            Thank you for allowing us to participate in the care of your patient.         Sincerely,    Radha Brizuela MD  Gynecologic Oncology  [facility]       [facility] [facility]         Again, thank you for allowing me to participate in the care of your patient.        Sincerely,        Pablito Brizuela MD

## 2021-12-13 NOTE — LETTER
2021         RE: Lisa Johnston  [address]  [facility] [address]        Dear Colleague,    Thank you for referring your patient, Lisa Johnston, to the [facility]. Please see a copy of my visit note below.    ok    Consult Notes on Referred Patient        Dr. Tanner Sun MD  [address] [place] [address]       RE: Lisa Johnston  : 1940  TORREY: Dec 13, 2021    HPI:  Lisa Johnston is 81 year old with benign findings. She is unaccompanied today.  She is eating and drinking well.  No bowel/bladder concerns.  Minimal pain and is not using pain medications any more.  No incisional concerns.  She did have a fall from the toilet while at home but was evaluated in the ED without significant injuries discovered.    Patient initially presented to urgent care with UTI symptoms.  She reports she has had recurrent UTI and her PCP thus sent her to the ED for evaluation.  She was noted to have an incidental pelvic mass.  She reports she is now on suppression for UTI with nitrofurantoin.      10/11/21:  CT A/P (Personally reviewed):  1.  Diverticulosis left colon, without evidence for diverticulitis. 2.  Bilobed 3.9 x 2.5 cm cystic lesion left adnexa, indeterminate. 3.  Small bilateral renal cysts and probable tiny cysts in the liver. 4.  Fatty infiltration of the liver. 5.  Gallstone.    21:  Laparoscopic bilateral salpingo-oophorectomy, lysis of adhesions for 15 minutes, cystoscopy   Pathology: Serous cystadenofibroma      Obstetrics and Gynecology History:  ,  x 5, 3 living children-one SIDS death, one infant death  ERT use x 10 year      Past Medical History:  Past Medical History:   Diagnosis Date     Arthritis     psoriatic arthritis     Gastro-oesophageal reflux disease      Hypertension      Noninfectious ileitis     IBS     Uncomplicated asthma      Unspecified cerebral artery occlusion with cerebral infarction            Past  Surgical History:  Past Surgical History:   Procedure Laterality Date     APPENDECTOMY       ARTHROSCOPY SHOULDER RT/LT      right x2, left x1     COLONOSCOPY       CRYOTHERAPY  10/30/2013    Procedure: CRYOTHERAPY;;  Surgeon: Ramírez Warner MD;  Location: [facility]     CYSTOSCOPY N/A 12/1/2021    Procedure: cystoscopy;  Surgeon: Radha Rush MD;  Location:  OR     DECOMPRESSION, FUSION LUMBAR POSTERIOR ONE LEVEL, COMBINED Bilateral 05/30/2018    Procedure: COMBINED DECOMPRESSION, FUSION LUMBAR POSTERIOR ONE LEVEL;  BILATERAL L4-5 DECOMPRESSION, L4-5 POSTERIOR SPINAL FUSION WITH MEDTRONIC INSTRUMENTATION, RIGHT ILIAC CREST BONE GRAFT ;  Surgeon: Price Prajapati MD;  Location:  OR     GENITOURINARY SURGERY      vaginal repair     GRAFT BONE FROM ILIAC CREST Right 05/30/2018    Procedure: GRAFT BONE FROM ILIAC CREST;;  Surgeon: Price Prajapati MD;  Location:  OR     LAPAROSCOPIC SALPINGO-OOPHORECTOMY Bilateral 12/1/2021    Procedure: Laparoscopic removal of both ovaries and fallopian tubes;  Surgeon: Radha Rush MD;  Location:  OR     ORTHOPEDIC SURGERY Left     bunionectomy x1     ORTHOPEDIC SURGERY      left foot pinning, right foot pinning     TN TOTAL ABDOM HYSTERECTOMY       REPAIR BLADDER       REVERSE ARTHROPLASTY SHOULDER Right 12/05/2018    Procedure: 1.  Right reverse shoulder arthroplasty.  2.  Biceps tenodesis. ;  Surgeon: Mamadou Chen MD;  Location:  OR     VITRECTOMY PARSPLANA WITH 23 GAUGE SYSTEM  10/30/2013    Procedure: VITRECTOMY PARSPLANA WITH 23 GAUGE SYSTEM;  RIGHT 23 GAUGE PARSPLANA VITRECTOMY, ENDOLASER, AIR FLUID EXCHANGE, INFUSION 30% SF6 GAS, CRYOTHERAPY;  Surgeon: Ramírez Warner MD;  Location: [facility]       Health Maintenance:  Last Pap Smear: Prior to hysterectomy  She has not had a history of abnormal Pap smears.    Last Mammogram: 12/19/19              Result: normal      She has not had a history of abnormal mammograms.    Last  Colonoscopy: 4/10/18              Result: Polyps-repeat 5 years       Social History:  Lives with her , feels safe at home.  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  Does not have an advanced directive on file and would like her  to be her POA.  Would like full resuscitation if reversible cause is identified, however would not like to be kept on life sustaining measures long-term.     Family History:   The patient's family history is significant for.  Family History   Problem Relation Age of Onset     Cerebrovascular Disease Mother          age 77     Bladder Cancer Father          age 42     Coronary Artery Disease Sister      Colon Cancer Brother          age 61     Breast Cancer Daughter      Uterine Cancer Maternal Aunt      Breast Cancer Maternal Aunt          Physical Exam:   None     Labs:  none       Assessment:    Lisa Johnston is a 81 year old woman with a serous cystadenofibroma.     A total of 10 minutes was spent on patient care today.      Plan:     1.)    Serous cystadenofibroma-Pathology was reviewed showing benign findings.  Given this she no longer needs follow up with the cancer clinic.  She should continue to have routine exams including an annual pelvic exam with her PCP.    2.) Genetic risk factors were assessed and the patient does not meet the qualifications for a referral.      3.) Labs and/or tests ordered include:  Mammogram     4.) Health maintenance issues addressed today include pt is due for a mammogram which she will schedule            Thank you for allowing us to participate in the care of your patient.         Sincerely,    Radha Brizuela MD  Gynecologic Oncology  [facility]       [facility]         Again, thank you for allowing me to participate in the care of your patient.        Sincerely,        Pablito Brizuela MD

## 2021-12-13 NOTE — PATIENT INSTRUCTIONS
Mammogram at pt convenience - scheduled 1/19/22    Cynthia Camara, RN, BSN    RN Care Coordinator  [facility]  [phone]

## 2021-12-13 NOTE — PROGRESS NOTES
Consult Notes on Referred Patient        Dr. Tanner Sun MD  [address] [place] [address]       RE: Lisa Johnston  : 1940  TORREY: Dec 13, 2021    HPI:  Lisa Johnston is 81 year old with benign findings. She is unaccompanied today.  She is eating and drinking well.  No bowel/bladder concerns.  Minimal pain and is not using pain medications any more.  No incisional concerns.  She did have a fall from the toilet while at home but was evaluated in the ED without significant injuries discovered.    Patient initially presented to urgent care with UTI symptoms.  She reports she has had recurrent UTI and her PCP thus sent her to the ED for evaluation.  She was noted to have an incidental pelvic mass.  She reports she is now on suppression for UTI with nitrofurantoin.      10/11/21:  CT A/P (Personally reviewed):  1.  Diverticulosis left colon, without evidence for diverticulitis. 2.  Bilobed 3.9 x 2.5 cm cystic lesion left adnexa, indeterminate. 3.  Small bilateral renal cysts and probable tiny cysts in the liver. 4.  Fatty infiltration of the liver. 5.  Gallstone.    21:  Laparoscopic bilateral salpingo-oophorectomy, lysis of adhesions for 15 minutes, cystoscopy   Pathology: Serous cystadenofibroma      Obstetrics and Gynecology History:  ,  x 5, 3 living children-one SIDS death, one infant death  ERT use x 10 year      Past Medical History:  Past Medical History:   Diagnosis Date     Arthritis     psoriatic arthritis     Gastro-oesophageal reflux disease      Hypertension      Noninfectious ileitis     IBS     Uncomplicated asthma      Unspecified cerebral artery occlusion with cerebral infarction            Past Surgical History:  Past Surgical History:   Procedure Laterality Date     APPENDECTOMY       ARTHROSCOPY SHOULDER RT/LT      right x2, left x1     COLONOSCOPY       CRYOTHERAPY  10/30/2013    Procedure: CRYOTHERAPY;;  Surgeon: Ramírez Warner MD;  Location:   [facility]     CYSTOSCOPY N/A 12/1/2021    Procedure: cystoscopy;  Surgeon: Radha Rush MD;  Location:  OR     DECOMPRESSION, FUSION LUMBAR POSTERIOR ONE LEVEL, COMBINED Bilateral 05/30/2018    Procedure: COMBINED DECOMPRESSION, FUSION LUMBAR POSTERIOR ONE LEVEL;  BILATERAL L4-5 DECOMPRESSION, L4-5 POSTERIOR SPINAL FUSION WITH MEDTRONIC INSTRUMENTATION, RIGHT ILIAC CREST BONE GRAFT ;  Surgeon: Price Prajapati MD;  Location:  OR     GENITOURINARY SURGERY      vaginal repair     GRAFT BONE FROM ILIAC CREST Right 05/30/2018    Procedure: GRAFT BONE FROM ILIAC CREST;;  Surgeon: Price Prajapati MD;  Location:  OR     LAPAROSCOPIC SALPINGO-OOPHORECTOMY Bilateral 12/1/2021    Procedure: Laparoscopic removal of both ovaries and fallopian tubes;  Surgeon: Radha Rush MD;  Location:  OR     ORTHOPEDIC SURGERY Left     bunionectomy x1     ORTHOPEDIC SURGERY      left foot pinning, right foot pinning     SC TOTAL ABDOM HYSTERECTOMY       REPAIR BLADDER       REVERSE ARTHROPLASTY SHOULDER Right 12/05/2018    Procedure: 1.  Right reverse shoulder arthroplasty.  2.  Biceps tenodesis. ;  Surgeon: Mamadou Chen MD;  Location:  OR     VITRECTOMY PARSPLANA WITH 23 GAUGE SYSTEM  10/30/2013    Procedure: VITRECTOMY PARSPLANA WITH 23 GAUGE SYSTEM;  RIGHT 23 GAUGE PARSPLANA VITRECTOMY, ENDOLASER, AIR FLUID EXCHANGE, INFUSION 30% SF6 GAS, CRYOTHERAPY;  Surgeon: Ramírez Warner MD;  Location: [facility]       Health Maintenance:  Last Pap Smear: Prior to hysterectomy  She has not had a history of abnormal Pap smears.    Last Mammogram: 12/19/19              Result: normal      She has not had a history of abnormal mammograms.    Last Colonoscopy: 4/10/18              Result: Polyps-repeat 5 years       Social History:  Lives with her , feels safe at home.  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  Does not have an advanced directive on file and would like her   to be her POA.  Would like full resuscitation if reversible cause is identified, however would not like to be kept on life sustaining measures long-term.     Family History:   The patient's family history is significant for.  Family History   Problem Relation Age of Onset     Cerebrovascular Disease Mother          age 77     Bladder Cancer Father          age 42     Coronary Artery Disease Sister      Colon Cancer Brother          age 61     Breast Cancer Daughter      Uterine Cancer Maternal Aunt      Breast Cancer Maternal Aunt          Physical Exam:   None     Labs:  none       Assessment:    Lisa Johnston is a 81 year old woman with a serous cystadenofibroma.     A total of 10 minutes was spent on patient care today.      Plan:     1.)    Serous cystadenofibroma-Pathology was reviewed showing benign findings.  Given this she no longer needs follow up with the cancer clinic.  She should continue to have routine exams including an annual pelvic exam with her PCP.    2.) Genetic risk factors were assessed and the patient does not meet the qualifications for a referral.      3.) Labs and/or tests ordered include:  Mammogram     4.) Health maintenance issues addressed today include pt is due for a mammogram which she will schedule            Thank you for allowing us to participate in the care of your patient.         Sincerely,    Radha Brizuela MD  Gynecologic Oncology  [facility]       CC  [place]

## 2022-01-19 ENCOUNTER — HOSPITAL ENCOUNTER (OUTPATIENT)
Dept: MAMMOGRAPHY | Facility: CLINIC | Age: 82
Discharge: HOME | End: 2022-01-19
Attending: OBSTETRICS & GYNECOLOGY | Admitting: OBSTETRICS & GYNECOLOGY
Payer: MEDICARE

## 2022-01-19 DIAGNOSIS — Z12.31 VISIT FOR SCREENING MAMMOGRAM: ICD-10-CM

## 2022-01-19 PROCEDURE — 77067 SCR MAMMO BI INCL CAD: CPT

## 2022-04-13 ENCOUNTER — OFFICE VISIT (OUTPATIENT)
Dept: INTERNAL MEDICINE | Facility: CLINIC | Age: 82
End: 2022-04-13
Payer: MEDICARE

## 2022-04-13 VITALS
WEIGHT: 190 LBS | HEIGHT: 62 IN | OXYGEN SATURATION: 98 % | HEART RATE: 72 BPM | DIASTOLIC BLOOD PRESSURE: 86 MMHG | SYSTOLIC BLOOD PRESSURE: 142 MMHG | BODY MASS INDEX: 34.96 KG/M2 | RESPIRATION RATE: 20 BRPM | TEMPERATURE: 98.2 F

## 2022-04-13 DIAGNOSIS — R25.1 TREMORS OF NERVOUS SYSTEM: ICD-10-CM

## 2022-04-13 DIAGNOSIS — Z00.00 ENCOUNTER FOR ANNUAL WELLNESS EXAM IN MEDICARE PATIENT: Primary | ICD-10-CM

## 2022-04-13 DIAGNOSIS — E78.5 HYPERLIPIDEMIA, UNSPECIFIED HYPERLIPIDEMIA TYPE: ICD-10-CM

## 2022-04-13 DIAGNOSIS — N18.31 STAGE 3A CHRONIC KIDNEY DISEASE (H): ICD-10-CM

## 2022-04-13 DIAGNOSIS — I10 HYPERTENSION, UNSPECIFIED TYPE: ICD-10-CM

## 2022-04-13 PROBLEM — M96.1 LUMBAR POST-LAMINECTOMY SYNDROME: Status: ACTIVE | Noted: 2019-10-11

## 2022-04-13 PROBLEM — N39.0 RECURRENT UTI: Status: ACTIVE | Noted: 2021-11-18

## 2022-04-13 PROBLEM — A49.9 ESBL (EXTENDED SPECTRUM BETA-LACTAMASE) PRODUCING BACTERIA INFECTION: Status: ACTIVE | Noted: 2021-05-01

## 2022-04-13 PROBLEM — K21.9 GERD (GASTROESOPHAGEAL REFLUX DISEASE): Status: ACTIVE | Noted: 2022-04-13

## 2022-04-13 PROBLEM — N83.202 CYSTS OF BOTH OVARIES: Status: ACTIVE | Noted: 2021-11-18

## 2022-04-13 PROBLEM — N83.201 CYSTS OF BOTH OVARIES: Status: ACTIVE | Noted: 2021-11-18

## 2022-04-13 PROBLEM — M47.812 CERVICAL SPONDYLOSIS: Status: ACTIVE | Noted: 2019-12-16

## 2022-04-13 PROBLEM — G89.4 CHRONIC PAIN DISORDER: Status: ACTIVE | Noted: 2021-10-05

## 2022-04-13 PROBLEM — Z16.12 ESBL (EXTENDED SPECTRUM BETA-LACTAMASE) PRODUCING BACTERIA INFECTION: Status: ACTIVE | Noted: 2021-05-01

## 2022-04-13 PROBLEM — Z96.89 S/P INSERTION OF SPINAL CORD STIMULATOR: Status: ACTIVE | Noted: 2020-11-19

## 2022-04-13 PROBLEM — I25.10 ASCVD (ARTERIOSCLEROTIC CARDIOVASCULAR DISEASE): Status: ACTIVE | Noted: 2021-03-22

## 2022-04-13 PROBLEM — E66.9 OBESITY: Status: ACTIVE | Noted: 2022-04-13

## 2022-04-13 PROCEDURE — G0439 PPPS, SUBSEQ VISIT: HCPCS | Performed by: INTERNAL MEDICINE

## 2022-04-13 PROCEDURE — 99214 OFFICE O/P EST MOD 30 MIN: CPT | Mod: 25 | Performed by: INTERNAL MEDICINE

## 2022-04-13 ASSESSMENT — ENCOUNTER SYMPTOMS
WEAKNESS: 0
SORE THROAT: 0
HEMATOCHEZIA: 0
JOINT SWELLING: 0
NERVOUS/ANXIOUS: 0
CONSTIPATION: 0
DIARRHEA: 0
NAUSEA: 0
FREQUENCY: 0
HEARTBURN: 0
DYSURIA: 0
SHORTNESS OF BREATH: 0
FEVER: 0
TREMORS: 1
COUGH: 0
CHILLS: 0
PALPITATIONS: 0
ARTHRALGIAS: 0
ABDOMINAL PAIN: 0
HEADACHES: 0
MYALGIAS: 0
EYE PAIN: 0
HEMATURIA: 0
PARESTHESIAS: 0
DIZZINESS: 0
BREAST MASS: 0

## 2022-04-13 ASSESSMENT — PATIENT HEALTH QUESTIONNAIRE - PHQ9
SUM OF ALL RESPONSES TO PHQ QUESTIONS 1-9: 0
10. IF YOU CHECKED OFF ANY PROBLEMS, HOW DIFFICULT HAVE THESE PROBLEMS MADE IT FOR YOU TO DO YOUR WORK, TAKE CARE OF THINGS AT HOME, OR GET ALONG WITH OTHER PEOPLE: NOT DIFFICULT AT ALL
SUM OF ALL RESPONSES TO PHQ QUESTIONS 1-9: 0

## 2022-04-13 ASSESSMENT — ACTIVITIES OF DAILY LIVING (ADL): CURRENT_FUNCTION: NO ASSISTANCE NEEDED

## 2022-04-13 NOTE — PROGRESS NOTES
"SUBJECTIVE:   Lisa Johnston is a 81 year old female who presents for Preventive Visit.      Patient has been advised of split billing requirements and indicates understanding: Yes  Are you in the first 12 months of your Medicare coverage?  No    Patient is an 81-year-old  female who presents to the clinic as a new patient for her annual wellness exam.  Patient has no major concerns or issues today.  She does report a history of hypertension, and she has been taking 25 mg of metoprolol succinate along with spironolactone 25 mg daily.  Patient has been on this medication regimen for extended period of time.  She also has a history of an essential tremor, and she was previously placed on Topamax 25 mg daily for this issue.  Patient reports that she has had tremendous improvement in her overall tremor since being placed on this medication.  Patient also goes on to state that she has had issues with chronic kidney disease, and she has her creatinine tested every few months.  Patient would like to have this checked at this time.  She also has a history of hyperlipidemia, but she has been managing this particular problem with diet.  Patient is not currently taking a statin.  She reports a stable affect.  Patient is stooling and voiding per her usual routine.  She is sleeping well at night.  Patient had no other questions or concerns at this time.    Healthy Habits:     In general, how would you rate your overall health?  Good    Frequency of exercise:  None    Do you usually eat at least 4 servings of fruit and vegetables a day, include whole grains    & fiber and avoid regularly eating high fat or \"junk\" foods?  Yes    Taking medications regularly:  Yes    Medication side effects:  None    Ability to successfully perform activities of daily living:  No assistance needed    Home Safety:  No safety concerns identified    Hearing Impairment:  No hearing concerns    In the past 6 months, have you been bothered by " leaking of urine?  No    In general, how would you rate your overall mental or emotional health?  Good      PHQ-2 Total Score: 0    Additional concerns today:  No    Do you feel safe in your environment? Yes    Have you ever done Advance Care Planning? (For example, a Health Directive, POLST, or a discussion with a medical provider or your loved ones about your wishes): No, advance care planning information given to patient to review.  Patient declined advance care planning discussion at this time.       Fall risk       Fallen two or more times in the last year   No   Any fall with an injury in the last year  No    Cognitive Screening   1) Repeat 3 items (Leader, Season, Table)      2) Clock draw:   NORMAL  3) 3 item recall:   Recalls 3 objects  Results: NORMAL clock, 1-2 items recalled: COGNITIVE IMPAIRMENT LESS LIKELY    Mini-CogTM Copyright S Drea. Licensed by the author for use in [facility]; reprinted with permission ([email or web address]). All rights reserved.      Do you have sleep apnea, excessive snoring or daytime drowsiness?: no    Reviewed and updated as needed this visit by clinical staff    Allergies                 Reviewed and updated as needed this visit by Provider                   Social History     Tobacco Use     Smoking status: Never Smoker     Smokeless tobacco: Never Used   Substance Use Topics     Alcohol use: No         Alcohol Use 4/13/2022   Prescreen: >3 drinks/day or >7 drinks/week? Not Applicable               Current providers sharing in care for this patient include:   Patient Care Team:  No Ref-Primary, Physician as PCP - Mitzi Bassett RN as Specialty Care Coordinator (Oncology)  Tanner Sun MD as Assigned OBGYN Provider  Radha Rush MD as Assigned Cancer Care Provider    The following health maintenance items are reviewed in Epic and correct as of today:  Health Maintenance Due   Topic Date Due     DEXA  Never done     ANNUAL REVIEW OF   "ORDERS  Never done     MEDICARE ANNUAL WELLNESS VISIT  Never done     FALL RISK ASSESSMENT  Never done     Lab work is in process    Mammogram Screening - Mammography discussed, not appropriate due to Age  Pertinent mammograms are reviewed under the imaging tab.    Review of Systems   Constitutional: Negative for chills and fever.   HENT: Negative for congestion, ear pain, hearing loss and sore throat.    Eyes: Negative for pain and visual disturbance.   Respiratory: Negative for cough and shortness of breath.    Cardiovascular: Negative for chest pain, palpitations and peripheral edema.   Gastrointestinal: Negative for abdominal pain, constipation, diarrhea, heartburn, hematochezia and nausea.   Breasts:  Negative for tenderness, breast mass and discharge.   Genitourinary: Negative for dysuria, frequency, genital sores, hematuria, pelvic pain, urgency, vaginal bleeding and vaginal discharge.   Musculoskeletal: Negative for arthralgias, joint swelling and myalgias.   Skin: Negative for rash.   Neurological: Positive for tremors. Negative for dizziness, weakness, headaches and paresthesias.   Psychiatric/Behavioral: Negative for mood changes. The patient is not nervous/anxious.        OBJECTIVE:    Estimated body mass index is 36.21 kg/m  as calculated from the following:    Height as of 12/1/21: 1.575 m (5' 2\").    Weight as of 12/1/21: 89.8 kg (198 lb).   Blood pressure (!) 142/86, pulse 72, temperature 98.2  F (36.8  C), resp. rate 20, height 1.575 m (5' 2\"), weight 86.2 kg (190 lb), SpO2 98 %, not currently breastfeeding.        Physical Exam  Vitals and nursing note reviewed.   Constitutional:       Appearance: Normal appearance. She is obese.   HENT:      Head: Normocephalic and atraumatic.      Right Ear: Tympanic membrane, ear canal and external ear normal.      Left Ear: Tympanic membrane, ear canal and external ear normal.      Mouth/Throat:      Mouth: Mucous membranes are moist.      Pharynx: Oropharynx " is clear.   Eyes:      Extraocular Movements: Extraocular movements intact.      Conjunctiva/sclera: Conjunctivae normal.      Pupils: Pupils are equal, round, and reactive to light.   Cardiovascular:      Rate and Rhythm: Normal rate and regular rhythm.      Pulses: Normal pulses.      Heart sounds: Normal heart sounds.   Pulmonary:      Effort: Pulmonary effort is normal.      Breath sounds: Normal breath sounds.   Abdominal:      General: Bowel sounds are normal.      Palpations: Abdomen is soft.   Skin:     General: Skin is warm.      Capillary Refill: Capillary refill takes less than 2 seconds.   Neurological:      Mental Status: She is alert and oriented to person, place, and time. Mental status is at baseline.      Coordination: Coordination abnormal (Occasional tremor in hands noted.).       Diagnostic Test Results: Future orders for CMP and lipid profile have been placed.    ASSESSMENT / PLAN:   (Z00.00) Encounter for annual wellness exam in Medicare patient  (primary encounter diagnosis)  Comment: Annual wellness plan reviewed and completed.    (N18.31) Stage 3a chronic kidney disease (H)  Comment: Review of her records that show that patient has previously been documented to have stage IIIa chronic kidney disease.  She will return at a later time for fasting labs for a repeat comprehensive metabolic panel collection.  We did discuss the need to avoid medications or other substances that could be harmful to her kidneys.  We will continue to monitor for any changes.  Plan: Comprehensive metabolic panel (BMP + Alb, Alk         Phos, ALT, AST, Total. Bili, TP), OFFICE/OUTPT         VISIT,EST,LEVL IV    (I10) Hypertension, unspecified type  Comment: Patient's repeat blood pressure was improved at 142/86.  She did wish to continue her metoprolol and spironolactone as currently prescribed.  She reports that she is not in need of any medication refills at this time.  We did spend some time discussing dietary  "modifications that can help keep her blood pressure under good control.  Patient was encouraged to monitor her blood pressure outside the clinic setting.  We also briefly discussed side effects associated with metoprolol and spironolactone use  Plan: Comprehensive metabolic panel (BMP + Alb, Alk         Phos, ALT, AST, Total. Bili, TP), OFFICE/OUTPT         VISIT,EST,LEVL IV            (R25.1) Tremors of nervous system  Comment: Patient has had tremendous improvement in her tremor since being placed on Topamax.  She will continue Topamax 25 mg daily for management of her ongoing tremor.  Side effects medication were reviewed.  Patient had no further questions or concerns in this regard.  Plan: OFFICE/OUTPT VISIT,EST,LEVL IV    (E78.5) Hyperlipidemia, unspecified hyperlipidemia type  Comment: Patient will return at a later time for a fasting blood draw.  A lipid panel will be collected.  Patient is not currently taking any type of statin despite having hyperlipidemia listed on her medication list.  She has had issues tolerating statins in the past.  We did briefly discuss dietary lifestyle medication that can keep her cholesterol under good control.  Patient will be contacted with results of her labs once they are available for review.  Plan: Lipid panel reflex to direct LDL Fasting,         OFFICE/OUTPT VISIT,EST,LEVL IV             Patient has been advised of split billing requirements and indicates understanding: Yes    COUNSELING:  Reviewed preventive health counseling, as reflected in patient instructions    Estimated body mass index is 36.21 kg/m  as calculated from the following:    Height as of 12/1/21: 1.575 m (5' 2\").    Weight as of 12/1/21: 89.8 kg (198 lb).    Weight management plan: Discussed healthy diet and exercise guidelines    She reports that she has never smoked. She has never used smokeless tobacco.      Appropriate preventive services were discussed with this patient, including applicable " screening as appropriate for cardiovascular disease, diabetes, osteopenia/osteoporosis, and glaucoma.  As appropriate for age/gender, discussed screening for colorectal cancer, prostate cancer, breast cancer, and cervical cancer. Checklist reviewing preventive services available has been given to the patient.    Reviewed patients plan of care and provided an AVS. The Basic Care Plan (routine screening as documented in Health Maintenance) for Lisa meets the Care Plan requirement. This Care Plan has been established and reviewed with the Patient.    Counseling Resources:  ATP IV Guidelines  Pooled Cohorts Equation Calculator  Breast Cancer Risk Calculator  Breast Cancer: Medication to Reduce Risk  FRAX Risk Assessment  ICSI Preventive Guidelines  Dietary Guidelines for Americans, 2010  [employer]'s MyPlate  ASA Prophylaxis  Lung CA Screening    Jc Oneil MD  [facility]    Identified Health Risks:  Answers for HPI/ROS submitted by the patient on 4/13/2022  If you checked off any problems, how difficult have these problems made it for you to do your work, take care of things at home, or get along with other people?: Not difficult at all  PHQ9 TOTAL SCORE: 0

## 2022-04-14 DIAGNOSIS — E78.5 HYPERLIPIDEMIA, UNSPECIFIED HYPERLIPIDEMIA TYPE: ICD-10-CM

## 2022-04-14 DIAGNOSIS — I10 HYPERTENSION, UNSPECIFIED TYPE: ICD-10-CM

## 2022-04-14 DIAGNOSIS — N18.31 STAGE 3A CHRONIC KIDNEY DISEASE (H): ICD-10-CM

## 2022-04-14 PROCEDURE — 36415 COLL VENOUS BLD VENIPUNCTURE: CPT

## 2022-04-14 PROCEDURE — 80053 COMPREHEN METABOLIC PANEL: CPT

## 2022-04-14 PROCEDURE — 80061 LIPID PANEL: CPT

## 2022-04-14 PROCEDURE — 83721 ASSAY OF BLOOD LIPOPROTEIN: CPT | Mod: 59

## 2022-04-14 ASSESSMENT — PATIENT HEALTH QUESTIONNAIRE - PHQ9: SUM OF ALL RESPONSES TO PHQ QUESTIONS 1-9: 0

## 2022-04-15 LAB
ALBUMIN SERPL-MCNC: 4.2 G/DL (ref 3.4–5)
ALP SERPL-CCNC: 79 U/L (ref 40–150)
ALT SERPL W P-5'-P-CCNC: 24 U/L (ref 0–50)
ANION GAP SERPL CALCULATED.3IONS-SCNC: 9 MMOL/L (ref 3–14)
AST SERPL W P-5'-P-CCNC: 17 U/L (ref 0–45)
BILIRUB SERPL-MCNC: 0.4 MG/DL (ref 0.2–1.3)
BUN SERPL-MCNC: 22 MG/DL (ref 7–30)
CALCIUM SERPL-MCNC: 10.2 MG/DL (ref 8.5–10.1)
CHLORIDE BLD-SCNC: 106 MMOL/L (ref 94–109)
CHOLEST SERPL-MCNC: 272 MG/DL
CO2 SERPL-SCNC: 24 MMOL/L (ref 20–32)
CREAT SERPL-MCNC: 1 MG/DL (ref 0.52–1.04)
FASTING STATUS PATIENT QL REPORTED: YES
GFR SERPL CREATININE-BSD FRML MDRD: 56 ML/MIN/1.73M2
GLUCOSE BLD-MCNC: 111 MG/DL (ref 70–99)
HDLC SERPL-MCNC: 39 MG/DL
LDLC SERPL CALC-MCNC: 167 MG/DL
LDLC SERPL CALC-MCNC: ABNORMAL MG/DL
NONHDLC SERPL-MCNC: 233 MG/DL
POTASSIUM BLD-SCNC: 4.1 MMOL/L (ref 3.4–5.3)
PROT SERPL-MCNC: 7.8 G/DL (ref 6.8–8.8)
SODIUM SERPL-SCNC: 139 MMOL/L (ref 133–144)
TRIGL SERPL-MCNC: 428 MG/DL

## 2022-04-18 DIAGNOSIS — E78.5 HYPERLIPIDEMIA, UNSPECIFIED HYPERLIPIDEMIA TYPE: ICD-10-CM

## 2022-04-18 RX ORDER — GEMFIBROZIL 600 MG/1
600 TABLET, FILM COATED ORAL
Qty: 60 TABLET | Refills: 5 | Status: SHIPPED | OUTPATIENT
Start: 2022-04-18 | End: 2022-04-20

## 2022-04-19 NOTE — TELEPHONE ENCOUNTER
Pending Prescriptions:                       Disp   Refills    gemfibrozil (LOPID) 600 MG tablet [Pharmac*180 ta*         Sig: TAKE 1 TABLET(600 MG) BY MOUTH TWICE DAILY BEFORE           MEALS    Routing refill request to provider for review/approval because:  Patient requesting 90 day supply.       Cindy NOVAK RN   [facility]

## 2022-04-20 RX ORDER — GEMFIBROZIL 600 MG/1
TABLET, FILM COATED ORAL
Qty: 180 TABLET | Refills: 1 | Status: SHIPPED | OUTPATIENT
Start: 2022-04-20

## 2022-04-29 DIAGNOSIS — I10 BENIGN ESSENTIAL HYPERTENSION: ICD-10-CM

## 2022-04-29 RX ORDER — SPIRONOLACTONE 50 MG/1
50 TABLET, FILM COATED ORAL DAILY
Qty: 90 TABLET | Refills: 0 | Status: SHIPPED | OUTPATIENT
Start: 2022-04-29 | End: 2022-08-03

## 2022-04-29 RX ORDER — METOPROLOL SUCCINATE 25 MG/1
25 TABLET, EXTENDED RELEASE ORAL DAILY
Qty: 90 TABLET | Refills: 0 | Status: SHIPPED | OUTPATIENT
Start: 2022-04-29

## 2022-04-29 RX ORDER — ALLOPURINOL 100 MG/1
100 TABLET ORAL DAILY
Qty: 90 TABLET | Refills: 0 | Status: SHIPPED | OUTPATIENT
Start: 2022-04-29 | End: 2022-05-16

## 2022-04-29 RX ORDER — GABAPENTIN 300 MG/1
300 CAPSULE ORAL 3 TIMES DAILY
COMMUNITY

## 2022-04-29 NOTE — TELEPHONE ENCOUNTER
Allopurinol not on medication list, called patient to confirm dosing of medications. Per patient dosing is as follows:  Allopurinol 100 mg daily   Metoprolol Succinate 25 mg daily  Spironolactone 50 mg daily   Routing refill request to provider for review/approval because:  Transferring prescriptions to new provider.      She also asks to have Gabapentin 300 mg TID added to her medication list - previous provider gave her several months of refills, but wants this on file for future refills. Gabapentin 300 mg TID added to medication list as patient reported medication.    Ainsley Martinez RN  [facility]

## 2022-04-29 NOTE — TELEPHONE ENCOUNTER
Patient requesting refills of allopurinol, spironolactone (almost out of), & metoprolol. Needs soon.    Please call when done or if you have any questions.  OK to LM on VM    Patient uses [facility] in [place] (listed in her chart)

## 2022-05-06 DIAGNOSIS — I10 BENIGN ESSENTIAL HYPERTENSION: ICD-10-CM

## 2022-05-09 RX ORDER — METOPROLOL SUCCINATE 25 MG/1
TABLET, EXTENDED RELEASE ORAL
Qty: 90 TABLET | Refills: 0 | OUTPATIENT
Start: 2022-05-09

## 2022-05-09 NOTE — TELEPHONE ENCOUNTER
Refill too soon  E-Prescribing Status: Receipt confirmed by pharmacy (4/29/2022  2:19 PM CDT)  Ricardo NOVAK RN, BSN

## 2022-05-13 DIAGNOSIS — I10 BENIGN ESSENTIAL HYPERTENSION: ICD-10-CM

## 2022-05-16 RX ORDER — ALLOPURINOL 100 MG/1
TABLET ORAL
Qty: 90 TABLET | Refills: 0 | Status: SHIPPED | OUTPATIENT
Start: 2022-05-16

## 2022-05-16 NOTE — TELEPHONE ENCOUNTER
Routing refill request to provider for review/approval because:  Gout Agents Protocol Failed 05/13/2022 08:03 AM   Protocol Details  Has Uric Acid on file in past 12 months and value is less than 6     Brandi Coello RN, BSN  [facility]

## 2022-06-23 ENCOUNTER — HOSPITAL ENCOUNTER (EMERGENCY)
Facility: CLINIC | Age: 82
Discharge: HOME | End: 2022-06-23
Attending: PHYSICIAN ASSISTANT | Admitting: PHYSICIAN ASSISTANT
Payer: MEDICARE

## 2022-06-23 VITALS
TEMPERATURE: 98.2 F | HEART RATE: 86 BPM | DIASTOLIC BLOOD PRESSURE: 56 MMHG | RESPIRATION RATE: 18 BRPM | OXYGEN SATURATION: 94 % | SYSTOLIC BLOOD PRESSURE: 98 MMHG

## 2022-06-23 DIAGNOSIS — R04.0 EPISTAXIS: ICD-10-CM

## 2022-06-23 LAB
ALBUMIN SERPL-MCNC: 3.5 G/DL (ref 3.4–5)
ALP SERPL-CCNC: 61 U/L (ref 40–150)
ALT SERPL W P-5'-P-CCNC: 25 U/L (ref 0–50)
ANION GAP SERPL CALCULATED.3IONS-SCNC: 5 MMOL/L (ref 3–14)
AST SERPL W P-5'-P-CCNC: 13 U/L (ref 0–45)
BILIRUB SERPL-MCNC: 0.2 MG/DL (ref 0.2–1.3)
BUN SERPL-MCNC: 30 MG/DL (ref 7–30)
CALCIUM SERPL-MCNC: 9.4 MG/DL (ref 8.5–10.1)
CHLORIDE BLD-SCNC: 110 MMOL/L (ref 94–109)
CO2 SERPL-SCNC: 22 MMOL/L (ref 20–32)
CREAT SERPL-MCNC: 1.24 MG/DL (ref 0.52–1.04)
ERYTHROCYTE [DISTWIDTH] IN BLOOD BY AUTOMATED COUNT: 13.5 % (ref 10–15)
GFR SERPL CREATININE-BSD FRML MDRD: 43 ML/MIN/1.73M2
GLUCOSE BLD-MCNC: 109 MG/DL (ref 70–99)
HCT VFR BLD AUTO: 33.5 % (ref 35–47)
HGB BLD-MCNC: 11 G/DL (ref 11.7–15.7)
MCH RBC QN AUTO: 32.5 PG (ref 26.5–33)
MCHC RBC AUTO-ENTMCNC: 32.8 G/DL (ref 31.5–36.5)
MCV RBC AUTO: 99 FL (ref 78–100)
PLATELET # BLD AUTO: 268 10E3/UL (ref 150–450)
POTASSIUM BLD-SCNC: 3.6 MMOL/L (ref 3.4–5.3)
PROT SERPL-MCNC: 6.8 G/DL (ref 6.8–8.8)
RBC # BLD AUTO: 3.38 10E6/UL (ref 3.8–5.2)
SODIUM SERPL-SCNC: 137 MMOL/L (ref 133–144)
WBC # BLD AUTO: 17 10E3/UL (ref 4–11)

## 2022-06-23 PROCEDURE — 30901 CONTROL OF NOSEBLEED: CPT

## 2022-06-23 PROCEDURE — 99284 EMERGENCY DEPT VISIT MOD MDM: CPT | Mod: 25

## 2022-06-23 PROCEDURE — 80053 COMPREHEN METABOLIC PANEL: CPT | Performed by: PHYSICIAN ASSISTANT

## 2022-06-23 PROCEDURE — 36415 COLL VENOUS BLD VENIPUNCTURE: CPT | Performed by: PHYSICIAN ASSISTANT

## 2022-06-23 PROCEDURE — 85027 COMPLETE CBC AUTOMATED: CPT | Performed by: PHYSICIAN ASSISTANT

## 2022-06-23 RX ORDER — OXYMETAZOLINE HYDROCHLORIDE 0.05 G/100ML
2 SPRAY NASAL ONCE
Status: DISCONTINUED | OUTPATIENT
Start: 2022-06-23 | End: 2022-06-24 | Stop reason: HOSPADM

## 2022-06-23 ASSESSMENT — ENCOUNTER SYMPTOMS
FEVER: 0
LIGHT-HEADEDNESS: 1

## 2022-06-24 ASSESSMENT — ENCOUNTER SYMPTOMS
NAUSEA: 0
VOMITING: 0

## 2022-06-24 NOTE — ED PROVIDER NOTES
History   Chief Complaint:  Epistaxis (/)       The history is provided by the patient.      Lisa Johnston is a 81 year old female with history of hypertension, hyperlipidemia, CKD, GERD, ASCVD, and statin myopathy who presents with epistaxis to her left nostril since 1700, approximately 4 hours ago, which resolved temporarily for 30 min until it reoccurred and has not resolved.  She reported tilting her head back and applying pressure to the sides of her nose in order to alleviate symptoms, which was effective with the initial epistaxis but not with the reoccurance.  Additionally, she has been feeling intermittent weakness and light-headedness since yesterday, which reoccurred just upon arrival at the ED.  Here in the ED, she notes history of epistaxis to the right nostril 3 years ago, which she was seen for by an ENT.  They found a growth and cauterized it, resolving all symptoms.  She suspects today's case is similar to that of 3 years ago.  She denies fever and currently being anticoagulated by blood thinners.    Review of Systems   Constitutional: Negative for fever.   HENT: Positive for nosebleeds (left).    Gastrointestinal: Negative for nausea and vomiting.   Neurological: Positive for light-headedness.   All other systems reviewed and are negative.      Allergies:  Penicillins  Adhesive Tape  Gluten Meal  Hmg-Coa-R Inhibitors  Kdc:Egg Phospholipids+Sodium Metabisulfite+Soybean Oil+Propofol  Lactase  Levaquin  Levofloxacin   Molds & Smuts  Pollen Extract  Sulfa Drugs  Latex    Medications:  Allopurinol  Lipoic acid  Aspirin  Coenzyme Q10  Folic acid  Gabapentin  Gemfibrozil   Metoprolol succinate  Senna-docusate   Spironolactone  Topiramate  Trimethoprim     Past Medical History:     Spinal stenosis  Adnexal mass  Tremors  Statin myopathy  Skipped heartbeats  CKD  Obesity  Insomnia  Hypertension  Hyperlipidemia   GERD  ESBL  Chronic pain disorder  Cysts of both ovaries  Cervical spondylosis  ASCVD  Sleep  apnea     Past Surgical History:    S/P reverse total shoulder arthroplasty, right x2 left x1  Reverse arthroplasty, right  Shoulder arthroscopy  Appendectomy  Colonoscopy  Cryotherapy  Cystoscopy  Decompression, fusion lumbar posterior  Vaginal repair  Graft bone from iliac crest  Laparoscopic salpingo-oophorectomy  Bunionectomy   Foot pinning, bilateral  Bladder repair  Vitrectomy parsplana  Pilonidal cyst excision.     Family History:    Cerebrovascular disease  Bladder cancer  CAD  Colon cancer  Breast cancer  Uterine cancer  Breast cancer    Social History:  The patient presents alone.  The patient presents in a private vehicle.  PCP: none listed    Physical Exam     Patient Vitals for the past 24 hrs:   BP Temp Temp src Pulse Resp SpO2   06/23/22 2023 98/56 98.2  F (36.8  C) Oral 86 18 94 %       Physical Exam    General:Vitals reviewed  Heart: Regular rate and rhythm without murmurs, rubs, gallops  Lungs: Bilateral breath sounds, Clear to auscultation, no wheezing, rhonchi, Rales, crackles.  Normal respiratory excursion.  Skin: No pallor  Neuro: Normal Mentation  Eyes: Nonicteric, noninjected, normal range of motion, PERRLA  Nose: Noted active bleeding from the left nare.  No clot noted.  Once bleeding has slowed down evidence of anterior source identified.  Ears: Bilateral tympanic membranes are pearly gray without erythema, or bulging.  Canals are free of discharge.  TMs are intact.  Oropharynx: No erythema of the back of the throat, uvula midline, moist mucous membranes, no tonsillitis, no trismus.    Emergency Department Course     Procedures    Procedure:  Epistaxis  management    Performed by Tommie Pat PA-C.    Bleeding noted in left side nare  Preparation:    Treatment:   Direct compression:Yes  Cautery:Yes  Topical Afrin:Yes    Packing:    Merocel Yes  Rapid RhinoNo  Packing removed prior to discharge and silver nitrate applied.    Reassessment:  Bleeding controlled: Yes    Patient was  discharged in stable condition    Emergency Department Course:    Reviewed:  I reviewed nursing notes, vitals, past medical history and Care Everywhere    Assessments:  2116 I obtained history and examined the patient as noted above.   2129 I rechecked the patient.   2157 I rechecked the patient. I believe that they are safe for discharge at this time.    Interventions:  Oxymetazoline 2 spray nasal  Lidocaine with epi    Disposition:  The patient was discharged to home.     Impression & Plan     Medical Decision Making:  This is an 81-year-old female who presents to the emergency department for concerns of ongoing epitaxis from her left nare that started today.  After careful review of her history of present illness, physical exam findings, vital signs, laboratory testing today my diagnosis impression is below.  I was able to stop her bleeding with direct pressure application of Afrin and lidocaine with epinephrine and nasal packing.  The packing was removed prior to discharge and the source was identified and cauterized with silver nitrate.  Patient's symptoms of lightheadedness had resolved prior to my evaluation.  Do note that she has a slight drop in her hemoglobin but not significant requiring any blood products and I do not feel the patient requires admission.  Do note elevated white blood cell count however the patient is not having constitutional symptoms of fever or other signs of infection at this time.  Is noted that she has had elevated white counts in the past.  This could be secondary to stress  desquamatization from the nasal bleeding.  I recommend close follow-up with her ear nose throat doctor and if the bleeding reoccurs return back to the emergency department.  Follow-up with primary care regarding leukocytosis.  Her creatinine is mildly elevated today however she has had elevations in the past and I think this is consistent with her chronic kidney disease.    Diagnosis:    ICD-10-CM    1.  Epistaxis  R04.0        Discharge Medications:  Discharge Medication List as of 6/23/2022 10:07 PM          Scribe Disclosure:  I, Barb Hull, am serving as a scribe at 9:18 PM on 6/23/2022 to document services personally performed by Tommie Pat, CIS[facility] based on my observations and the provider's statements to me.            Tommie Pat PA-C  06/24/22 0908

## 2022-06-24 NOTE — ED TRIAGE NOTES
"Pt states began having left sided epistaxis tonight around 1730. Pt denies blood thinner use. Pt states had to have right nare cauterized by ENT \"about 3 years ago\" for repeated epistaxis. ABCs intact GCS 15     Triage Assessment     Row Name 06/23/22 2024       Triage Assessment (Adult)    Airway WDL WDL       Respiratory WDL    Respiratory WDL WDL       Skin Circulation/Temperature WDL    Skin Circulation/Temperature WDL WDL       Cardiac WDL    Cardiac WDL WDL       Peripheral/Neurovascular WDL    Peripheral Neurovascular WDL WDL       Brandon Coma Scale    Best Eye Response 4-->(E4) spontaneous    Best Motor Response 6-->(M6) obeys commands    Best Verbal Response 5-->(V5) oriented    Aj Coma Scale Score 15              "

## 2022-07-31 DIAGNOSIS — I10 BENIGN ESSENTIAL HYPERTENSION: ICD-10-CM

## 2022-08-01 NOTE — TELEPHONE ENCOUNTER
Pending Prescriptions:                       Disp   Refills    spironolactone (ALDACTONE) 50 MG tablet [P*90 tab*0        Sig: TAKE 1 TABLET(50 MG) BY MOUTH DAILY    Routing refill request to provider for review/approval because:  Needs provider review

## 2022-08-03 RX ORDER — SPIRONOLACTONE 50 MG/1
TABLET, FILM COATED ORAL
Qty: 90 TABLET | Refills: 0 | Status: SHIPPED | OUTPATIENT
Start: 2022-08-03

## 2022-08-12 DIAGNOSIS — I10 BENIGN ESSENTIAL HYPERTENSION: ICD-10-CM

## 2022-08-12 DIAGNOSIS — E78.5 HYPERLIPIDEMIA, UNSPECIFIED HYPERLIPIDEMIA TYPE: ICD-10-CM

## 2022-08-15 RX ORDER — SPIRONOLACTONE 50 MG/1
TABLET, FILM COATED ORAL
Qty: 90 TABLET | Refills: 0 | OUTPATIENT
Start: 2022-08-15

## 2022-08-15 RX ORDER — GEMFIBROZIL 600 MG/1
TABLET, FILM COATED ORAL
Qty: 180 TABLET | Refills: 1 | OUTPATIENT
Start: 2022-08-15

## 2022-08-15 NOTE — TELEPHONE ENCOUNTER
Refill requested too soon.    90 day supply of Spironolactone 50 mg sent 8/3/22    90 day with 1 refill of Gemfibrozil 600 mg sent 4/20/22.

## 2022-09-20 ENCOUNTER — HOSPITAL ENCOUNTER (EMERGENCY)
Facility: CLINIC | Age: 82
Discharge: HOME | End: 2022-09-20
Attending: EMERGENCY MEDICINE | Admitting: EMERGENCY MEDICINE
Payer: MEDICARE

## 2022-09-20 ENCOUNTER — APPOINTMENT (OUTPATIENT)
Dept: GENERAL RADIOLOGY | Facility: CLINIC | Age: 82
End: 2022-09-20
Attending: EMERGENCY MEDICINE
Payer: MEDICARE

## 2022-09-20 VITALS
OXYGEN SATURATION: 99 % | HEART RATE: 84 BPM | TEMPERATURE: 97 F | RESPIRATION RATE: 17 BRPM | DIASTOLIC BLOOD PRESSURE: 56 MMHG | SYSTOLIC BLOOD PRESSURE: 151 MMHG

## 2022-09-20 DIAGNOSIS — J45.901 EXACERBATION OF ASTHMA, UNSPECIFIED ASTHMA SEVERITY, UNSPECIFIED WHETHER PERSISTENT: ICD-10-CM

## 2022-09-20 PROCEDURE — 250N000012 HC RX MED GY IP 250 OP 636 PS 637: Performed by: EMERGENCY MEDICINE

## 2022-09-20 PROCEDURE — 99284 EMERGENCY DEPT VISIT MOD MDM: CPT | Mod: 25

## 2022-09-20 PROCEDURE — 93005 ELECTROCARDIOGRAM TRACING: CPT

## 2022-09-20 PROCEDURE — 94640 AIRWAY INHALATION TREATMENT: CPT

## 2022-09-20 PROCEDURE — 71046 X-RAY EXAM CHEST 2 VIEWS: CPT

## 2022-09-20 PROCEDURE — 250N000009 HC RX 250: Performed by: EMERGENCY MEDICINE

## 2022-09-20 RX ORDER — IPRATROPIUM BROMIDE AND ALBUTEROL SULFATE 2.5; .5 MG/3ML; MG/3ML
6 SOLUTION RESPIRATORY (INHALATION) ONCE
Status: DISCONTINUED | OUTPATIENT
Start: 2022-09-20 | End: 2022-09-20

## 2022-09-20 RX ORDER — IPRATROPIUM BROMIDE AND ALBUTEROL SULFATE 2.5; .5 MG/3ML; MG/3ML
3 SOLUTION RESPIRATORY (INHALATION) ONCE
Status: COMPLETED | OUTPATIENT
Start: 2022-09-20 | End: 2022-09-20

## 2022-09-20 RX ORDER — PREDNISONE 20 MG/1
TABLET ORAL
Qty: 10 TABLET | Refills: 0 | Status: SHIPPED | OUTPATIENT
Start: 2022-09-21

## 2022-09-20 RX ORDER — PREDNISONE 20 MG/1
20 TABLET ORAL ONCE
Status: COMPLETED | OUTPATIENT
Start: 2022-09-20 | End: 2022-09-20

## 2022-09-20 RX ORDER — PREDNISONE 20 MG/1
60 TABLET ORAL ONCE
Status: DISCONTINUED | OUTPATIENT
Start: 2022-09-20 | End: 2022-09-20

## 2022-09-20 RX ORDER — ALBUTEROL SULFATE 90 UG/1
2 AEROSOL, METERED RESPIRATORY (INHALATION) EVERY 6 HOURS PRN
Qty: 18 G | Refills: 0 | Status: SHIPPED | OUTPATIENT
Start: 2022-09-20

## 2022-09-20 RX ORDER — PREDNISONE 20 MG/1
40 TABLET ORAL ONCE
Status: COMPLETED | OUTPATIENT
Start: 2022-09-20 | End: 2022-09-20

## 2022-09-20 RX ADMIN — PREDNISONE 40 MG: 20 TABLET ORAL at 14:38

## 2022-09-20 RX ADMIN — IPRATROPIUM BROMIDE AND ALBUTEROL SULFATE 3 ML: .5; 3 SOLUTION RESPIRATORY (INHALATION) at 16:26

## 2022-09-20 RX ADMIN — IPRATROPIUM BROMIDE AND ALBUTEROL SULFATE 3 ML: .5; 3 SOLUTION RESPIRATORY (INHALATION) at 15:38

## 2022-09-20 RX ADMIN — PREDNISONE 20 MG: 20 TABLET ORAL at 16:26

## 2022-09-20 ASSESSMENT — ENCOUNTER SYMPTOMS
HEMATURIA: 0
DIFFICULTY URINATING: 0
FATIGUE: 1
SHORTNESS OF BREATH: 1
FREQUENCY: 0
CHEST TIGHTNESS: 1
COUGH: 1
SORE THROAT: 1
WHEEZING: 1
DYSURIA: 0
FEVER: 0

## 2022-09-20 ASSESSMENT — ACTIVITIES OF DAILY LIVING (ADL): ADLS_ACUITY_SCORE: 35

## 2022-09-20 NOTE — ED TRIAGE NOTES
"Cough and cold symptoms since last week, seen in ED when mucus was green and discharged home. Now \"it's gone down into my lung, I have asthma, my ashtma's flared up, my throat is terribly sore and I'm still brining up that crud that's greed and I'm having to use my albuterol so frequently so I can't breathe.\"  96% on RA. Pt reports currently on abx UTI. VSS. A&O x 4.       "

## 2022-09-20 NOTE — ED PROVIDER NOTES
History   Chief Complaint:  Cough and Shortness of Breath       HPI   Lisa Johnston is a 82 year old female with history of HTN, hyperlipidemia, ASCVD, CKD, and asthma who a presents with a productive cough, wheezing, and shortness of breath. Patient says developed cold symptoms last week and was seen in the ED. She had an unremarkable workup at this time and was discharged home. Since this time, patient's symptoms have worsened and she has had to use her Albuterol Neb at home frequently to be able to breath. Patient attributes this to an asthma flare up. She also endorses coughing up yellow/green phlegm, a sore throat, fatigue, and chest tightness. No leg swelling, fevers, or chest pain. Patient has never had to be hospitalized for an asthma flare up, but notes she has required steroids before. She was diagnosed with a UTI three days ago and is currently on a course of Cefdinir. Patient also had positive pan-sensitive E. Coli result from her recent urine culture. Her urinary symptoms have since resolved.     Review of Systems   Constitutional: Positive for fatigue. Negative for fever.   HENT: Positive for sore throat.    Respiratory: Positive for cough, chest tightness, shortness of breath and wheezing.    Cardiovascular: Negative for chest pain and leg swelling.   Genitourinary: Negative for decreased urine volume, difficulty urinating, dysuria, frequency, hematuria and urgency.   All other systems reviewed and are negative.    Allergies:  Penicillins  Adhesive Tape  Gluten Meal  Hmg-Coa-R Inhibitors  Kdc:Egg Phospholipids+Sodium Metabisulfite+Soybean Oil+Propofol  Lactase  Levaquin [Levofloxacin]  Molds & Smuts  Pollen Extract  Sulfa Drugs  Latex    Medications:  Zyloprim  Aspirin 325 mg   Zyrtec  Neurontin  Lopid  Toprol  Senokot  Aldactone  Topamax  Primsol  Omnicef    Past Medical History:     Spinal stenosis  Adnexal mass  Tremors  Statin myopathy  CKD, stage 3a  Recurrent  UTI  Obesity  Insomnia  HTN  Hyperlipidemia  GERD  ESBL producing bacteria infection  Chronic pain disorder  Cervical spondylosis  ASCVD  Asthma   Sleep apnea  Degenerative arthritis of thumb   Sciatica     Past Surgical History:    Appendectomy  Arthroscopy shoulder, right x2, left x1  Reverse arthroplasty shoulder, right  Colonoscopy  Cryotherapy  Decompression fusion lumbar posterior one level, combined  Vaginal repair  Graft bone from iliac crest  TATIANNA & BSO  Bunionectomy, left  Bilateral foot pinning  Vitrectomy  Repair bladder   Cataracts   Tonsillectomy & adenoidectomy   Pilonidal cyst excision     Family History:    Mother: Cerebrovascular disease, stroke, hyperlipidemia   Father: Bladder cancer, stroke  Sister: CAD, CHF, emphysema, drug/alcohol addiction  Brother: Colon cancer, drug/alcohol addiction   Daughter: Breast cancer    Social History:  The patient presents to the ED with her    Arrives via private vehicle    Physical Exam     Patient Vitals for the past 24 hrs:   BP Temp Temp src Pulse Resp SpO2   09/20/22 1550 (!) 159/87 -- -- 86 19 99 %   09/20/22 1349 (!) 170/84 -- -- 77 18 98 %   09/20/22 1206 (!) 140/77 97  F (36.1  C) Temporal 89 19 96 %     Physical Exam  Vitals reviewed.   HENT:      Head: Normocephalic.      Mouth/Throat:      Mouth: Mucous membranes are moist.   Cardiovascular:      Rate and Rhythm: Normal rate and regular rhythm.   Pulmonary:      Effort: Pulmonary effort is normal.      Breath sounds: Wheezing present.   Musculoskeletal:         General: Normal range of motion.   Skin:     General: Skin is warm.      Capillary Refill: Capillary refill takes less than 2 seconds.   Neurological:      General: No focal deficit present.      Mental Status: She is alert and oriented to person, place, and time.   Psychiatric:         Mood and Affect: Mood normal.           Emergency Department Course   ECG  ECG taken at 1549, ECG read at 1646  Normal sinus rhythm  Normal ECG   No  significant change as compared to prior, dated 11/09/2021.  Rate 95 bpm. AR interval 158 ms. QRS duration 82 ms. QT/QTc 358/449 ms. P-R-T axes 60 5 7.     Imaging:  Chest XR,  PA & LAT   Final Result   IMPRESSION: No acute cardiopulmonary disease. Spinal stimulator leads   in right reverse total shoulder arthroplasty noted.      ROSA LANDAVERDE MD            SYSTEM ID:  [id number]        Report per radiology    Laboratory:  Labs Ordered and Resulted from Time of ED Arrival to Time of ED Departure - No data to display     Emergency Department Course:   Reviewed:  I reviewed nursing notes, vitals, past medical history and Care Everywhere    Assessments:  1523 I obtained history and examined the patient as noted above.   1638 I rechecked the patient and explained findings.     Interventions:  1438 Deltasone 40 mg  PO   1538 Duoneb  3 mL  NEB  1626 Duoneb   3 mL  NEB  1626 Deltasone  20 mg  PO    Disposition:  The patient was discharged to home.     Impression & Plan   Medical Decision Making:  Patient presents with shortness of breath and audible wheezing.  Patient seemed improved with above interventions.  Full sentences without signs of hypoxemia or respiratory distress.  Patient did describe a tightness troponin and EKG were normal.  Did consider admission but due to clear resolution in symptoms with above intervention recommend discharge home with similar and follow-up with primary care.  Patient was discharged in stable condition.    Covid-19  Lisa Johnston was evaluated during a global COVID-19 pandemic, which necessitated consideration that the patient might be at risk for infection with the SARS-CoV-2 virus that causes COVID-19.   Applicable protocols for evaluation were followed during the patient's care.   COVID-19 was considered as part of the patient's evaluation. The plan for testing is:  a test was obtained during this visit.    COVID-19 Virus (Coronavirus) by PCR Nasopharyngeal Swab:  pending      Diagnosis:    ICD-10-CM    1. Exacerbation of asthma, unspecified asthma severity, unspecified whether persistent  J45.901        Discharge Medications:  New Prescriptions    ALBUTEROL (PROAIR HFA/PROVENTIL HFA/VENTOLIN HFA) 108 (90 BASE) MCG/ACT INHALER    Inhale 2 puffs into the lungs every 6 hours as needed for shortness of breath / dyspnea or wheezing    PREDNISONE (DELTASONE) 20 MG TABLET    Take two tablets (= 40mg) each day for 5 (five) days       Scribe Disclosure:  I, Madison Rees, am serving as a scribe at 3:21 PM on 9/20/2022 to document services personally performed by Christopher Kelly MD based on my observations and the provider's statements to me.            Aristides Hensley MD  09/23/22 0052

## 2022-09-20 NOTE — ED NOTES
Pt back for intake process, ABC's intact. VSS. Denies pain. Pt able to ambulate to room on own.   
Rapid Assessment Note    History:   Lisa Johnston is a 82 year old female with history of stage III CKD and hypertension who presents with cough and shortness of breath. The patient explains that she has been coughing up green colored mucus for the last few days. She complains of wheezing and general malaise. She notes that she was seen at urgent care on 9/17/22 for a UTI and was given antibiotics. At that time she was experiencing burning with urination and urinary frequency. She reports that she tested negative at home for Covid-19 on 9/15/22. She denies fever.    Exam:   Well-appearing sitting upright talking in full sentences    Card exam regular no murmurs rubs or gallops  Pulm exam wheezes bilaterally.  No accessory muscle use.  Lower extremity no edema no rash    Plan of Care:     Patient presents with ongoing urinary symptoms reached out to [facility] to see culture results there is a history of ESBL E. coli currently on cephalosporin.  Recommended a chest x-ray due to ongoing coughing DuoNeb and steroid due to wheezing believe lab work based on urine culture results if obtainable at [facility] requested the [facility] to retrieve these results.  I evaluated the patient and developed an initial plan of care. I discussed this plan and explained that I, or one of my partners, would be returning to complete the evaluation.     I, Macie Boland, am serving as a scribe to document services personally performed by Aristides Hensley MD , based on my observations and the provider's statements to me.    11/5/2018  [employer]    Portions of this medical record were completed by a scribe. UPON MY REVIEW AND AUTHENTICATION BY ELECTRONIC SIGNATURE, this confirms (a) I performed the applicable clinical services, and (b) the record is accurate.      Aristides Hensley MD  09/20/22 3714    
/mental health services

## 2022-09-21 ENCOUNTER — PATIENT OUTREACH (OUTPATIENT)
Dept: CARE COORDINATION | Facility: CLINIC | Age: 82
End: 2022-09-21

## 2022-09-21 LAB
ATRIAL RATE - MUSE: 95 BPM
DIASTOLIC BLOOD PRESSURE - MUSE: NORMAL MMHG
INTERPRETATION ECG - MUSE: NORMAL
P AXIS - MUSE: 60 DEGREES
PR INTERVAL - MUSE: 158 MS
QRS DURATION - MUSE: 82 MS
QT - MUSE: 358 MS
QTC - MUSE: 449 MS
R AXIS - MUSE: 5 DEGREES
SYSTOLIC BLOOD PRESSURE - MUSE: NORMAL MMHG
T AXIS - MUSE: 7 DEGREES
VENTRICULAR RATE- MUSE: 95 BPM

## 2022-09-21 NOTE — PROGRESS NOTES
Clinic Care Coordination Contact  [facility]: Post-Discharge Note  SITUATION                                                      Admission:    Admission Date: 09/20/22   Reason for Admission: Exacerbation of asthma, unspecified asthma severity, unspecified whether persistent  Discharge:   Discharge Date: 09/20/22  Discharge Diagnosis: Exacerbation of asthma, unspecified asthma severity, unspecified whether persistent    BACKGROUND                                                      Per hospital discharge summary and inpatient provider notes:  Emergency Department Course:   Reviewed:  I reviewed nursing notes, vitals, past medical history and Care Everywhere     Assessments:  1523    I obtained history and examined the patient as noted above.   1638    I rechecked the patient and explained findings.      Interventions:  1438    Deltasone 40 mg  PO   1538    Duoneb  3 mL  NEB  1626    Duoneb   3 mL  NEB  1626    Deltasone  20 mg  PO     Disposition:  The patient was discharged to home.     ASSESSMENT           Discharge Assessment  How are you doing now that you are home?: Pt reports she is feeling much better. No questions or concerns about discharge instructions.  How are your symptoms? (Red Flag symptoms escalate to triage hotline per guidelines): Improved  Do you feel your condition is stable enough to be safe at home until your provider visit?: Yes  Does the patient have their discharge instructions? : Yes  Does the patient have questions regarding their discharge instructions? : No  Were you started on any new medications or were there changes to any of your previous medications? : Yes  Does the patient have all of their medications?: Yes  Do you have questions regarding any of your medications? : No  Do you have all of your needed medical supplies or equipment (DME)?  (i.e. oxygen tank, CPAP, cane, etc.): Yes  Discharge follow-up appointment scheduled within 14 calendar days? : Yes              PLAN                                                        Outpatient Plan:  Schedule an appointment with Jc Oneil MD (Internal Medicine) in 3 days (9/23/2022)        No future appointments.      For any urgent concerns, please contact our 24 hour nurse triage line: [phone] ([phone])         KAREY Watson  [facility]  [facility]     *Connected Care Resource Team does NOT follow patient ongoing. Referrals are identified based on internal discharge reports and the outreach is to ensure patient has an understanding of their discharge instructions.

## 2024-01-22 ENCOUNTER — TRANSFERRED RECORDS (OUTPATIENT)
Dept: HEALTH INFORMATION MANAGEMENT | Facility: CLINIC | Age: 84
End: 2024-01-22
Payer: MEDICARE

## 2024-02-01 ENCOUNTER — MEDICAL CORRESPONDENCE (OUTPATIENT)
Dept: SCHEDULING | Facility: CLINIC | Age: 84
End: 2024-02-01
Payer: MEDICARE

## 2024-08-12 NOTE — DISCHARGE INSTRUCTIONS
Agree with your plan to follow-up with urology due to recurrent UTIs.  You were also found to have a cyst on your left ovary.  I recommend ultrasound to further evaluate this as an outpatient.  You were given a first dose of IV antibiotics to cover for your UTI and will be given oral antibiotics to go home with.  Please take antibiotics until completion.   Called and spoke to patient. Patient knows this, his question is that since he is starting that low iodine diet, is he supposed to stop taking his calcium and vitamin D for the RIGGINS?

## (undated) DEVICE — LINEN TOWEL PACK X5 5464

## (undated) DEVICE — ENDO SCOPE WARMER LF TM500

## (undated) DEVICE — GLOVE PROTEXIS POWDER FREE 8.0 ORTHOPEDIC 2D73ET80

## (undated) DEVICE — PREP CHLORAPREP 26ML TINTED ORANGE  260815

## (undated) DEVICE — ENDO TROCAR FIRST ENTRY KII FIOS Z-THRD 12X100MM CTF73

## (undated) DEVICE — DEVICE RETRIEVER HEWSON 71111579

## (undated) DEVICE — GLOVE PROTEXIS POWDER FREE 6.5 ORTHOPEDIC 2D73ET65

## (undated) DEVICE — SU VICRYL 1 CT 36" J959H

## (undated) DEVICE — NDL BLUNT 18GA 1" W/O FILTER 305181

## (undated) DEVICE — SUCTION MANIFOLD NEPTUNE 2 SYS 4 PORT 0702-020-000

## (undated) DEVICE — DRAPE CONVERTORS U-DRAPE 60X72" 8476

## (undated) DEVICE — GLOVE PROTEXIS BLUE W/NEU-THERA 7.0  2D73EB70

## (undated) DEVICE — Device

## (undated) DEVICE — IMM SHOULDER  ABDUCT ULTRASLING III MED 11-0449-3

## (undated) DEVICE — LINEN HALF SHEET 5512

## (undated) DEVICE — SU FIBERWIRE 2 38" T-8 NDL  AR-7206

## (undated) DEVICE — ESU GROUND PAD UNIVERSAL W/O CORD

## (undated) DEVICE — STPL SKIN 35W APPOSE 8886803712

## (undated) DEVICE — SU ETHIBOND 1 CT-1 30" X425H

## (undated) DEVICE — SOL NACL 0.9% IRRIG 1000ML BOTTLE 2F7124

## (undated) DEVICE — SU MONOCRYL 4-0 PS-2 27" UND Y426H

## (undated) DEVICE — SU VICRYL 4-0 PS-2 18" UND J496H

## (undated) DEVICE — ESU PENCIL W/HOLSTER E2350H

## (undated) DEVICE — TUBING IRR TUR Y TYPE 2C4041

## (undated) DEVICE — BLADE SAW SAGITTAL STRK 20.7X85X0.89MM 2108-109-000S13

## (undated) DEVICE — MANIFOLD NEPTUNE 4 PORT 700-20

## (undated) DEVICE — GLOVE PROTEXIS POWDER FREE SMT 6.5  2D72PT65X

## (undated) DEVICE — NDL INSUFFLATION 13GA 120MM C2201

## (undated) DEVICE — ESU ELEC BLADE 4" COATED

## (undated) DEVICE — SU VICRYL 0 UR-6 27" J603H

## (undated) DEVICE — SU VICRYL 2-0 CT-1 27" J339H

## (undated) DEVICE — CATH TRAY FOLEY SURESTEP 16FR WDRAIN BAG STLK LATEX A300316A

## (undated) DEVICE — SUCTION IRR STRYKERFLOW II W/TIP 250-070-520

## (undated) DEVICE — SOL NACL 0.9% INJ 1000ML BAG 2B1324X

## (undated) DEVICE — SOL WATER IRRIG 1000ML BOTTLE 2F7114

## (undated) DEVICE — SU NDL MAYO 1824-4

## (undated) DEVICE — SU ETHIBOND 2 V-37 4X30" MX69G

## (undated) DEVICE — DRSG AQUACEL AG 3.5X9.75" HYDROFIBER 412011

## (undated) DEVICE — BLADE CLIPPER SGL USE 9680

## (undated) DEVICE — SUCTION TIP YANKAUER W/O VENT K86

## (undated) DEVICE — GLOVE PROTEXIS BLUE W/NEU-THERA 6.5  2D73EB65

## (undated) DEVICE — HOOD FLYTE W/PEELAWAY 408-800-100

## (undated) DEVICE — DRAPE X-RAY TUBE 00-901169-01-OEC

## (undated) DEVICE — SET HANDPIECE INTERPULSE W/COAXIAL FAN SPRAY TIP 0210118000

## (undated) DEVICE — DRAPE IOBAN INCISE 23X17" 6650EZ

## (undated) DEVICE — SOL WATER IRRIG 3000ML BAG 2B7117

## (undated) DEVICE — ADH SKIN CLOSURE PREMIERPRO EXOFIN 1.0ML 3470

## (undated) DEVICE — SU VICRYL 2-0 CT-2 27" UND J269H

## (undated) DEVICE — DRAIN ROUND W/RESERV KIT JACKSON PRATT 10FR 400ML SU130-402D

## (undated) DEVICE — PACK LARGE SPINE SNE15LSFSE

## (undated) DEVICE — TUBING SUCTION SOFT 20'X3/16" 0036570

## (undated) DEVICE — ENDO TROCAR FIRST ENTRY KII FIOS Z-THRD 05X100MM CTF03

## (undated) DEVICE — SU WND CLOSURE VLOC 180 ABS 0 6" GS-21 VLOCL0306

## (undated) DEVICE — DRAPE SHEET REV FOLD 3/4 9349

## (undated) DEVICE — SU VICRYL 3-0 PS-1 18" UND J683

## (undated) DEVICE — GLOVE PROTEXIS BLUE W/NEU-THERA 7.5  2D73EB75

## (undated) DEVICE — SU VICRYL 0 CT-1 27" J340H

## (undated) DEVICE — SU VICRYL 1 MO-4 18" J702D

## (undated) DEVICE — ESU GROUND PAD ADULT W/CORD E7507

## (undated) DEVICE — STPL SKIN PROXIMATE 35 WIDE PMW35

## (undated) DEVICE — PACK SHOULDER RIDGES

## (undated) DEVICE — DRAPE LEGGINGS 8421

## (undated) DEVICE — GLOVE PROTEXIS POWDER FREE 8.5 ORTHOPEDIC 2D73ET85

## (undated) DEVICE — DRSG ABDOMINAL 07 1/2X8" 7197D

## (undated) DEVICE — ENDO POUCH UNIV RETRIEVAL SYSTEM INZII 10MM CD001

## (undated) DEVICE — SPONGE LAP 18X18" X8435

## (undated) DEVICE — NDL COUNTER 20CT 31142493

## (undated) DEVICE — BNDG ELASTIC 4"X5YDS UNSTERILE 6611-40

## (undated) DEVICE — DRSG KERLIX 4 1/2"X4YDS ROLL 6715

## (undated) DEVICE — GLOVE PROTEXIS BLUE W/NEU-THERA 8.5  2D73EB85

## (undated) DEVICE — DRAPE STERI TOWEL LG 1010

## (undated) DEVICE — KIT PATIENT POSITIONING PIGAZZI LATEX FREE 40580

## (undated) DEVICE — ESU ENDO SCISSORS 5MM CVD 5DCS

## (undated) DEVICE — BUR ROUND 5MM CARBIDE XLONG 5093-230

## (undated) DEVICE — DEVICE SUTURE GRASPER TROCAR CLOSURE 14GA PMITCSG

## (undated) DEVICE — DRSG STERI STRIP 1/2X4" R1547

## (undated) DEVICE — CUSHION INSERT LG PRONE VIEW JACKSON TABLE

## (undated) DEVICE — LINEN ORTHO ACL PACK 5447

## (undated) DEVICE — CATH TRAY FOLEY SURESTEP 16FR W/URINE MTR STATLK LF A303416A

## (undated) DEVICE — DRAPE SHOULDER PACK SPLITS 29365

## (undated) DEVICE — PAD POSITIONING KIT HIP DISP 5844-17

## (undated) DEVICE — PACK SET-UP STD 9102

## (undated) DEVICE — ESU ELEC BLADE 6" COATED E1450-6

## (undated) DEVICE — SPONGE SURGIFOAM 50

## (undated) DEVICE — SU VICRYL 1 CT-1 27" J341H

## (undated) DEVICE — BAG CLEAR TRASH 1.3M 39X33" P4040C

## (undated) DEVICE — WIPES FOLEY CARE SURESTEP PROVON DFC100

## (undated) DEVICE — SYR BULB IRRIG 50ML LATEX FREE 0035280

## (undated) DEVICE — BLADE KNIFE SURG 10 371110

## (undated) DEVICE — LINEN FULL SHEET 5511

## (undated) DEVICE — DRAPE LAP W/ARMBOARD 29410

## (undated) DEVICE — RX SURGIFLO HEMOSTATIC MATRIX 10ML 199102S

## (undated) DEVICE — ENDO TROCAR SLEEVE KII Z-THREADED 05X100MM CTS02

## (undated) DEVICE — PAD POSITIONING KIT CHEST SHEARGUARD DISP LF 5844-13

## (undated) DEVICE — SOL NACL 0.9% IRRIG 1000ML BOTTLE 07138-09

## (undated) RX ORDER — FENTANYL CITRATE 50 UG/ML
INJECTION, SOLUTION INTRAMUSCULAR; INTRAVENOUS
Status: DISPENSED
Start: 2018-12-05

## (undated) RX ORDER — PROPOFOL 10 MG/ML
INJECTION, EMULSION INTRAVENOUS
Status: DISPENSED
Start: 2018-12-05

## (undated) RX ORDER — FENTANYL CITRATE 50 UG/ML
INJECTION, SOLUTION INTRAMUSCULAR; INTRAVENOUS
Status: DISPENSED
Start: 2021-12-01

## (undated) RX ORDER — HYDROMORPHONE HYDROCHLORIDE 1 MG/ML
INJECTION, SOLUTION INTRAMUSCULAR; INTRAVENOUS; SUBCUTANEOUS
Status: DISPENSED
Start: 2018-05-30

## (undated) RX ORDER — GLYCOPYRROLATE 0.2 MG/ML
INJECTION INTRAMUSCULAR; INTRAVENOUS
Status: DISPENSED
Start: 2018-12-05

## (undated) RX ORDER — HEPARIN SODIUM 5000 [USP'U]/.5ML
INJECTION, SOLUTION INTRAVENOUS; SUBCUTANEOUS
Status: DISPENSED
Start: 2021-12-01

## (undated) RX ORDER — DEXAMETHASONE SODIUM PHOSPHATE 4 MG/ML
INJECTION, SOLUTION INTRA-ARTICULAR; INTRALESIONAL; INTRAMUSCULAR; INTRAVENOUS; SOFT TISSUE
Status: DISPENSED
Start: 2021-12-01

## (undated) RX ORDER — HYDROMORPHONE HYDROCHLORIDE 1 MG/ML
INJECTION, SOLUTION INTRAMUSCULAR; INTRAVENOUS; SUBCUTANEOUS
Status: DISPENSED
Start: 2018-12-05

## (undated) RX ORDER — ONDANSETRON 2 MG/ML
INJECTION INTRAMUSCULAR; INTRAVENOUS
Status: DISPENSED
Start: 2018-12-05

## (undated) RX ORDER — EPINEPHRINE 1 MG/ML(1)
AMPUL (ML) INJECTION
Status: DISPENSED
Start: 2018-12-05

## (undated) RX ORDER — LIDOCAINE HYDROCHLORIDE AND EPINEPHRINE 10; 10 MG/ML; UG/ML
INJECTION, SOLUTION INFILTRATION; PERINEURAL
Status: DISPENSED
Start: 2022-06-23

## (undated) RX ORDER — OXYCODONE HYDROCHLORIDE 5 MG/1
TABLET ORAL
Status: DISPENSED
Start: 2021-12-01

## (undated) RX ORDER — CLINDAMYCIN PHOSPHATE 900 MG/50ML
INJECTION, SOLUTION INTRAVENOUS
Status: DISPENSED
Start: 2018-05-30

## (undated) RX ORDER — LIDOCAINE HYDROCHLORIDE 20 MG/ML
INJECTION, SOLUTION EPIDURAL; INFILTRATION; INTRACAUDAL; PERINEURAL
Status: DISPENSED
Start: 2021-12-01

## (undated) RX ORDER — NEOSTIGMINE METHYLSULFATE 1 MG/ML
VIAL (ML) INJECTION
Status: DISPENSED
Start: 2018-12-05

## (undated) RX ORDER — HYDROMORPHONE HCL IN WATER/PF 6 MG/30 ML
PATIENT CONTROLLED ANALGESIA SYRINGE INTRAVENOUS
Status: DISPENSED
Start: 2021-12-01

## (undated) RX ORDER — PHENAZOPYRIDINE HYDROCHLORIDE 200 MG/1
TABLET, FILM COATED ORAL
Status: DISPENSED
Start: 2021-12-01

## (undated) RX ORDER — PROPOFOL 10 MG/ML
INJECTION, EMULSION INTRAVENOUS
Status: DISPENSED
Start: 2021-12-01

## (undated) RX ORDER — FENTANYL CITRATE 0.05 MG/ML
INJECTION, SOLUTION INTRAMUSCULAR; INTRAVENOUS
Status: DISPENSED
Start: 2021-12-01

## (undated) RX ORDER — CEFAZOLIN SODIUM 2 G/100ML
INJECTION, SOLUTION INTRAVENOUS
Status: DISPENSED
Start: 2018-05-30

## (undated) RX ORDER — LIDOCAINE HYDROCHLORIDE 10 MG/ML
INJECTION, SOLUTION EPIDURAL; INFILTRATION; INTRACAUDAL; PERINEURAL
Status: DISPENSED
Start: 2018-12-05

## (undated) RX ORDER — DEXAMETHASONE SODIUM PHOSPHATE 4 MG/ML
INJECTION, SOLUTION INTRA-ARTICULAR; INTRALESIONAL; INTRAMUSCULAR; INTRAVENOUS; SOFT TISSUE
Status: DISPENSED
Start: 2018-12-05

## (undated) RX ORDER — BUPIVACAINE HYDROCHLORIDE AND EPINEPHRINE 5; 5 MG/ML; UG/ML
INJECTION, SOLUTION EPIDURAL; INTRACAUDAL; PERINEURAL
Status: DISPENSED
Start: 2018-12-05

## (undated) RX ORDER — FENTANYL CITRATE 50 UG/ML
INJECTION, SOLUTION INTRAMUSCULAR; INTRAVENOUS
Status: DISPENSED
Start: 2018-05-30

## (undated) RX ORDER — CLINDAMYCIN PHOSPHATE 900 MG/50ML
INJECTION, SOLUTION INTRAVENOUS
Status: DISPENSED
Start: 2018-12-05

## (undated) RX ORDER — LIDOCAINE HYDROCHLORIDE 20 MG/ML
INJECTION, SOLUTION EPIDURAL; INFILTRATION; INTRACAUDAL; PERINEURAL
Status: DISPENSED
Start: 2018-05-30

## (undated) RX ORDER — PROPOFOL 10 MG/ML
INJECTION, EMULSION INTRAVENOUS
Status: DISPENSED
Start: 2018-05-30

## (undated) RX ORDER — ONDANSETRON 2 MG/ML
INJECTION INTRAMUSCULAR; INTRAVENOUS
Status: DISPENSED
Start: 2021-12-01

## (undated) RX ORDER — HYDROMORPHONE HYDROCHLORIDE 1 MG/ML
INJECTION, SOLUTION INTRAMUSCULAR; INTRAVENOUS; SUBCUTANEOUS
Status: DISPENSED
Start: 2021-12-01